# Patient Record
Sex: FEMALE | Race: BLACK OR AFRICAN AMERICAN | Employment: FULL TIME | URBAN - METROPOLITAN AREA
[De-identification: names, ages, dates, MRNs, and addresses within clinical notes are randomized per-mention and may not be internally consistent; named-entity substitution may affect disease eponyms.]

---

## 2017-01-10 ENCOUNTER — OFFICE VISIT (OUTPATIENT)
Dept: FAMILY MEDICINE CLINIC | Age: 52
End: 2017-01-10

## 2017-01-10 VITALS
OXYGEN SATURATION: 98 % | HEART RATE: 88 BPM | BODY MASS INDEX: 36.19 KG/M2 | SYSTOLIC BLOOD PRESSURE: 144 MMHG | DIASTOLIC BLOOD PRESSURE: 75 MMHG | TEMPERATURE: 97.8 F | WEIGHT: 212 LBS | HEIGHT: 64 IN | RESPIRATION RATE: 18 BRPM

## 2017-01-10 DIAGNOSIS — B37.31 VAGINAL CANDIDIASIS: Primary | ICD-10-CM

## 2017-01-10 NOTE — PROGRESS NOTES
Bhakti Win is a 46 y.o.  female and presents with recurrent vaginal white discharge with pruritus. She already has the Diflucan with refills and needed to refill. She is sure of her symptoms and knows the difference between   BV and yeast. She denies possibility of STD since she is not sexually active. Chief Complaint   Patient presents with    Vaginal Itching     Subjective: Additional Concerns: none    Patient Active Problem List    Diagnosis Date Noted    Weight gain 09/09/2016    HTN (hypertension) 06/01/2016     Current Outpatient Prescriptions   Medication Sig Dispense Refill    testosterone (ANDROGEL) 1 % (50 mg/5 gram) glpk 1 Packet by TransDERmal route daily. Max Daily Amount: 50 mg. 2% topical. Apply 0.5mg to wrist daily 30 Packet 0    atenolol (TENORMIN) 100 mg tablet Take 1 Tab by mouth daily. 90 Tab 1    ibuprofen (MOTRIN) 800 mg tablet Take 1 Tab by mouth every eight (8) hours as needed for Pain. 90 Tab 2    HYDROcodone-acetaminophen (NORCO) 5-325 mg per tablet Take 1-2 Tabs by mouth every six (6) hours as needed for Pain. Indications: PAIN 30 Tab 0    rosuvastatin (CRESTOR) 20 mg tablet Take 1 Tab by mouth nightly. Indications: HYPERLIPIDEMIA 30 Tab 2    estradiol (ESTRACE) 0.5 mg tablet Take 1 Tab by mouth daily.  90 Tab 1     No Known Allergies  Past Medical History   Diagnosis Date    Gynecological disorder      fibroids and low test.     Hypertension     Neck pain      Past Surgical History   Procedure Laterality Date    Hx gyn      Hx hysterectomy       fibroids     Hx hysterectomy       removal of ovaries     Family History   Problem Relation Age of Onset    Hypertension Mother     High Cholesterol Mother     No Known Problems Father      Social History   Substance Use Topics    Smoking status: Never Smoker    Smokeless tobacco: Never Used    Alcohol use Yes      Comment: socailly      ROS     General: negative for - chills, fatigue, fever, weight change  GI: negative for - abdominal pain, change in bowel habits, constipation, diarrhea or nausea/vomiting  : negative for - dysuria, hematuria, incontinence, pelvic pain or vulvar/vaginal symptoms except vaginal discharge white and pruritic     Objective:  Vitals:    01/10/17 1718   BP: 144/75   Pulse: 88   Resp: 18   Temp: 97.8 °F (36.6 °C)   TempSrc: Oral   SpO2: 98%   Weight: 212 lb (96.2 kg)   Height: 5' 4.02\" (1.626 m)   PainSc:   0 - No pain     PE    Alert, well appearing, and in no distress, oriented to person, place, and time and overweight  Mental status - alert, oriented to person, place, and time, normal mood, behavior, speech, dress, motor activity, and thought processes  Chest - clear to auscultation, no wheezes, rales or rhonchi, symmetric air entry  Heart - normal rate, regular rhythm, normal S1, S2, no murmurs, rubs, clicks or gallops  Abdomen - soft, nontender, nondistended, no masses or organomegaly    LABS   No visits with results within 6 Month(s) from this visit. Latest known visit with results is:    Orders Only on 06/01/2016   Component Date Value Ref Range Status    Cholesterol, total 06/25/2016 227* 100 - 199 mg/dL Final    Triglyceride 06/25/2016 115  0 - 149 mg/dL Final    HDL Cholesterol 06/25/2016 53  >39 mg/dL Final    Comment: According to ATP-III Guidelines, HDL-C >59 mg/dL is considered a  negative risk factor for CHD.       VLDL, calculated 06/25/2016 23  5 - 40 mg/dL Final    LDL, calculated 06/25/2016 151* 0 - 99 mg/dL Final    WBC 06/25/2016 4.2  3.4 - 10.8 x10E3/uL Final    RBC 06/25/2016 4.30  3.77 - 5.28 x10E6/uL Final    HGB 06/25/2016 12.3  11.1 - 15.9 g/dL Final    HCT 06/25/2016 38.3  34.0 - 46.6 % Final    MCV 06/25/2016 89  79 - 97 fL Final    MCH 06/25/2016 28.6  26.6 - 33.0 pg Final    MCHC 06/25/2016 32.1  31.5 - 35.7 g/dL Final    RDW 06/25/2016 13.4  12.3 - 15.4 % Final    PLATELET 60/18/5467 501  150 - 379 x10E3/uL Final    NEUTROPHILS 06/25/2016 49  % Final    Lymphocytes 06/25/2016 42  % Final    MONOCYTES 06/25/2016 6  % Final    EOSINOPHILS 06/25/2016 3  % Final    BASOPHILS 06/25/2016 0  % Final    ABS. NEUTROPHILS 06/25/2016 2.1  1.4 - 7.0 x10E3/uL Final    Abs Lymphocytes 06/25/2016 1.8  0.7 - 3.1 x10E3/uL Final    ABS. MONOCYTES 06/25/2016 0.3  0.1 - 0.9 x10E3/uL Final    ABS. EOSINOPHILS 06/25/2016 0.1  0.0 - 0.4 x10E3/uL Final    ABS. BASOPHILS 06/25/2016 0.0  0.0 - 0.2 x10E3/uL Final    IMMATURE GRANULOCYTES 06/25/2016 0  % Final    ABS. IMM. GRANS. 06/25/2016 0.0  0.0 - 0.1 x10E3/uL Final    Glucose 06/25/2016 109* 65 - 99 mg/dL Final    BUN 06/25/2016 10  6 - 24 mg/dL Final    Creatinine 06/25/2016 0.98  0.57 - 1.00 mg/dL Final    GFR est non-AA 06/25/2016 66  >59 mL/min/1.73 Final    GFR est AA 06/25/2016 76  >59 mL/min/1.73 Final    BUN/Creatinine ratio 06/25/2016 10  9 - 23 Final    Sodium 06/25/2016 141  134 - 144 mmol/L Final    Potassium 06/25/2016 4.1  3.5 - 5.2 mmol/L Final    Chloride 06/25/2016 100  97 - 108 mmol/L Final    CO2 06/25/2016 23  18 - 29 mmol/L Final    Calcium 06/25/2016 9.6  8.7 - 10.2 mg/dL Final    Protein, total 06/25/2016 6.9  6.0 - 8.5 g/dL Final    Albumin 06/25/2016 4.3  3.5 - 5.5 g/dL Final    GLOBULIN, TOTAL 06/25/2016 2.6  1.5 - 4.5 g/dL Final    A-G Ratio 06/25/2016 1.7  1.1 - 2.5 Final    Bilirubin, total 06/25/2016 0.4  0.0 - 1.2 mg/dL Final    Alk. phosphatase 06/25/2016 48  39 - 117 IU/L Final    AST 06/25/2016 18  0 - 40 IU/L Final    ALT 06/25/2016 16  0 - 32 IU/L Final    TSH 06/25/2016 1.040  0.450 - 4.500 uIU/mL Final    Hemoglobin A1c 06/25/2016 5.5  4.8 - 5.6 % Final    Comment:          Pre-diabetes: 5.7 - 6.4           Diabetes: >6.4           Glycemic control for adults with diabetes: <7.0      Estimated average glucose 06/25/2016 111  mg/dL Final    INTERPRETATION 06/25/2016 Note   Final    Supplement report is available.        TESTS  Results for orders placed or performed in visit on 06/01/16   LIPID PANEL   Result Value Ref Range    Cholesterol, total 227 (H) 100 - 199 mg/dL    Triglyceride 115 0 - 149 mg/dL    HDL Cholesterol 53 >39 mg/dL    VLDL, calculated 23 5 - 40 mg/dL    LDL, calculated 151 (H) 0 - 99 mg/dL   CBC WITH AUTOMATED DIFF   Result Value Ref Range    WBC 4.2 3.4 - 10.8 x10E3/uL    RBC 4.30 3.77 - 5.28 x10E6/uL    HGB 12.3 11.1 - 15.9 g/dL    HCT 38.3 34.0 - 46.6 %    MCV 89 79 - 97 fL    MCH 28.6 26.6 - 33.0 pg    MCHC 32.1 31.5 - 35.7 g/dL    RDW 13.4 12.3 - 15.4 %    PLATELET 607 854 - 012 x10E3/uL    NEUTROPHILS 49 %    Lymphocytes 42 %    MONOCYTES 6 %    EOSINOPHILS 3 %    BASOPHILS 0 %    ABS. NEUTROPHILS 2.1 1.4 - 7.0 x10E3/uL    Abs Lymphocytes 1.8 0.7 - 3.1 x10E3/uL    ABS. MONOCYTES 0.3 0.1 - 0.9 x10E3/uL    ABS. EOSINOPHILS 0.1 0.0 - 0.4 x10E3/uL    ABS. BASOPHILS 0.0 0.0 - 0.2 x10E3/uL    IMMATURE GRANULOCYTES 0 %    ABS. IMM. GRANS. 0.0 0.0 - 0.1 O56Q6/CT   METABOLIC PANEL, COMPREHENSIVE   Result Value Ref Range    Glucose 109 (H) 65 - 99 mg/dL    BUN 10 6 - 24 mg/dL    Creatinine 0.98 0.57 - 1.00 mg/dL    GFR est non-AA 66 >59 mL/min/1.73    GFR est AA 76 >59 mL/min/1.73    BUN/Creatinine ratio 10 9 - 23    Sodium 141 134 - 144 mmol/L    Potassium 4.1 3.5 - 5.2 mmol/L    Chloride 100 97 - 108 mmol/L    CO2 23 18 - 29 mmol/L    Calcium 9.6 8.7 - 10.2 mg/dL    Protein, total 6.9 6.0 - 8.5 g/dL    Albumin 4.3 3.5 - 5.5 g/dL    GLOBULIN, TOTAL 2.6 1.5 - 4.5 g/dL    A-G Ratio 1.7 1.1 - 2.5    Bilirubin, total 0.4 0.0 - 1.2 mg/dL    Alk.  phosphatase 48 39 - 117 IU/L    AST 18 0 - 40 IU/L    ALT 16 0 - 32 IU/L   TSH 3RD GENERATION   Result Value Ref Range    TSH 1.040 0.450 - 4.500 uIU/mL   HEMOGLOBIN A1C WITH EAG   Result Value Ref Range    Hemoglobin A1c 5.5 4.8 - 5.6 %    Estimated average glucose 111 mg/dL   CVD REPORT   Result Value Ref Range    INTERPRETATION Note      Assessment/Plan:     Vaginal candidiasis by symptoms - We decided for her to retry her remaining Diflucan from last treatment and see if that takes care of this problem and to do certain   Precautions in preventing vaginal yeast infections. Lab review: orders written for new lab studies as appropriate; see orders. I have discussed the diagnosis with the patient and the intended plan as seen in the above orders. The patient has received an after-visit summary and questions were answered concerning future plans. I have discussed medication side effects and warnings with the patient as well. I have reviewed the plan of care with the patient, accepted their input and they are in agreement with the treatment goals. F/U as needed.      Ariel Gotti MD
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
## 2017-01-11 PROBLEM — N89.8 VAGINAL DISCHARGE: Status: ACTIVE | Noted: 2017-01-11

## 2017-04-19 DIAGNOSIS — I10 ESSENTIAL HYPERTENSION: ICD-10-CM

## 2017-04-19 RX ORDER — ESTRADIOL 0.5 MG/1
0.5 TABLET ORAL DAILY
Qty: 90 TAB | Refills: 2 | Status: SHIPPED | OUTPATIENT
Start: 2017-04-19 | End: 2017-08-04 | Stop reason: SDUPTHER

## 2017-04-19 RX ORDER — ATENOLOL 100 MG/1
100 TABLET ORAL DAILY
Qty: 90 TAB | Refills: 2 | Status: SHIPPED | OUTPATIENT
Start: 2017-04-19 | End: 2017-08-04 | Stop reason: SDUPTHER

## 2017-04-19 NOTE — TELEPHONE ENCOUNTER
Pt calling to request medication refill of:  Requested Prescriptions     Pending Prescriptions Disp Refills    atenolol (TENORMIN) 100 mg tablet 90 Tab 2     Sig: Take 1 Tab by mouth daily.  estradiol (ESTRACE) 0.5 mg tablet 90 Tab 2     Sig: Take 1 Tab by mouth daily. be sent to [address] [place] [address] Rd  Pt has about 5-52 tabs remaining. Pts last appt was 1/10/17   Advised pt of 72 hour time frame for refill requests. Please advise.

## 2017-05-16 ENCOUNTER — HOSPITAL ENCOUNTER (OUTPATIENT)
Dept: MAMMOGRAPHY | Age: 52
Discharge: HOME | End: 2017-05-16
Attending: FAMILY MEDICINE
Payer: COMMERCIAL

## 2017-05-16 DIAGNOSIS — Z12.31 VISIT FOR SCREENING MAMMOGRAM: ICD-10-CM

## 2017-05-16 PROCEDURE — 77067 SCR MAMMO BI INCL CAD: CPT

## 2017-05-17 ENCOUNTER — TELEPHONE (OUTPATIENT)
Dept: FAMILY MEDICINE CLINIC | Age: 52
End: 2017-05-17

## 2017-08-04 ENCOUNTER — OFFICE VISIT (OUTPATIENT)
Dept: FAMILY MEDICINE CLINIC | Age: 52
End: 2017-08-04

## 2017-08-04 VITALS
HEIGHT: 64 IN | HEART RATE: 70 BPM | OXYGEN SATURATION: 98 % | TEMPERATURE: 98.5 F | BODY MASS INDEX: 35.68 KG/M2 | SYSTOLIC BLOOD PRESSURE: 135 MMHG | DIASTOLIC BLOOD PRESSURE: 70 MMHG | RESPIRATION RATE: 16 BRPM | WEIGHT: 209 LBS

## 2017-08-04 DIAGNOSIS — I10 ESSENTIAL HYPERTENSION: ICD-10-CM

## 2017-08-04 RX ORDER — ATENOLOL 100 MG/1
100 TABLET ORAL DAILY
Qty: 90 TAB | Refills: 2 | Status: SHIPPED | OUTPATIENT
Start: 2017-08-04 | End: 2017-11-08 | Stop reason: SDUPTHER

## 2017-08-04 RX ORDER — ESTRADIOL 0.5 MG/1
0.5 TABLET ORAL DAILY
Qty: 90 TAB | Refills: 2 | Status: SHIPPED | OUTPATIENT
Start: 2017-08-04 | End: 2018-07-10 | Stop reason: SDUPTHER

## 2017-08-04 NOTE — PROGRESS NOTES
Purvi Carty is a 45 y.o.  female and presents with preop for cervical fusion and decompression C6-7. Refill for HTN meds as well. Chief Complaint   Patient presents with    Pre-op Exam     Subjective: Additional Concerns: none     Patient Active Problem List    Diagnosis Date Noted    Vaginal discharge 01/11/2017    Weight gain 09/09/2016    HTN (hypertension) 06/01/2016     Current Outpatient Prescriptions   Medication Sig Dispense Refill    atenolol (TENORMIN) 100 mg tablet Take 1 Tab by mouth daily. 90 Tab 2    estradiol (ESTRACE) 0.5 mg tablet Take 1 Tab by mouth daily. 90 Tab 2    ibuprofen (MOTRIN) 800 mg tablet Take 1 Tab by mouth every eight (8) hours as needed for Pain. 90 Tab 2    testosterone (ANDROGEL) 1 % (50 mg/5 gram) glpk 1 Packet by TransDERmal route daily. Max Daily Amount: 50 mg. 2% topical. Apply 0.5mg to wrist daily 30 Packet 0    HYDROcodone-acetaminophen (NORCO) 5-325 mg per tablet Take 1-2 Tabs by mouth every six (6) hours as needed for Pain. Indications: PAIN 30 Tab 0    rosuvastatin (CRESTOR) 20 mg tablet Take 1 Tab by mouth nightly.  Indications: HYPERLIPIDEMIA 30 Tab 2     Allergies   Allergen Reactions    Crestor [Rosuvastatin] Nausea Only     Past Medical History:   Diagnosis Date    Gynecological disorder     fibroids and low test.     Hypertension     Neck pain      Past Surgical History:   Procedure Laterality Date    HX GYN      HX HYSTERECTOMY      fibroids     HX HYSTERECTOMY      removal of ovaries     Family History   Problem Relation Age of Onset    Hypertension Mother     High Cholesterol Mother     No Known Problems Father      Social History   Substance Use Topics    Smoking status: Never Smoker    Smokeless tobacco: Never Used    Alcohol use Yes      Comment: socailly      ROS     General: negative for - chills, fatigue, fever, weight change  Psych: negative for - anxiety, depression, irritability or mood swings  Resp: negative for - cough, shortness of breath or wheezing  CV: negative for - chest pain, edema or palpitations  GI: negative for - abdominal pain, change in bowel habits, constipation, diarrhea or nausea/vomiting  : negative for - dysuria, hematuria, incontinence, pelvic pain or vulvar/vaginal symptoms  MSK: positive for - neck joint pain, joint swelling or muscle pain  Neuro: negative for - confusion, headaches, seizures or weakness    Objective:  Vitals:    08/04/17 1131   BP: 135/70   Pulse: 70   Resp: 16   Temp: 98.5 °F (36.9 °C)   TempSrc: Oral   SpO2: 98%   Weight: 209 lb (94.8 kg)   Height: 5' 4\" (1.626 m)   PainSc:   0 - No pain     PE    Alert, well appearing, and in no distress, oriented to person, place, and time and overweight  Mental status - alert, oriented to person, place, and time, normal mood, behavior, speech, dress, motor activity, and thought processes  Eyes - pupils equal and reactive, extraocular eye movements intact  Ears - bilateral TM's and external ear canals normal  Mouth - mucous membranes moist, pharynx normal without lesions  Neck - supple, no significant adenopathy  Chest - clear to auscultation, no wheezes, rales or rhonchi, symmetric air entry  Heart - normal rate, regular rhythm, normal S1, S2, no murmurs, rubs, clicks or gallops  Abdomen - soft, nontender, nondistended, no masses or organomegaly  Neurological - alert, oriented, normal speech, no focal findings or movement disorder noted  Musculoskeletal - no joint tenderness, deformity or swelling  Extremities - peripheral pulses normal, no pedal edema, no clubbing or cyanosis    LABS   No visits with results within 6 Month(s) from this visit.   Latest known visit with results is:    Orders Only on 06/01/2016   Component Date Value Ref Range Status    Cholesterol, total 06/25/2016 227* 100 - 199 mg/dL Final    Triglyceride 06/25/2016 115  0 - 149 mg/dL Final    HDL Cholesterol 06/25/2016 53  >39 mg/dL Final    Comment: According to ATP-III Guidelines, HDL-C >59 mg/dL is considered a  negative risk factor for CHD.  VLDL, calculated 06/25/2016 23  5 - 40 mg/dL Final    LDL, calculated 06/25/2016 151* 0 - 99 mg/dL Final    WBC 06/25/2016 4.2  3.4 - 10.8 x10E3/uL Final    RBC 06/25/2016 4.30  3.77 - 5.28 x10E6/uL Final    HGB 06/25/2016 12.3  11.1 - 15.9 g/dL Final    HCT 06/25/2016 38.3  34.0 - 46.6 % Final    MCV 06/25/2016 89  79 - 97 fL Final    MCH 06/25/2016 28.6  26.6 - 33.0 pg Final    MCHC 06/25/2016 32.1  31.5 - 35.7 g/dL Final    RDW 06/25/2016 13.4  12.3 - 15.4 % Final    PLATELET 53/00/3908 975  150 - 379 x10E3/uL Final    NEUTROPHILS 06/25/2016 49  % Final    Lymphocytes 06/25/2016 42  % Final    MONOCYTES 06/25/2016 6  % Final    EOSINOPHILS 06/25/2016 3  % Final    BASOPHILS 06/25/2016 0  % Final    ABS. NEUTROPHILS 06/25/2016 2.1  1.4 - 7.0 x10E3/uL Final    Abs Lymphocytes 06/25/2016 1.8  0.7 - 3.1 x10E3/uL Final    ABS. MONOCYTES 06/25/2016 0.3  0.1 - 0.9 x10E3/uL Final    ABS. EOSINOPHILS 06/25/2016 0.1  0.0 - 0.4 x10E3/uL Final    ABS. BASOPHILS 06/25/2016 0.0  0.0 - 0.2 x10E3/uL Final    IMMATURE GRANULOCYTES 06/25/2016 0  % Final    ABS. IMM.  GRANS. 06/25/2016 0.0  0.0 - 0.1 x10E3/uL Final    Glucose 06/25/2016 109* 65 - 99 mg/dL Final    BUN 06/25/2016 10  6 - 24 mg/dL Final    Creatinine 06/25/2016 0.98  0.57 - 1.00 mg/dL Final    GFR est non-AA 06/25/2016 66  >59 mL/min/1.73 Final    GFR est AA 06/25/2016 76  >59 mL/min/1.73 Final    BUN/Creatinine ratio 06/25/2016 10  9 - 23 Final    Sodium 06/25/2016 141  134 - 144 mmol/L Final    Potassium 06/25/2016 4.1  3.5 - 5.2 mmol/L Final    Chloride 06/25/2016 100  97 - 108 mmol/L Final    CO2 06/25/2016 23  18 - 29 mmol/L Final    Calcium 06/25/2016 9.6  8.7 - 10.2 mg/dL Final    Protein, total 06/25/2016 6.9  6.0 - 8.5 g/dL Final    Albumin 06/25/2016 4.3  3.5 - 5.5 g/dL Final    GLOBULIN, TOTAL 06/25/2016 2.6  1.5 - 4.5 g/dL Final  A-G Ratio 06/25/2016 1.7  1.1 - 2.5 Final    Bilirubin, total 06/25/2016 0.4  0.0 - 1.2 mg/dL Final    Alk. phosphatase 06/25/2016 48  39 - 117 IU/L Final    AST (SGOT) 06/25/2016 18  0 - 40 IU/L Final    ALT (SGPT) 06/25/2016 16  0 - 32 IU/L Final    TSH 06/25/2016 1.040  0.450 - 4.500 uIU/mL Final    Hemoglobin A1c 06/25/2016 5.5  4.8 - 5.6 % Final    Comment:          Pre-diabetes: 5.7 - 6.4           Diabetes: >6.4           Glycemic control for adults with diabetes: <7.0      Estimated average glucose 06/25/2016 111  mg/dL Final    INTERPRETATION 06/25/2016 Note   Final    Supplement report is available. TESTS  Results for orders placed or performed in visit on 06/01/16   LIPID PANEL   Result Value Ref Range    Cholesterol, total 227 (H) 100 - 199 mg/dL    Triglyceride 115 0 - 149 mg/dL    HDL Cholesterol 53 >39 mg/dL    VLDL, calculated 23 5 - 40 mg/dL    LDL, calculated 151 (H) 0 - 99 mg/dL   CBC WITH AUTOMATED DIFF   Result Value Ref Range    WBC 4.2 3.4 - 10.8 x10E3/uL    RBC 4.30 3.77 - 5.28 x10E6/uL    HGB 12.3 11.1 - 15.9 g/dL    HCT 38.3 34.0 - 46.6 %    MCV 89 79 - 97 fL    MCH 28.6 26.6 - 33.0 pg    MCHC 32.1 31.5 - 35.7 g/dL    RDW 13.4 12.3 - 15.4 %    PLATELET 584 814 - 076 x10E3/uL    NEUTROPHILS 49 %    Lymphocytes 42 %    MONOCYTES 6 %    EOSINOPHILS 3 %    BASOPHILS 0 %    ABS. NEUTROPHILS 2.1 1.4 - 7.0 x10E3/uL    Abs Lymphocytes 1.8 0.7 - 3.1 x10E3/uL    ABS. MONOCYTES 0.3 0.1 - 0.9 x10E3/uL    ABS. EOSINOPHILS 0.1 0.0 - 0.4 x10E3/uL    ABS. BASOPHILS 0.0 0.0 - 0.2 x10E3/uL    IMMATURE GRANULOCYTES 0 %    ABS. IMM.  GRANS. 0.0 0.0 - 0.1 [id number]   METABOLIC PANEL, COMPREHENSIVE   Result Value Ref Range    Glucose 109 (H) 65 - 99 mg/dL    BUN 10 6 - 24 mg/dL    Creatinine 0.98 0.57 - 1.00 mg/dL    GFR est non-AA 66 >59 mL/min/1.73    GFR est AA 76 >59 mL/min/1.73    BUN/Creatinine ratio 10 9 - 23    Sodium 141 134 - 144 mmol/L    Potassium 4.1 3.5 - 5.2 mmol/L    Chloride 100 97 - 108 mmol/L    CO2 23 18 - 29 mmol/L    Calcium 9.6 8.7 - 10.2 mg/dL    Protein, total 6.9 6.0 - 8.5 g/dL    Albumin 4.3 3.5 - 5.5 g/dL    GLOBULIN, TOTAL 2.6 1.5 - 4.5 g/dL    A-G Ratio 1.7 1.1 - 2.5    Bilirubin, total 0.4 0.0 - 1.2 mg/dL    Alk. phosphatase 48 39 - 117 IU/L    AST (SGOT) 18 0 - 40 IU/L    ALT (SGPT) 16 0 - 32 IU/L   TSH 3RD GENERATION   Result Value Ref Range    TSH 1.040 0.450 - 4.500 uIU/mL   HEMOGLOBIN A1C WITH EAG   Result Value Ref Range    Hemoglobin A1c 5.5 4.8 - 5.6 %    Estimated average glucose 111 mg/dL   CVD REPORT   Result Value Ref Range    INTERPRETATION Note      Assessment/Plan:      1. Essential hypertension  - atenolol (TENORMIN) 100 mg tablet; Take 1 Tab by mouth daily. Dispense: 90 Tab; Refill: 2    2. Pre-op for cervical fusion and decompression C6-7. Patient is cleared for this procedure. 3. HRT - refilled Estrace     Lab review: orders written for new lab studies as appropriate; see orders. I have discussed the diagnosis with the patient and the intended plan as seen in the above orders. The patient has received an after-visit summary and questions were answered concerning future plans. I have discussed medication side effects and warnings with the patient as well. I have reviewed the plan of care with the patient, accepted their input and they are in agreement with the treatment goals.      F/U as needed    Deisy Degroot MD

## 2017-08-04 NOTE — PROGRESS NOTES
Дмитрий Tanner is a 45 y.o. female presents to office for pre-op exam.       1. Have you been to the ER, urgent care clinic or hospitalized since your last visit? no  2. Have you seen any other providers outside of [facility] since your last visit? yes  3. Have you had a Flu shot this year?  yes      Health Maintenance items with a due date reviewed with patient:  Health Maintenance Due   Topic Date Due    Hepatitis C Screening  1965    PAP AKA CERVICAL CYTOLOGY  05/14/1984    FOBT Q 1 YEAR AGE 50-75  05/14/2013    INFLUENZA AGE 9 TO ADULT  08/01/2017

## 2017-08-04 NOTE — PATIENT INSTRUCTIONS
DASH Diet: Care Instructions  Your Care Instructions  The DASH diet is an eating plan that can help lower your blood pressure. DASH stands for Dietary Approaches to Stop Hypertension. Hypertension is high blood pressure. The DASH diet focuses on eating foods that are high in calcium, potassium, and magnesium. These nutrients can lower blood pressure. The foods that are highest in these nutrients are fruits, vegetables, low-fat dairy products, nuts, seeds, and legumes. But taking calcium, potassium, and magnesium supplements instead of eating foods that are high in those nutrients does not have the same effect. The DASH diet also includes whole grains, fish, and poultry. The DASH diet is one of several lifestyle changes your doctor may recommend to lower your high blood pressure. Your doctor may also want you to decrease the amount of sodium in your diet. Lowering sodium while following the DASH diet can lower blood pressure even further than just the DASH diet alone. Follow-up care is a key part of your treatment and safety. Be sure to make and go to all appointments, and call your doctor if you are having problems. It's also a good idea to know your test results and keep a list of the medicines you take. How can you care for yourself at home? Following the DASH diet  · Eat 4 to 5 servings of fruit each day. A serving is 1 medium-sized piece of fruit, ½ cup chopped or canned fruit, 1/4 cup dried fruit, or 4 ounces (½ cup) of fruit juice. Choose fruit more often than fruit juice. · Eat 4 to 5 servings of vegetables each day. A serving is 1 cup of lettuce or raw leafy vegetables, ½ cup of chopped or cooked vegetables, or 4 ounces (½ cup) of vegetable juice. Choose vegetables more often than vegetable juice. · Get 2 to 3 servings of low-fat and fat-free dairy each day. A serving is 8 ounces of milk, 1 cup of yogurt, or 1 ½ ounces of cheese. · Eat 6 to 8 servings of grains each day.  A serving is 1 slice of bread, 1 ounce of dry cereal, or ½ cup of cooked rice, pasta, or cooked cereal. Try to choose whole-grain products as much as possible. · Limit lean meat, poultry, and fish to 2 servings each day. A serving is 3 ounces, about the size of a deck of cards. · Eat 4 to 5 servings of nuts, seeds, and legumes (cooked dried beans, lentils, and split peas) each week. A serving is 1/3 cup of nuts, 2 tablespoons of seeds, or ½ cup of cooked beans or peas. · Limit fats and oils to 2 to 3 servings each day. A serving is 1 teaspoon of vegetable oil or 2 tablespoons of salad dressing. · Limit sweets and added sugars to 5 servings or less a week. A serving is 1 tablespoon jelly or jam, ½ cup sorbet, or 1 cup of lemonade. · Eat less than 2,300 milligrams (mg) of sodium a day. If you limit your sodium to 1,500 mg a day, you can lower your blood pressure even more. Tips for success  · Start small. Do not try to make dramatic changes to your diet all at once. You might feel that you are missing out on your favorite foods and then be more likely to not follow the plan. Make small changes, and stick with them. Once those changes become habit, add a few more changes. · Try some of the following:  ¨ Make it a goal to eat a fruit or vegetable at every meal and at snacks. This will make it easy to get the recommended amount of fruits and vegetables each day. ¨ Try yogurt topped with fruit and nuts for a snack or healthy dessert. ¨ Add lettuce, tomato, cucumber, and onion to sandwiches. ¨ Combine a ready-made pizza crust with low-fat mozzarella cheese and lots of vegetable toppings. Try using tomatoes, squash, spinach, broccoli, carrots, cauliflower, and onions. ¨ Have a variety of cut-up vegetables with a low-fat dip as an appetizer instead of chips and dip. ¨ Sprinkle sunflower seeds or chopped almonds over salads. Or try adding chopped walnuts or almonds to cooked vegetables. ¨ Try some vegetarian meals using beans and peas. Add garbanzo or kidney beans to salads. Make burritos and tacos with mashed quintana beans or black beans. Where can you learn more? Go to http://charity-antoni.info/. Enter K072 in the search box to learn more about \"DASH Diet: Care Instructions. \"  Current as of: April 3, 2017  Content Version: 11.3  © 1050-5015 [employer]. Care instructions adapted under license by [employer] (which disclaims liability or warranty for this information). If you have questions about a medical condition or this instruction, always ask your healthcare professional. [place] [address] any warranty or liability for your use of this information. Low Sodium Diet (2,000 Milligram): Care Instructions  Your Care Instructions  Too much sodium causes your body to hold on to extra water. This can raise your blood pressure and force your heart and kidneys to work harder. In very serious cases, this could cause you to be put in the hospital. It might even be life-threatening. By limiting sodium, you will feel better and lower your risk of serious problems. The most common source of sodium is salt. People get most of the salt in their diet from canned, prepared, and packaged foods. Fast food and restaurant meals also are very high in sodium. Your doctor will probably limit your sodium to less than 2,000 milligrams (mg) a day. This limit counts all the sodium in prepared and packaged foods and any salt you add to your food. Follow-up care is a key part of your treatment and safety. Be sure to make and go to all appointments, and call your doctor if you are having problems. It's also a good idea to know your test results and keep a list of the medicines you take. How can you care for yourself at home? Read food labels  · Read labels on cans and food packages. The labels tell you how much sodium is in each serving. Make sure that you look at the serving size.  If you eat more than the serving size, you have eaten more sodium. · Food labels also tell you the Percent Daily Value for sodium. Choose products with low Percent Daily Values for sodium. · Be aware that sodium can come in forms other than salt, including monosodium glutamate (MSG), sodium citrate, and sodium bicarbonate (baking soda). MSG is often added to Asian food. When you eat out, you can sometimes ask for food without MSG or added salt. Buy low-sodium foods  · Buy foods that are labeled \"unsalted\" (no salt added), \"sodium-free\" (less than 5 mg of sodium per serving), or \"low-sodium\" (less than 140 mg of sodium per serving). Foods labeled \"reduced-sodium\" and \"light sodium\" may still have too much sodium. Be sure to read the label to see how much sodium you are getting. · Buy fresh vegetables, or frozen vegetables without added sauces. Buy low-sodium versions of canned vegetables, soups, and other canned goods. Prepare low-sodium meals  · Cut back on the amount of salt you use in cooking. This will help you adjust to the taste. Do not add salt after cooking. One teaspoon of salt has about 2,300 mg of sodium. · Take the salt shaker off the table. · Flavor your food with garlic, lemon juice, onion, vinegar, herbs, and spices. Do not use soy sauce, lite soy sauce, steak sauce, onion salt, garlic salt, celery salt, mustard, or ketchup on your food. · Use low-sodium salad dressings, sauces, and ketchup. Or make your own salad dressings and sauces without adding salt. · Use less salt (or none) when recipes call for it. You can often use half the salt a recipe calls for without losing flavor. Other foods such as rice, pasta, and grains do not need added salt. · Rinse canned vegetables, and cook them in fresh water. This removes some--but not all--of the salt. · Avoid water that is naturally high in sodium or that has been treated with water softeners, which add sodium.  Call your local water company to find out the sodium content of your water supply. If you buy bottled water, read the label and choose a sodium-free brand. Avoid high-sodium foods  · Avoid eating:  ¨ Smoked, cured, salted, and canned meat, fish, and poultry. ¨ Ham, trinh, hot dogs, and luncheon meats. ¨ Regular, hard, and processed cheese and regular peanut butter. ¨ Crackers with salted tops, and other salted snack foods such as pretzels, chips, and salted popcorn. ¨ Frozen prepared meals, unless labeled low-sodium. ¨ Canned and dried soups, broths, and bouillon, unless labeled sodium-free or low-sodium. ¨ Canned vegetables, unless labeled sodium-free or low-sodium. ¨ [place] fries, pizza, tacos, and other fast foods. ¨ Pickles, olives, ketchup, and other condiments, especially soy sauce, unless labeled sodium-free or low-sodium. Where can you learn more? Go to http://charity-antoni.info/. Enter M553 in the search box to learn more about \"Low Sodium Diet (2,000 Milligram): Care Instructions. \"  Current as of: July 26, 2016  Content Version: 11.3  © 1560-4823 [employer]. Care instructions adapted under license by [employer] (which disclaims liability or warranty for this information). If you have questions about a medical condition or this instruction, always ask your healthcare professional. [place] [address] any warranty or liability for your use of this information. High Blood Pressure: Care Instructions  Your Care Instructions  If your blood pressure is usually above 140/90, you have high blood pressure, or hypertension. That means the top number is 140 or higher or the bottom number is 90 or higher, or both. Despite what a lot of people think, high blood pressure usually doesn't cause headaches or make you feel dizzy or lightheaded. It usually has no symptoms. But it does increase your risk for heart attack, stroke, and kidney or eye damage.  The higher your blood pressure, the more your risk increases. Your doctor will give you a goal for your blood pressure. Your goal will be based on your health and your age. An example of a goal is to keep your blood pressure below 140/90. Lifestyle changes, such as eating healthy and being active, are always important to help lower blood pressure. You might also take medicine to reach your blood pressure goal.  Follow-up care is a key part of your treatment and safety. Be sure to make and go to all appointments, and call your doctor if you are having problems. It's also a good idea to know your test results and keep a list of the medicines you take. How can you care for yourself at home? Medical treatment  · If you stop taking your medicine, your blood pressure will go back up. You may take one or more types of medicine to lower your blood pressure. Be safe with medicines. Take your medicine exactly as prescribed. Call your doctor if you think you are having a problem with your medicine. · Talk to your doctor before you start taking aspirin every day. Aspirin can help certain people lower their risk of a heart attack or stroke. But taking aspirin isn't right for everyone, because it can cause serious bleeding. · See your doctor regularly. You may need to see the doctor more often at first or until your blood pressure comes down. · If you are taking blood pressure medicine, talk to your doctor before you take decongestants or anti-inflammatory medicine, such as ibuprofen. Some of these medicines can raise blood pressure. · Learn how to check your blood pressure at home. Lifestyle changes  · Stay at a healthy weight. This is especially important if you put on weight around the waist. Losing even 10 pounds can help you lower your blood pressure. · If your doctor recommends it, get more exercise. Walking is a good choice. Bit by bit, increase the amount you walk every day. Try for at least 30 minutes on most days of the week.  You also may want to swim, bike, or do other activities. · Avoid or limit alcohol. Talk to your doctor about whether you can drink any alcohol. · Try to limit how much sodium you eat to less than 2,300 milligrams (mg) a day. Your doctor may ask you to try to eat less than 1,500 mg a day. · Eat plenty of fruits (such as bananas and oranges), vegetables, legumes, whole grains, and low-fat dairy products. · Lower the amount of saturated fat in your diet. Saturated fat is found in animal products such as milk, cheese, and meat. Limiting these foods may help you lose weight and also lower your risk for heart disease. · Do not smoke. Smoking increases your risk for heart attack and stroke. If you need help quitting, talk to your doctor about stop-smoking programs and medicines. These can increase your chances of quitting for good. When should you call for help? Call 911 anytime you think you may need emergency care. This may mean having symptoms that suggest that your blood pressure is causing a serious heart or blood vessel problem. Your blood pressure may be over 180/110. For example, call 911 if:  · You have symptoms of a heart attack. These may include:  ¨ Chest pain or pressure, or a strange feeling in the chest.  ¨ Sweating. ¨ Shortness of breath. ¨ Nausea or vomiting. ¨ Pain, pressure, or a strange feeling in the back, neck, jaw, or upper belly or in one or both shoulders or arms. ¨ Lightheadedness or sudden weakness. ¨ A fast or irregular heartbeat. · You have symptoms of a stroke. These may include:  ¨ Sudden numbness, tingling, weakness, or loss of movement in your face, arm, or leg, especially on only one side of your body. ¨ Sudden vision changes. ¨ Sudden trouble speaking. ¨ Sudden confusion or trouble understanding simple statements. ¨ Sudden problems with walking or balance. ¨ A sudden, severe headache that is different from past headaches. · You have severe back or belly pain.   Do not wait until your blood pressure comes down on its own. Get help right away. Call your doctor now or seek immediate care if:  · Your blood pressure is much higher than normal (such as 180/110 or higher), but you don't have symptoms. · You think high blood pressure is causing symptoms, such as:  ¨ Severe headache. ¨ Blurry vision. Watch closely for changes in your health, and be sure to contact your doctor if:  · Your blood pressure measures 140/90 or higher at least 2 times. That means the top number is 140 or higher or the bottom number is 90 or higher, or both. · You think you may be having side effects from your blood pressure medicine. · Your blood pressure is usually normal, but it goes above normal at least 2 times. Where can you learn more? Go to http://charity-antoni.info/. Enter X183 in the search box to learn more about \"High Blood Pressure: Care Instructions. \"  Current as of: August 8, 2016  Content Version: 11.3  © 0860-5480 [employer]. Care instructions adapted under license by [employer] (which disclaims liability or warranty for this information). If you have questions about a medical condition or this instruction, always ask your healthcare professional. [place] [address] any warranty or liability for your use of this information. Hormone Therapy (HT): Care Instructions  Your Care Instructions  Hormone therapy (HT) is medicine to treat symptoms of menopause, such as hot flashes, vaginal dryness, and sleep problems. It replaces the hormones that drop at menopause. Most women get relief from these symptoms within weeks of starting HT. HT contains two female hormones, estrogen and progestin. HT may come in the form of a pill, patch, gel, spray, or vaginal ring. A vaginal cream or a vaginal ring that has a much lower dose of estrogen may be used to relieve vaginal dryness only. HT has some risks.  Most doctors recommend that women only take HT for as short a time as possible. This is to reduce the chances of heart disease, breast cancer, blood clots, and stroke that may be connected to HT. Be sure to have regular checkups with your doctor when taking HT. Talk with your doctor about whether HT is right for you. If you decide that the benefits of HT outweigh the risks, ask your doctor to prescribe the lowest effective dose for as short a time as possible. Follow-up care is a key part of your treatment and safety. Be sure to make and go to all appointments, and call your doctor if you are having problems. It's also a good idea to know your test results and keep a list of the medicines you take. Why might you take HT?  · HT reduces symptoms of menopause. These include hot flashes, mood swings, and sleep problems. · The estrogen in HT helps to prevent thinning bones. And it may lower the chance of colon cancer. · HT helps keep the lining of the vagina moist and thick. This can reduce irritation. · HT helps protect against dental problems, such as tooth loss and gum disease. What are the risks of taking HT? · Some women who take HT may have vaginal bleeding, bloating, nausea, sore breasts, mood swings, and headaches. Talk to your doctor about changing the type of HT you take or lowering the dose. This may help to end these side effects. · Taking HT may slightly increase your risk for heart disease, breast cancer, ovarian cancer, blood clots, and stroke. · You should not take HT if you:  ¨ Could be pregnant. ¨ Have a personal history of breast cancer, endometrial cancer, pulmonary embolism, deep vein thrombosis, heart attack, or stroke. ¨ Have vaginal bleeding from an unknown cause. ¨ Have active liver disease. What can you do to reduce the symptoms of menopause? · Eat healthy foods and get regular exercise. This also will help to maintain strong bones and a healthy heart. · Do not smoke.  If you smoke, you can reduce hot flashes and long-term health risks by stopping. If you need help quitting, talk to your doctor about stop-smoking programs and medicines. These can increase your chances of quitting for good. · Practice daily breathing exercises (meditation) to reduce hot flashes and mood swings. · Limit the amount of alcohol you drink. This can reduce symptoms of menopause and long-term health risks. · Keep your home and office cool. · Use a vaginal lubricant, such as Astroglide, Wet Gel Lubricant, or K-Y Jelly. · Do pelvic floor (Kegel) exercises, which tighten and strengthen pelvic muscles. To do Kegel exercises:  ¨ Squeeze the same muscles you would use to stop your urine. Your belly and thighs should not move. ¨ Hold the squeeze for 3 seconds, then relax for 3 seconds. ¨ Start with 3 seconds. Then add 1 second each week until you are able to squeeze for 10 seconds. ¨ Repeat the exercise 10 to 15 times a session. Do three or more sessions a day. Where can you learn more? Go to http://charity-antoni.info/. Enter [id number] in the search box to learn more about \"Hormone Therapy (HT): Care Instructions. \"  Current as of: October 13, 2016  Content Version: 11.3  © 2478-2779 [employer]. Care instructions adapted under license by [employer] (which disclaims liability or warranty for this information). If you have questions about a medical condition or this instruction, always ask your healthcare professional. [place] [address] any warranty or liability for your use of this information. How to Prepare for Surgery  How do you prepare for surgery? Surgery can be stressful. This information will help you understand what you can expect. And it will help you safely prepare for surgery. Follow-up care is a key part of your treatment and safety. Be sure to make and go to all appointments, and call your doctor if you are having problems.  It's also a good idea to know your test results and keep a list of the medicines you take. What happens before surgery? Preparing for surgery  · Understand exactly what surgery is planned, along with the risks, benefits, and other options. · Tell your doctors ALL the medicines, vitamins, supplements, and herbal remedies you take. Some of these can increase the risk of bleeding or interact with anesthesia. · If you take blood thinners, such as warfarin (Coumadin), clopidogrel (Plavix), or aspirin, be sure to talk to your doctor. He or she will tell you if you should stop taking these medicines before your surgery. Make sure that you understand exactly what your doctor wants you to do. · Your doctor will tell you which medicines to take or stop before your surgery. You may need to stop taking certain medicines a week or more before surgery. So talk to your doctor as soon as you can. · If you have an advance directive, let your doctor know. It may include a living will and a durable power of  for health care. Bring a copy to the hospital. If don't have one, you may want to prepare one. It lets your doctor and loved ones know your health care wishes. Doctors advise that everyone prepare these papers before any type of surgery or procedure. What happens on the day of surgery? · Follow the instructions about when to stop eating and drinking. If you don't, your surgery may be canceled. If your doctor told you to take your medicines on the day of surgery, take them with only a sip of water. · Take a bath or shower before coming in for your surgery. Do not apply lotions, perfumes, deodorants, or nail polish. · Do not shave the surgical site yourself. · Take off all jewelry and piercings. And take out contact lenses, if you wear them. At the hospital or surgery center  · Bring a picture ID. · The area for surgery is often marked to make sure there are no errors. · You will be kept comfortable and safe by your anesthesia provider. The anesthesia may make you sleep.  Or it may just numb the area being worked on. Going home  · Be sure you have someone to drive you home. Anesthesia and pain medicine make it unsafe for you to drive. · You will be given more specific instructions about recovering from your surgery. They will cover things like diet, wound care, follow-up care, driving, and getting back to your normal routine. When should you call your doctor? · You have questions or concerns. · You don't understand how to prepare for your surgery. · You become ill before the surgery (such as fever, flu, or a cold). · You need to reschedule or have changed your mind about having the surgery. Where can you learn more? Go to http://charity-antoni.info/. Enter Q270 in the search box to learn more about \"How to Prepare for Surgery. \"  Current as of: August 14, 2016  Content Version: 11.3  © 0594-4012 [employer]. Care instructions adapted under license by [employer] (which disclaims liability or warranty for this information). If you have questions about a medical condition or this instruction, always ask your healthcare professional. [address] any warranty or liability for your use of this information.

## 2017-08-04 NOTE — MR AVS SNAPSHOT
Visit Information Date & Time Provider Department Dept. Phone Encounter #  
 8/4/2017 11:30 AM June Daniel MD [facility] [phone] [id number] Upcoming Health Maintenance Date Due Hepatitis C Screening 1965 PAP AKA CERVICAL CYTOLOGY 5/14/1984 FOBT Q 1 YEAR AGE 50-75 5/14/2013 INFLUENZA AGE 9 TO ADULT 8/1/2017 BREAST CANCER SCRN MAMMOGRAM 5/16/2019 DTaP/Tdap/Td series (2 - Td) 7/10/2026 Allergies as of 8/4/2017  Review Complete On: 8/4/2017 By: Joan Kinney LPN Severity Noted Reaction Type Reactions Crestor [Rosuvastatin]  08/04/2017    Nausea Only Current Immunizations  Reviewed on 11/14/2016 Name Date Hep A Vaccine 11/9/2016, 9/11/2016 Hep B Vaccine 11/9/2016, 9/11/2016 Influenza Vaccine 9/11/2016 Pneumococcal Polysaccharide (PPSV-23) 9/11/2016 Tdap 7/10/2016 Not reviewed this visit You Were Diagnosed With   
  
 Codes Comments Essential hypertension     ICD-10-CM: I10 
ICD-9-CM: 401.9 Vitals BP Pulse Temp Resp Height(growth percentile) Weight(growth percentile) 135/70 (BP 1 Location: Right arm, BP Patient Position: Sitting) 70 98.5 °F (36.9 °C) (Oral) 16 5' 4\" (1.626 m) 209 lb (94.8 kg) SpO2 BMI OB Status Smoking Status 98% 35.87 kg/m2 Hysterectomy Never Smoker BMI and BSA Data Body Mass Index Body Surface Area  
 35.87 kg/m 2 2.07 m 2 Preferred Pharmacy Pharmacy Name Phone [facility] [address] [place] [address] [phone] Your Updated Medication List  
  
   
This list is accurate as of: 8/4/17 12:04 PM.  Always use your most recent med list.  
  
  
  
  
 atenolol 100 mg tablet Commonly known as:  TENORMIN Take 1 Tab by mouth daily. estradiol 0.5 mg tablet Commonly known as:  ESTRACE Take 1 Tab by mouth daily. HYDROcodone-acetaminophen 5-325 mg per tablet Commonly known as:  Salvador Collar Take 1-2 Tabs by mouth every six (6) hours as needed for Pain. Indications: PAIN  
  
 ibuprofen 800 mg tablet Commonly known as:  MOTRIN Take 1 Tab by mouth every eight (8) hours as needed for Pain. rosuvastatin 20 mg tablet Commonly known as:  CRESTOR Take 1 Tab by mouth nightly. Indications: HYPERLIPIDEMIA  
  
 testosterone 1 % (50 mg/5 gram) Glpk Commonly known as:  ANDROGEL  
1 Packet by TransDERmal route daily. Max Daily Amount: 50 mg. 2% topical. Apply 0.5mg to wrist daily Introducing [facility]! Merissa Herrera introduces HomeZada patient portal. Now you can access parts of your medical record, email your doctor's office, and request medication refills online. 1. In your internet browser, go to [email or web address] 2. Click on the First Time User? Click Here link in the Sign In box. You will see the New Member Sign Up page. 3. Enter your HomeZada Access Code exactly as it appears below. You will not need to use this code after youve completed the sign-up process. If you do not sign up before the expiration date, you must request a new code. · HomeZada Access Code: [email or web address] Expires: 11/2/2017 12:04 PM 
 
4. Enter the last four digits of your Social Security Number (xxxx) and Date of Birth (mm/dd/yyyy) as indicated and click Submit. You will be taken to the next sign-up page. 5. Create a Udacityt ID. This will be your HomeZada login ID and cannot be changed, so think of one that is secure and easy to remember. 6. Create a HomeZada password. You can change your password at any time. 7. Enter your Password Reset Question and Answer. This can be used at a later time if you forget your password. 8. Enter your e-mail address. You will receive e-mail notification when new information is available in [address]. 9. Click Sign Up. You can now view and download portions of your medical record. 10. Click the Download Summary menu link to download a portable copy of your medical information. If you have questions, please visit the Frequently Asked Questions section of the As Seen on TV website. Remember, As Seen on TV is NOT to be used for urgent needs. For medical emergencies, dial 911. Now available from your iPhone and Android! Please provide this summary of care documentation to your next provider. Your primary care clinician is listed as Frantz Noyola. If you have any questions after today's visit, please call [phone].

## 2017-10-27 ENCOUNTER — OFFICE VISIT (OUTPATIENT)
Dept: FAMILY MEDICINE CLINIC | Age: 52
End: 2017-10-27

## 2017-10-27 VITALS
TEMPERATURE: 97.3 F | DIASTOLIC BLOOD PRESSURE: 82 MMHG | SYSTOLIC BLOOD PRESSURE: 147 MMHG | HEIGHT: 64 IN | OXYGEN SATURATION: 97 % | HEART RATE: 72 BPM | RESPIRATION RATE: 17 BRPM | BODY MASS INDEX: 36.81 KG/M2 | WEIGHT: 215.6 LBS

## 2017-10-27 DIAGNOSIS — R63.5 WEIGHT GAIN: ICD-10-CM

## 2017-10-27 DIAGNOSIS — G62.9 NEUROPATHY: Primary | ICD-10-CM

## 2017-10-27 NOTE — PROGRESS NOTES
Rika Weller is a 45 y.o.  female and presents with weight gain and peripheral neuropathy. Chief Complaint   Patient presents with    Tingling     Subjective: Additional Concerns: none    Patient Active Problem List    Diagnosis Date Noted    Vaginal discharge 01/11/2017    Weight gain 09/09/2016    HTN (hypertension) 06/01/2016     Current Outpatient Prescriptions   Medication Sig Dispense Refill    atenolol (TENORMIN) 100 mg tablet Take 1 Tab by mouth daily. 90 Tab 2    estradiol (ESTRACE) 0.5 mg tablet Take 1 Tab by mouth daily. 90 Tab 2    testosterone (ANDROGEL) 1 % (50 mg/5 gram) glpk 1 Packet by TransDERmal route daily. Max Daily Amount: 50 mg. 2% topical. Apply 0.5mg to wrist daily 30 Packet 0    rosuvastatin (CRESTOR) 20 mg tablet Take 1 Tab by mouth nightly. Indications: HYPERLIPIDEMIA 30 Tab 2    ibuprofen (MOTRIN) 800 mg tablet Take 1 Tab by mouth every eight (8) hours as needed for Pain. 90 Tab 2    HYDROcodone-acetaminophen (NORCO) 5-325 mg per tablet Take 1-2 Tabs by mouth every six (6) hours as needed for Pain.  Indications: PAIN 30 Tab 0     Allergies   Allergen Reactions    Crestor [Rosuvastatin] Nausea Only     Past Medical History:   Diagnosis Date    Gynecological disorder     fibroids and low test.     Hypertension     Neck pain      Past Surgical History:   Procedure Laterality Date    HX GYN      HX HYSTERECTOMY      fibroids     HX HYSTERECTOMY      removal of ovaries     Family History   Problem Relation Age of Onset    Hypertension Mother     High Cholesterol Mother     No Known Problems Father      Social History   Substance Use Topics    Smoking status: Never Smoker    Smokeless tobacco: Never Used    Alcohol use Yes      Comment: socailly      ROS     General: negative for - chills, fatigue, fever, positive weight gain change  Psych: negative for - anxiety, depression, irritability or mood swings  Endo: negative for - hot flashes, polydipsia/polyuria or temperature intolerance  Resp: negative for - cough, shortness of breath or wheezing  CV: negative for - chest pain, edema or palpitations  Neuro: negative for - confusion, headaches, seizures or weakness, positive for numbness and tingling     Objective:  Vitals:    10/27/17 1324   BP: 147/82   Pulse: 72   Resp: 17   Temp: 97.3 °F (36.3 °C)   TempSrc: Oral   SpO2: 97%   Weight: 215 lb 9.6 oz (97.8 kg)   Height: 5' 4\" (1.626 m)   PainSc:   2     PE    alert, well appearing, and in no distress, oriented to person, place, and time and overweight  General appearance - alert, well appearing, and in no distress, oriented to person, place, and time and overweight  Mental status - alert, oriented to person, place, and time, normal mood, behavior, speech, dress, motor activity, and thought processes  Chest - clear to auscultation, no wheezes, rales or rhonchi, symmetric air entry  Heart - normal rate, regular rhythm, normal S1, S2, no murmurs, rubs, clicks or gallops  Extremities - peripheral pulses normal, no pedal edema, no clubbing or cyanosis    LABS   No visits with results within 6 Month(s) from this visit. Latest known visit with results is:    Orders Only on 06/01/2016   Component Date Value Ref Range Status    Cholesterol, total 06/25/2016 227* 100 - 199 mg/dL Final    Triglyceride 06/25/2016 115  0 - 149 mg/dL Final    HDL Cholesterol 06/25/2016 53  >39 mg/dL Final    Comment: According to ATP-III Guidelines, HDL-C >59 mg/dL is considered a  negative risk factor for CHD.       VLDL, calculated 06/25/2016 23  5 - 40 mg/dL Final    LDL, calculated 06/25/2016 151* 0 - 99 mg/dL Final    WBC 06/25/2016 4.2  3.4 - 10.8 x10E3/uL Final    RBC 06/25/2016 4.30  3.77 - 5.28 x10E6/uL Final    HGB 06/25/2016 12.3  11.1 - 15.9 g/dL Final    HCT 06/25/2016 38.3  34.0 - 46.6 % Final    MCV 06/25/2016 89  79 - 97 fL Final    MCH 06/25/2016 28.6  26.6 - 33.0 pg Final    MCHC 06/25/2016 32.1  31.5 - 35.7 g/dL Final    RDW 06/25/2016 13.4  12.3 - 15.4 % Final    PLATELET 97/70/3933 873  150 - 379 x10E3/uL Final    NEUTROPHILS 06/25/2016 49  % Final    Lymphocytes 06/25/2016 42  % Final    MONOCYTES 06/25/2016 6  % Final    EOSINOPHILS 06/25/2016 3  % Final    BASOPHILS 06/25/2016 0  % Final    ABS. NEUTROPHILS 06/25/2016 2.1  1.4 - 7.0 x10E3/uL Final    Abs Lymphocytes 06/25/2016 1.8  0.7 - 3.1 x10E3/uL Final    ABS. MONOCYTES 06/25/2016 0.3  0.1 - 0.9 x10E3/uL Final    ABS. EOSINOPHILS 06/25/2016 0.1  0.0 - 0.4 x10E3/uL Final    ABS. BASOPHILS 06/25/2016 0.0  0.0 - 0.2 x10E3/uL Final    IMMATURE GRANULOCYTES 06/25/2016 0  % Final    ABS. IMM. GRANS. 06/25/2016 0.0  0.0 - 0.1 x10E3/uL Final    Glucose 06/25/2016 109* 65 - 99 mg/dL Final    BUN 06/25/2016 10  6 - 24 mg/dL Final    Creatinine 06/25/2016 0.98  0.57 - 1.00 mg/dL Final    GFR est non-AA 06/25/2016 66  >59 mL/min/1.73 Final    GFR est AA 06/25/2016 76  >59 mL/min/1.73 Final    BUN/Creatinine ratio 06/25/2016 10  9 - 23 Final    Sodium 06/25/2016 141  134 - 144 mmol/L Final    Potassium 06/25/2016 4.1  3.5 - 5.2 mmol/L Final    Chloride 06/25/2016 100  97 - 108 mmol/L Final    CO2 06/25/2016 23  18 - 29 mmol/L Final    Calcium 06/25/2016 9.6  8.7 - 10.2 mg/dL Final    Protein, total 06/25/2016 6.9  6.0 - 8.5 g/dL Final    Albumin 06/25/2016 4.3  3.5 - 5.5 g/dL Final    GLOBULIN, TOTAL 06/25/2016 2.6  1.5 - 4.5 g/dL Final    A-G Ratio 06/25/2016 1.7  1.1 - 2.5 Final    Bilirubin, total 06/25/2016 0.4  0.0 - 1.2 mg/dL Final    Alk.  phosphatase 06/25/2016 48  39 - 117 IU/L Final    AST (SGOT) 06/25/2016 18  0 - 40 IU/L Final    ALT (SGPT) 06/25/2016 16  0 - 32 IU/L Final    TSH 06/25/2016 1.040  0.450 - 4.500 uIU/mL Final    Hemoglobin A1c 06/25/2016 5.5  4.8 - 5.6 % Final    Comment:          Pre-diabetes: 5.7 - 6.4           Diabetes: >6.4           Glycemic control for adults with diabetes: <7.0      Estimated average glucose 06/25/2016 111  mg/dL Final    INTERPRETATION 06/25/2016 Note   Final    Supplement report is available. TESTS  Results for orders placed or performed in visit on 06/01/16   LIPID PANEL   Result Value Ref Range    Cholesterol, total 227 (H) 100 - 199 mg/dL    Triglyceride 115 0 - 149 mg/dL    HDL Cholesterol 53 >39 mg/dL    VLDL, calculated 23 5 - 40 mg/dL    LDL, calculated 151 (H) 0 - 99 mg/dL   CBC WITH AUTOMATED DIFF   Result Value Ref Range    WBC 4.2 3.4 - 10.8 x10E3/uL    RBC 4.30 3.77 - 5.28 x10E6/uL    HGB 12.3 11.1 - 15.9 g/dL    HCT 38.3 34.0 - 46.6 %    MCV 89 79 - 97 fL    MCH 28.6 26.6 - 33.0 pg    MCHC 32.1 31.5 - 35.7 g/dL    RDW 13.4 12.3 - 15.4 %    PLATELET 946 805 - 921 x10E3/uL    NEUTROPHILS 49 %    Lymphocytes 42 %    MONOCYTES 6 %    EOSINOPHILS 3 %    BASOPHILS 0 %    ABS. NEUTROPHILS 2.1 1.4 - 7.0 x10E3/uL    Abs Lymphocytes 1.8 0.7 - 3.1 x10E3/uL    ABS. MONOCYTES 0.3 0.1 - 0.9 x10E3/uL    ABS. EOSINOPHILS 0.1 0.0 - 0.4 x10E3/uL    ABS. BASOPHILS 0.0 0.0 - 0.2 x10E3/uL    IMMATURE GRANULOCYTES 0 %    ABS. IMM. GRANS. 0.0 0.0 - 0.1 K36H1/IX   METABOLIC PANEL, COMPREHENSIVE   Result Value Ref Range    Glucose 109 (H) 65 - 99 mg/dL    BUN 10 6 - 24 mg/dL    Creatinine 0.98 0.57 - 1.00 mg/dL    GFR est non-AA 66 >59 mL/min/1.73    GFR est AA 76 >59 mL/min/1.73    BUN/Creatinine ratio 10 9 - 23    Sodium 141 134 - 144 mmol/L    Potassium 4.1 3.5 - 5.2 mmol/L    Chloride 100 97 - 108 mmol/L    CO2 23 18 - 29 mmol/L    Calcium 9.6 8.7 - 10.2 mg/dL    Protein, total 6.9 6.0 - 8.5 g/dL    Albumin 4.3 3.5 - 5.5 g/dL    GLOBULIN, TOTAL 2.6 1.5 - 4.5 g/dL    A-G Ratio 1.7 1.1 - 2.5    Bilirubin, total 0.4 0.0 - 1.2 mg/dL    Alk.  phosphatase 48 39 - 117 IU/L    AST (SGOT) 18 0 - 40 IU/L    ALT (SGPT) 16 0 - 32 IU/L   TSH 3RD GENERATION   Result Value Ref Range    TSH 1.040 0.450 - 4.500 uIU/mL   HEMOGLOBIN A1C WITH EAG   Result Value Ref Range    Hemoglobin A1c 5.5 4.8 - 5.6 % Estimated average glucose 111 mg/dL   CVD REPORT   Result Value Ref Range    INTERPRETATION Note      Assessment/Plan:      1. Neuropathy    - LIPID PANEL; Future  - METABOLIC PANEL, COMPREHENSIVE; Future  - TSH 3RD GENERATION; Future  - HEMOGLOBIN A1C WITH EAG; Future    2. Weight gain - Trial of Belviq. Patient given info about BS weight loss program x 2 and free online resource for weight loss. Lab review: orders written for new lab studies as appropriate; see orders. I have discussed the diagnosis with the patient and the intended plan as seen in the above orders. The patient has received an after-visit summary and questions were answered concerning future plans. I have discussed medication side effects and warnings with the patient as well. I have reviewed the plan of care with the patient, accepted their input and they are in agreement with the treatment goals. F/U as needed. 3 months routine.      Deronda Kehr, MD

## 2017-10-27 NOTE — PROGRESS NOTES
Axel Martines is a 45 y.o. female presents to office for tingling feelinging in foot      1.  Have you been to the ER, urgent care clinic or hospitalized since your last visit? no        Health Maintenance items with a due date reviewed with patient:  Health Maintenance Due   Topic Date Due    Hepatitis C Screening  1965    PAP AKA CERVICAL CYTOLOGY  05/14/1984    FOBT Q 1 YEAR AGE 50-75  05/14/2013    INFLUENZA AGE 9 TO ADULT  08/01/2017

## 2017-10-27 NOTE — MR AVS SNAPSHOT
Visit Information Date & Time Provider Department Dept. Phone Encounter #  
 10/27/2017  1:15 PM Taqueria Martinez MD [facility] [phone] [id number] Upcoming Health Maintenance Date Due Hepatitis C Screening 1965 PAP AKA CERVICAL CYTOLOGY 5/14/1984 FOBT Q 1 YEAR AGE 50-75 5/14/2013 INFLUENZA AGE 9 TO ADULT 8/1/2017 BREAST CANCER SCRN MAMMOGRAM 5/16/2019 DTaP/Tdap/Td series (2 - Td) 7/10/2026 Allergies as of 10/27/2017  Review Complete On: 8/4/2017 By: Amandeep Davis LPN Severity Noted Reaction Type Reactions Crestor [Rosuvastatin]  08/04/2017    Nausea Only Current Immunizations  Reviewed on 11/14/2016 Name Date Hep A Vaccine 8/21/2017, 11/9/2016, 9/11/2016 Hep B Vaccine 8/21/2017, 11/9/2016, 9/11/2016 Influenza Vaccine 9/11/2016 Pneumococcal Polysaccharide (PPSV-23) 9/11/2016 Tdap 7/10/2016 Not reviewed this visit You Were Diagnosed With   
  
 Codes Comments Neuropathy    -  Primary ICD-10-CM: G62.9 ICD-9-CM: 355.9 Weight gain     ICD-10-CM: R63.5 ICD-9-CM: 783.1 Vitals BP Pulse Temp Resp Height(growth percentile) Weight(growth percentile) 147/82 72 97.3 °F (36.3 °C) (Oral) 17 5' 4\" (1.626 m) 215 lb 9.6 oz (97.8 kg) SpO2 BMI OB Status Smoking Status 97% 37.01 kg/m2 Hysterectomy Never Smoker BMI and BSA Data Body Mass Index Body Surface Area  
 37.01 kg/m 2 2.1 m 2 Preferred Pharmacy Pharmacy Name Phone [facility] [address] [place] [address] [phone] Your Updated Medication List  
  
   
This list is accurate as of: 10/27/17  2:07 PM.  Always use your most recent med list.  
  
  
  
  
 atenolol 100 mg tablet Commonly known as:  TENORMIN Take 1 Tab by mouth daily. estradiol 0.5 mg tablet Commonly known as:  ESTRACE Take 1 Tab by mouth daily. HYDROcodone-acetaminophen 5-325 mg per tablet Commonly known as:  Nelma Brenda Take 1-2 Tabs by mouth every six (6) hours as needed for Pain. Indications: PAIN  
  
 ibuprofen 800 mg tablet Commonly known as:  MOTRIN Take 1 Tab by mouth every eight (8) hours as needed for Pain.  
  
 lorcaserin 10 mg Tab Commonly known as:  Vallerie Homes Take 10 mg by mouth two (2) times a day. Max Daily Amount: 20 mg. Indications: WEIGHT LOSS MANAGEMENT FOR OBESE PATIENT (BMI >= 30)  
  
 rosuvastatin 20 mg tablet Commonly known as:  CRESTOR Take 1 Tab by mouth nightly. Indications: HYPERLIPIDEMIA  
  
 testosterone 1 % (50 mg/5 gram) Glpk Commonly known as:  ANDROGEL  
1 Packet by TransDERmal route daily. Max Daily Amount: 50 mg. 2% topical. Apply 0.5mg to wrist daily Prescriptions Printed Refills  
 lorcaserin (BELVIQ) 10 mg tab 2 Sig: Take 10 mg by mouth two (2) times a day. Max Daily Amount: 20 mg. Indications: WEIGHT LOSS MANAGEMENT FOR OBESE PATIENT (BMI >= 30) Class: Print Route: Oral  
  
We Performed the Following HEMOGLOBIN A1C WITH EAG [24570 CPT(R)] LIPID PANEL [67397 CPT(R)] METABOLIC PANEL, COMPREHENSIVE [47868 CPT(R)] TSH 3RD GENERATION [36858 CPT(R)] To-Do List   
 10/27/2017 Lab:  HEMOGLOBIN A1C WITH EAG   
  
 10/27/2017 Lab:  LIPID PANEL   
  
 10/27/2017 Lab:  METABOLIC PANEL, COMPREHENSIVE   
  
 10/27/2017 Lab:  TSH 3RD GENERATION [facility]! Dear Rajiv Palmer: Thank you for requesting a [employer] account. Our records indicate that you already have an active [employer] account. You can access your account anytime at [email or web address] Did you know that you can access your hospital and ER discharge instructions at any time in [employer]? You can also review all of your test results from your hospital stay or ER visit. Additional Information If you have questions, please visit the Frequently Asked Questions section of the [employer] website at [email or web address] Remember, Jalbum is NOT to be used for urgent needs. For medical emergencies, dial 911. Now available from your iPhone and Android! Please provide this summary of care documentation to your next provider. Your primary care clinician is listed as Frantz Noyola. If you have any questions after today's visit, please call [phone].

## 2017-10-28 LAB
ALBUMIN SERPL-MCNC: 4.2 G/DL (ref 3.5–5.5)
ALBUMIN/GLOB SERPL: 1.6 {RATIO} (ref 1.2–2.2)
ALP SERPL-CCNC: 48 IU/L (ref 39–117)
ALT SERPL-CCNC: 20 IU/L (ref 0–32)
AST SERPL-CCNC: 19 IU/L (ref 0–40)
BILIRUB SERPL-MCNC: 0.4 MG/DL (ref 0–1.2)
BUN SERPL-MCNC: 9 MG/DL (ref 6–24)
BUN/CREAT SERPL: 12 (ref 9–23)
CALCIUM SERPL-MCNC: 9.1 MG/DL (ref 8.7–10.2)
CHLORIDE SERPL-SCNC: 102 MMOL/L (ref 96–106)
CHOLEST SERPL-MCNC: 234 MG/DL (ref 100–199)
CO2 SERPL-SCNC: 25 MMOL/L (ref 18–29)
CREAT SERPL-MCNC: 0.74 MG/DL (ref 0.57–1)
EST. AVERAGE GLUCOSE BLD GHB EST-MCNC: 128 MG/DL
GFR SERPLBLD CREATININE-BSD FMLA CKD-EPI: 106 ML/MIN/1.73
GFR SERPLBLD CREATININE-BSD FMLA CKD-EPI: 92 ML/MIN/1.73
GLOBULIN SER CALC-MCNC: 2.7 G/DL (ref 1.5–4.5)
GLUCOSE SERPL-MCNC: 95 MG/DL (ref 65–99)
HBA1C MFR BLD: 6.1 % (ref 4.8–5.6)
HDLC SERPL-MCNC: 56 MG/DL
INTERPRETATION, 910389: NORMAL
LDLC SERPL CALC-MCNC: 152 MG/DL (ref 0–99)
POTASSIUM SERPL-SCNC: 4 MMOL/L (ref 3.5–5.2)
PROT SERPL-MCNC: 6.9 G/DL (ref 6–8.5)
SODIUM SERPL-SCNC: 142 MMOL/L (ref 134–144)
TRIGL SERPL-MCNC: 130 MG/DL (ref 0–149)
TSH SERPL DL<=0.005 MIU/L-ACNC: 1.07 UIU/ML (ref 0.45–4.5)
VLDLC SERPL CALC-MCNC: 26 MG/DL (ref 5–40)

## 2017-10-28 NOTE — PATIENT INSTRUCTIONS
Neuropathic Pain: Care Instructions  Your Care Instructions    Neuropathic pain is caused by pressure on or damage to your nerves. It's often simply called nerve pain. Some people feel this type of pain all the time. For others, it comes and goes. Diabetes, shingles, or an injury can cause nerve pain. Many people say the pain feels sharp, burning, or stabbing. But some people feel it as a dull ache. In some cases, it makes your skin very sensitive. So touch, pressure, and other sensations that did not hurt before may now cause pain. It's important to know that this kind of pain is real and can affect your quality of life. It's also important to know that treatment can help. Treatment includes pain medicines, exercise, and physical therapy. Medicines can help reduce the number of pain signals that travel over the nerves. This can make the painful areas less sensitive. It can also help you sleep better and improve your mood. But medicines are only one part of successful treatment. Most people do best with more than one kind of treatment. Your doctor may recommend that you try cognitive-behavioral therapy and stress management. Or, if needed, you may decide to try to quit smoking, lower your blood pressure, or better control blood sugar. These kinds of healthy changes can also make a difference. If you feel that your treatment is not working, talk to your doctor. And be sure to tell your doctor if you think you might be depressed or anxious. These are common problems that can also be treated. Follow-up care is a key part of your treatment and safety. Be sure to make and go to all appointments, and call your doctor if you are having problems. It's also a good idea to know your test results and keep a list of the medicines you take. How can you care for yourself at home? · Be safe with medicines. Read and follow all instructions on the label.   ¨ If the doctor gave you a prescription medicine for pain, take it as prescribed. ¨ If you are not taking a prescription pain medicine, ask your doctor if you can take an over-the-counter medicine. · Save hard tasks for days when you have less pain. Follow a hard task with an easy task. And remember to take breaks. · Relax, and reduce stress. You may want to try deep breathing or meditation. These can help. · Keep moving. Gentle, daily exercise can help reduce pain. Your doctor or physical therapist can tell you what type of exercise is best for you. This may include walking, swimming, and stationary biking. It may also include stretches and range-of-motion exercises. · Try heat, cold packs, and massage. · Get enough sleep. Constant pain can make you more tired. If the pain makes it hard to sleep, talk with your doctor. · Think positively. Your thoughts can affect your pain. Do fun things to distract yourself from the pain. See a movie, read a book, listen to music, or spend time with a friend. · Keep a pain diary. Try to write down how strong your pain is and what it feels like. Also try to notice and write down how your moods, thoughts, sleep, activities, and medicine affect your pain. These notes can help you and your doctor find the best ways to treat your pain. Reducing constipation caused by pain medicine  Pain medicines often cause constipation. To reduce constipation:  · Include fruits, vegetables, beans, and whole grains in your diet each day. These foods are high in fiber. · Drink plenty of fluids, enough so that your urine is light yellow or clear like water. If you have kidney, heart, or liver disease and have to limit fluids, talk with your doctor before you increase the amount of fluids you drink. · Get some exercise every day. Build up slowly to 30 to 60 minutes a day on 5 or more days of the week. · Take a fiber supplement, such as Citrucel or Metamucil, every day if needed. Read and follow all instructions on the label.   · Schedule time each day for a bowel movement. Having a daily routine may help. Take your time and do not strain when having a bowel movement. · Ask your doctor about a laxative. The goal is to have one easy bowel movement every 1 to 2 days. Do not let constipation go untreated for more than 3 days. When should you call for help? Call your doctor now or seek immediate medical care if:  ? · You feel sad, anxious, or hopeless for more than a few days. This could mean you are depressed. Depression is common in people who have a lot of pain. But it can be treated. ? · You have trouble with bowel movements, such as:  ¨ No bowel movement in 3 days. ¨ Blood in the anal area, in your stool, or on the toilet paper. ¨ Diarrhea for more than 24 hours. ? Watch closely for changes in your health, and be sure to contact your doctor if:  ? · Your pain is getting worse. ? · You can't sleep because of pain. ? · You are very worried or anxious about your pain. ? · You have trouble taking your pain medicine. ? · You have any concerns about your pain medicine or its side effects. ? · You have vomiting or cramps for more than 2 hours. Where can you learn more? Go to http://charity-antoni.info/. Enter L242 in the search box to learn more about \"Neuropathic Pain: Care Instructions. \"  Current as of: October 14, 2016  Content Version: 11.4  © 6215-2981 [employer]. Care instructions adapted under license by [employer] (which disclaims liability or warranty for this information). If you have questions about a medical condition or this instruction, always ask your healthcare professional. [address] any warranty or liability for your use of this information. Abnormal Weight Gain: Care Instructions  Your Care Instructions    There are two types of weight gain-normal and abnormal. Normal weight gain is usually caused by eating too much or exercising too little.  It can also happen as you get older. But abnormal weight gain has other causes. It can be caused by a problem with your thyroid gland, called hypothyroidism. Or it can be caused by a problem with your adrenal glands, called Cushing's syndrome. Or your body could be holding too much fluid because of kidney, liver, or heart problems. In some cases, a medicine you take can cause you to gain weight. You can work with your doctor to find out the cause of your weight gain. You will probably need tests to do this. Follow-up care is a key part of your treatment and safety. Be sure to make and go to all appointments, and call your doctor if you are having problems. It's also a good idea to know your test results and keep a list of the medicines you take. How can you care for yourself at home? · Weigh yourself at the same time every day. It's best to do it first thing in the morning after you empty your bladder. Be sure to always wear the same amount of clothing. · Write down any changes in your weight and the possible causes. Discuss these with your doctor. · Your doctor may want you to change your diet and exercise habits. A good way to lose weight is to reduce calories and increase exercise. · Walking is an easy way to get exercise. Try to walk a little longer every day. You also may want to swim, bike, or do other activities. · Ask your doctor if you should see a dietitian. This is a person who can help you plan meals that work best for your lifestyle. · If your doctor prescribed medicines, take them exactly as prescribed. Call your doctor if you think you are having a problem with your medicine. You will get more details on the specific medicines your doctor prescribes. When should you call for help? Watch closely for changes in your health, and be sure to contact your doctor if:  ? · You do not get better as expected. ? · You continue to gain weight. Where can you learn more?   Go to http://charity-antoni.info/. Enter A175 in the search box to learn more about \"Abnormal Weight Gain: Care Instructions. \"  Current as of: October 13, 2016  Content Version: 11.4  © 4427-1353 [employer]. Care instructions adapted under license by [employer] (which disclaims liability or warranty for this information). If you have questions about a medical condition or this instruction, always ask your healthcare professional. [place] [address] any warranty or liability for your use of this information.

## 2017-10-28 NOTE — PROGRESS NOTES
Pls inform patient her A1C bad chol and HTN needed to be reduced. Weight loss will be a big   Way of taking care of this. If we check her again in 3 months with some significnat weight loss   Even over 5-10 lbs we should see an improvement with diet, exercise and low chol diet. If not we may need to start med for [facility] high chol next visit. Plan to increase BP med to BID from daily  Due to elevated BP. Pls pend new dose x 6 months. F/U in 3 months fasting for recheck labs.

## 2017-10-30 ENCOUNTER — TELEPHONE (OUTPATIENT)
Dept: FAMILY MEDICINE CLINIC | Age: 52
End: 2017-10-30

## 2017-10-30 NOTE — TELEPHONE ENCOUNTER
Patient states that she cant afford the medication you wrote her at previous visit. She would like to switch it to Phentermine.

## 2017-10-30 NOTE — TELEPHONE ENCOUNTER
Pt calling b/c the rx for lorcaserin (BELVIQ) 10 mg tab   Is not cov by pts ins & cost over $300. She would like a rx for Phentermine.  Please call pt once rx is ready for p/u

## 2017-10-31 RX ORDER — PHENTERMINE HYDROCHLORIDE 37.5 MG/1
37.5 TABLET ORAL
Qty: 30 TAB | Refills: 0 | Status: SHIPPED | OUTPATIENT
Start: 2017-12-30 | End: 2018-01-29

## 2017-10-31 RX ORDER — PHENTERMINE HYDROCHLORIDE 37.5 MG/1
37.5 TABLET ORAL
Qty: 30 TAB | Refills: 0 | Status: SHIPPED | OUTPATIENT
Start: 2017-10-31 | End: 2017-11-30

## 2017-10-31 RX ORDER — PHENTERMINE HYDROCHLORIDE 37.5 MG/1
37.5 TABLET ORAL
Qty: 30 TAB | Refills: 0 | Status: SHIPPED | OUTPATIENT
Start: 2017-11-30 | End: 2017-12-30

## 2017-11-07 ENCOUNTER — TELEPHONE (OUTPATIENT)
Dept: FAMILY MEDICINE CLINIC | Age: 52
End: 2017-11-07

## 2017-11-07 DIAGNOSIS — I10 ESSENTIAL HYPERTENSION: ICD-10-CM

## 2017-11-07 NOTE — TELEPHONE ENCOUNTER
----- Message from Carlos Galindo MD sent at 10/28/2017  6:05 PM EDT -----  Pls inform patient her A1C bad chol and HTN needed to be reduced. Weight loss will be a big   Way of taking care of this. If we check her again in 3 months with some significnat weight loss   Even over 5-10 lbs we should see an improvement with diet, exercise and low chol diet. If not we may need to start med for [facility] high chol next visit. Plan to increase BP med to BID from daily  Due to elevated BP. Pls pend new dose x 6 months. F/U in 3 months fasting for recheck labs.

## 2017-11-08 RX ORDER — ATENOLOL 100 MG/1
100 TABLET ORAL 2 TIMES DAILY
Qty: 180 TAB | Refills: 2 | Status: SHIPPED | OUTPATIENT
Start: 2017-11-08 | End: 2018-12-02 | Stop reason: SDUPTHER

## 2017-11-08 NOTE — TELEPHONE ENCOUNTER
Spoke with patient re results. Advised to eat healthy and drink plenty of hunger to relieve hunger. Advised of eating almonds and nuts which are low cholesterol and high in protein to satisfy hunger and cravings. Medication pended please sign.

## 2017-11-08 NOTE — TELEPHONE ENCOUNTER
----- Message from Ana Lozada MD sent at 10/28/2017  6:05 PM EDT -----  Pls inform patient her A1C bad chol and HTN needed to be reduced. Weight loss will be a big   Way of taking care of this. If we check her again in 3 months with some significnat weight loss   Even over 5-10 lbs we should see an improvement with diet, exercise and low chol diet. If not we may need to start med for [facility] high chol next visit. Plan to increase BP med to BID from daily  Due to elevated BP. Pls pend new dose x 6 months. F/U in 3 months fasting for recheck labs.

## 2018-02-15 ENCOUNTER — OFFICE VISIT (OUTPATIENT)
Dept: FAMILY MEDICINE CLINIC | Age: 53
End: 2018-02-15

## 2018-02-15 VITALS
HEIGHT: 64 IN | WEIGHT: 216.8 LBS | RESPIRATION RATE: 17 BRPM | BODY MASS INDEX: 37.01 KG/M2 | SYSTOLIC BLOOD PRESSURE: 156 MMHG | DIASTOLIC BLOOD PRESSURE: 86 MMHG | TEMPERATURE: 97.7 F | HEART RATE: 69 BPM | OXYGEN SATURATION: 98 %

## 2018-02-15 DIAGNOSIS — J06.9 ACUTE URI: Primary | ICD-10-CM

## 2018-02-15 RX ORDER — ALBUTEROL SULFATE 90 UG/1
1-2 AEROSOL, METERED RESPIRATORY (INHALATION)
COMMUNITY
Start: 2018-02-11 | End: 2018-12-14

## 2018-02-15 RX ORDER — PREDNISONE 20 MG/1
60 TABLET ORAL
COMMUNITY
Start: 2018-02-11 | End: 2018-02-15

## 2018-02-15 RX ORDER — HYDROCODONE POLISTIREX AND CHLORPHENIRAMINE POLISTIREX 10; 8 MG/5ML; MG/5ML
5 SUSPENSION, EXTENDED RELEASE ORAL
COMMUNITY
Start: 2018-02-11 | End: 2018-12-14

## 2018-02-15 RX ORDER — FLUTICASONE PROPIONATE AND SALMETEROL 250; 50 UG/1; UG/1
1 POWDER RESPIRATORY (INHALATION) EVERY 12 HOURS
Qty: 1 INHALER | Refills: 1 | Status: SHIPPED | OUTPATIENT
Start: 2018-02-15 | End: 2018-12-14

## 2018-02-15 NOTE — PROGRESS NOTES
Cyndy Jimenes is a 45 y.o.  female and presents with a t least a week of fatigue with fever in the beginning   With muscle aches and pain. Patient has some URI symptoms that are getting better. Chief Complaint   Patient presents with    Cough    Wheezing     Subjective: Additional Concerns: none    Patient Active Problem List    Diagnosis Date Noted    Vaginal discharge 01/11/2017    Weight gain 09/09/2016    HTN (hypertension) 06/01/2016     Current Outpatient Prescriptions   Medication Sig Dispense Refill    chlorpheniramine-HYDROcodone (TUSSIONEX) 10-8 mg/5 mL suspension 5 mL.  albuterol (PROVENTIL HFA, VENTOLIN HFA, PROAIR HFA) 90 mcg/actuation inhaler 1-2 Puffs.  fluticasone-salmeterol (ADVAIR DISKUS) 250-50 mcg/dose diskus inhaler Take 1 Puff by inhalation every twelve (12) hours. 1 Inhaler 1    atenolol (TENORMIN) 100 mg tablet Take 1 Tab by mouth two (2) times a day. 180 Tab 2    estradiol (ESTRACE) 0.5 mg tablet Take 1 Tab by mouth daily. 90 Tab 2    ibuprofen (MOTRIN) 800 mg tablet Take 1 Tab by mouth every eight (8) hours as needed for Pain. 90 Tab 2    rosuvastatin (CRESTOR) 20 mg tablet Take 1 Tab by mouth nightly. Indications: HYPERLIPIDEMIA 30 Tab 2    predniSONE (DELTASONE) 20 mg tablet 60 mg.      lorcaserin (BELVIQ) 10 mg tab Take 10 mg by mouth two (2) times a day. Max Daily Amount: 20 mg. Indications: WEIGHT LOSS MANAGEMENT FOR OBESE PATIENT (BMI >= 30) 60 Tab 2    testosterone (ANDROGEL) 1 % (50 mg/5 gram) glpk 1 Packet by TransDERmal route daily. Max Daily Amount: 50 mg. 2% topical. Apply 0.5mg to wrist daily 30 Packet 0    HYDROcodone-acetaminophen (NORCO) 5-325 mg per tablet Take 1-2 Tabs by mouth every six (6) hours as needed for Pain.  Indications: PAIN 30 Tab 0     Allergies   Allergen Reactions    Crestor [Rosuvastatin] Nausea Only     Past Medical History:   Diagnosis Date    Gynecological disorder     fibroids and low test.    [facility] Hypertension     Neck pain      Past Surgical History:   Procedure Laterality Date    HX GYN      HX HYSTERECTOMY      fibroids     HX HYSTERECTOMY      removal of ovaries     Family History   Problem Relation Age of Onset    Hypertension Mother     High Cholesterol Mother     No Known Problems Father      Social History   Substance Use Topics    Smoking status: Never Smoker    Smokeless tobacco: Never Used    Alcohol use Yes      Comment: socailly      ROS     General: negative for - chills, positive for fatigue,no fever, weight change  Psych: negative for - anxiety, depression, irritability or mood swings  Endo: negative for - hot flashes, polydipsia/polyuria or temperature intolerance  Resp: positive for - cough, shortness of breath or wheezing  CV: negative for - chest pain, edema or palpitations  MSK: positive for - joint pain, joint swelling or muscle pain  Neuro: negative for - confusion, headaches, seizures or weakness    Objective:  Vitals:    02/15/18 1425   BP: 156/86   Pulse: 69   Resp: 17   Temp: 97.7 °F (36.5 °C)   TempSrc: Oral   SpO2: 98%   Weight: 216 lb 12.8 oz (98.3 kg)   Height: 5' 4\" (1.626 m)   PainSc:   8     PE    Alert, well appearing, and in no distress, oriented to person, place, and time, normal appearing weight and overweight  General appearance - alert, well appearing, and in no distress, oriented to person, place, and time and overweight  Mental status - alert, oriented to person, place, and time, normal mood, behavior, speech, dress, motor activity, and thought processes  Chest - clear to auscultation, no wheezes, rales or rhonchi, symmetric air entry, no tachypnea, retractions or cyanosis  Heart - normal rate, regular rhythm, normal S1, S2, no murmurs, rubs, clicks or gallops  Extremities - peripheral pulses normal, no pedal edema, no clubbing or cyanosis    LABS   Office Visit on 10/27/2017   Component Date Value Ref Range Status    Cholesterol, total 10/27/2017 234* 100 - 199 mg/dL Final    Triglyceride 10/27/2017 130  0 - 149 mg/dL Final    HDL Cholesterol 10/27/2017 56  >39 mg/dL Final    VLDL, calculated 10/27/2017 26  5 - 40 mg/dL Final    LDL, calculated 10/27/2017 152* 0 - 99 mg/dL Final    Glucose 10/27/2017 95  65 - 99 mg/dL Final    BUN 10/27/2017 9  6 - 24 mg/dL Final    Creatinine 10/27/2017 0.74  0.57 - 1.00 mg/dL Final    GFR est non-AA 10/27/2017 92  >59 mL/min/1.73 Final    GFR est AA 10/27/2017 106  >59 mL/min/1.73 Final    BUN/Creatinine ratio 10/27/2017 12  9 - 23 Final    Sodium 10/27/2017 142  134 - 144 mmol/L Final    Potassium 10/27/2017 4.0  3.5 - 5.2 mmol/L Final    Chloride 10/27/2017 102  96 - 106 mmol/L Final    CO2 10/27/2017 25  18 - 29 mmol/L Final    Calcium 10/27/2017 9.1  8.7 - 10.2 mg/dL Final    Protein, total 10/27/2017 6.9  6.0 - 8.5 g/dL Final    Albumin 10/27/2017 4.2  3.5 - 5.5 g/dL Final    GLOBULIN, TOTAL 10/27/2017 2.7  1.5 - 4.5 g/dL Final    A-G Ratio 10/27/2017 1.6  1.2 - 2.2 Final    Bilirubin, total 10/27/2017 0.4  0.0 - 1.2 mg/dL Final    Alk. phosphatase 10/27/2017 48  39 - 117 IU/L Final    AST (SGOT) 10/27/2017 19  0 - 40 IU/L Final    ALT (SGPT) 10/27/2017 20  0 - 32 IU/L Final    TSH 10/27/2017 1.070  0.450 - 4.500 uIU/mL Final    Hemoglobin A1c 10/27/2017 6.1* 4.8 - 5.6 % Final    Comment:          Pre-diabetes: 5.7 - 6.4           Diabetes: >6.4           Glycemic control for adults with diabetes: <7.0      Estimated average glucose 10/27/2017 128  mg/dL Final    INTERPRETATION 10/27/2017 Note   Final    Supplemental report is available.        TESTS  Results for orders placed or performed in visit on 10/27/17   LIPID PANEL   Result Value Ref Range    Cholesterol, total 234 (H) 100 - 199 mg/dL    Triglyceride 130 0 - 149 mg/dL    HDL Cholesterol 56 >39 mg/dL    VLDL, calculated 26 5 - 40 mg/dL    LDL, calculated 152 (H) 0 - 99 mg/dL   METABOLIC PANEL, COMPREHENSIVE   Result Value Ref Range    Glucose 95 65 - 99 mg/dL    BUN 9 6 - 24 mg/dL    Creatinine 0.74 0.57 - 1.00 mg/dL    GFR est non-AA 92 >59 mL/min/1.73    GFR est  >59 mL/min/1.73    BUN/Creatinine ratio 12 9 - 23    Sodium 142 134 - 144 mmol/L    Potassium 4.0 3.5 - 5.2 mmol/L    Chloride 102 96 - 106 mmol/L    CO2 25 18 - 29 mmol/L    Calcium 9.1 8.7 - 10.2 mg/dL    Protein, total 6.9 6.0 - 8.5 g/dL    Albumin 4.2 3.5 - 5.5 g/dL    GLOBULIN, TOTAL 2.7 1.5 - 4.5 g/dL    A-G Ratio 1.6 1.2 - 2.2    Bilirubin, total 0.4 0.0 - 1.2 mg/dL    Alk. phosphatase 48 39 - 117 IU/L    AST (SGOT) 19 0 - 40 IU/L    ALT (SGPT) 20 0 - 32 IU/L   TSH 3RD GENERATION   Result Value Ref Range    TSH 1.070 0.450 - 4.500 uIU/mL   HEMOGLOBIN A1C WITH EAG   Result Value Ref Range    Hemoglobin A1c 6.1 (H) 4.8 - 5.6 %    Estimated average glucose 128 mg/dL   CVD REPORT   Result Value Ref Range    INTERPRETATION Note      Assessment/Plan:      Acute URI with wheezing and SOB  - chlorpheniramine-HYDROcodone (TUSSIONEX) 10-8 mg/5 mL suspension; 5 mL. - albuterol (PROVENTIL HFA, VENTOLIN HFA, PROAIR HFA) 90 mcg/actuation inhaler; 1-2 Puffs. - predniSONE (DELTASONE) 20 mg tablet; 60 mg.  - fluticasone-salmeterol (ADVAIR DISKUS) 250-50 mcg/dose diskus inhaler; Take 1 Puff by inhalation every twelve (12) hours. Dispense: 1 Inhaler; Refill: 1 for short term use. Lab review: orders written for new lab studies as appropriate; see orders. I have discussed the diagnosis with the patient and the intended plan as seen in the above orders. The patient has received an after-visit summary and questions were answered concerning future plans. I have discussed medication side effects and warnings with the patient as well. I have reviewed the plan of care with the patient, accepted their input and they are in agreement with the treatment goals. F/U as needed.      Bita Miller MD

## 2018-02-15 NOTE — PATIENT INSTRUCTIONS

## 2018-02-15 NOTE — PROGRESS NOTES
Sandra Robert is a 45 y.o. female presents to office for cough,     1.  Have you been to the ER, urgent care clinic or hospitalized since your last visit? no        Health Maintenance items with a due date reviewed with patient:  Health Maintenance Due   Topic Date Due    Hepatitis C Screening  1965    PAP AKA CERVICAL CYTOLOGY  05/14/1984    FOBT Q 1 YEAR AGE 50-75  05/14/2013    Influenza Age 9 to Adult  08/01/2017

## 2018-05-17 ENCOUNTER — HOSPITAL ENCOUNTER (OUTPATIENT)
Dept: MAMMOGRAPHY | Age: 53
Discharge: HOME | End: 2018-05-17
Attending: FAMILY MEDICINE
Payer: COMMERCIAL

## 2018-05-17 DIAGNOSIS — Z12.39 BREAST CANCER SCREENING: ICD-10-CM

## 2018-05-17 PROCEDURE — 77067 SCR MAMMO BI INCL CAD: CPT

## 2018-05-23 ENCOUNTER — TELEPHONE (OUTPATIENT)
Dept: FAMILY MEDICINE CLINIC | Age: 53
End: 2018-05-23

## 2018-05-23 NOTE — LETTER
5/23/2018 8:12 AM 
 
Ms. Candi Chavez [place] [address] [address] Dear Sirena Caballero I have reviewed your recent Mammogram and have found the results to be normal. 
 
 
 
 
Please call if you have any questions [phone] . Sincerely, Wayne Lanier MD

## 2018-05-23 NOTE — TELEPHONE ENCOUNTER
----- Message from Amando Edwards MD sent at 5/23/2018  5:55 AM EDT -----  Pls report normal mammo. Yearly mammo.

## 2018-07-10 ENCOUNTER — OFFICE VISIT (OUTPATIENT)
Dept: FAMILY MEDICINE CLINIC | Age: 53
End: 2018-07-10

## 2018-07-10 VITALS
BODY MASS INDEX: 38 KG/M2 | OXYGEN SATURATION: 97 % | HEIGHT: 64 IN | HEART RATE: 79 BPM | RESPIRATION RATE: 17 BRPM | SYSTOLIC BLOOD PRESSURE: 144 MMHG | TEMPERATURE: 97 F | WEIGHT: 222.6 LBS | DIASTOLIC BLOOD PRESSURE: 78 MMHG

## 2018-07-10 DIAGNOSIS — R10.9 STOMACH PAIN: Primary | ICD-10-CM

## 2018-07-10 DIAGNOSIS — R31.9 HEMATURIA, UNSPECIFIED TYPE: ICD-10-CM

## 2018-07-10 DIAGNOSIS — R10.32 INTERMITTENT LEFT LOWER QUADRANT ABDOMINAL PAIN: ICD-10-CM

## 2018-07-10 DIAGNOSIS — Z79.890 HORMONE REPLACEMENT THERAPY (HRT): ICD-10-CM

## 2018-07-10 DIAGNOSIS — K57.92 DIVERTICULITIS: ICD-10-CM

## 2018-07-10 LAB
BILIRUB UR QL STRIP: NEGATIVE
GLUCOSE UR-MCNC: NEGATIVE MG/DL
KETONES P FAST UR STRIP-MCNC: NEGATIVE MG/DL
PH UR STRIP: 5.5 [PH] (ref 4.6–8)
PROT UR QL STRIP: NEGATIVE
SP GR UR STRIP: 1.01 (ref 1–1.03)
UA UROBILINOGEN AMB POC: NORMAL (ref 0.2–1)
URINALYSIS CLARITY POC: CLEAR
URINALYSIS COLOR POC: YELLOW
URINE BLOOD POC: NORMAL
URINE LEUKOCYTES POC: NEGATIVE
URINE NITRITES POC: NEGATIVE

## 2018-07-10 RX ORDER — OXYCODONE AND ACETAMINOPHEN 5; 325 MG/1; MG/1
1 TABLET ORAL
Qty: 30 TAB | Refills: 0 | Status: SHIPPED | OUTPATIENT
Start: 2018-07-10 | End: 2018-12-14

## 2018-07-10 RX ORDER — CIPROFLOXACIN 500 MG/1
500 TABLET ORAL 2 TIMES DAILY
Qty: 20 TAB | Refills: 0 | Status: SHIPPED | OUTPATIENT
Start: 2018-07-10 | End: 2018-07-20

## 2018-07-10 RX ORDER — METRONIDAZOLE 500 MG/1
TABLET ORAL
Qty: 20 TAB | Refills: 0 | Status: SHIPPED | OUTPATIENT
Start: 2018-07-10 | End: 2018-12-14

## 2018-07-10 RX ORDER — ESTRADIOL 0.5 MG/1
0.5 TABLET ORAL DAILY
Qty: 90 TAB | Refills: 2 | Status: SHIPPED | OUTPATIENT
Start: 2018-07-10 | End: 2019-06-24 | Stop reason: SDUPTHER

## 2018-07-10 NOTE — PATIENT INSTRUCTIONS
Diverticulitis: Care Instructions  Your Care Instructions    Diverticulitis occurs when pouches form in the wall of the colon and become inflamed or infected. It can be very painful. Doctors aren't sure what causes diverticulitis. There is no proof that foods such as nuts, seeds, or berries cause it or make it worse. A low-fiber diet may cause the colon to work harder to push stool forward. Pouches may form because of this extra work. It may be hard to think about healthy eating while you're in pain. But as you recover, you might think about how you can use healthy eating for overall better health. Healthy eating may help you avoid future attacks. Follow-up care is a key part of your treatment and safety. Be sure to make and go to all appointments, and call your doctor if you are having problems. It's also a good idea to know your test results and keep a list of the medicines you take. How can you care for yourself at home? · Drink plenty of fluids, enough so that your urine is light yellow or clear like water. If you have kidney, heart, or liver disease and have to limit fluids, talk with your doctor before you increase the amount of fluids you drink. · Stick to liquids or a bland diet (plain rice, bananas, dry toast or crackers, applesauce) until you are feeling better. Then you can return to regular foods and gradually increase the amount of fiber in your diet. · Use a heating pad set on low on your belly to relieve mild cramps and pain. · Get extra rest until you are feeling better. · Be safe with medicines. Read and follow all instructions on the label. ¨ If the doctor gave you a prescription medicine for pain, take it as prescribed. ¨ If you are not taking a prescription pain medicine, ask your doctor if you can take an over-the-counter medicine. · If your doctor prescribed antibiotics, take them as directed. Do not stop taking them just because you feel better.  You need to take the full course of antibiotics. To prevent future attacks of diverticulitis  · Avoid constipation:  ¨ Include fruits, vegetables, beans, and whole grains in your diet each day. These foods are high in fiber. ¨ Drink plenty of fluids, enough so that your urine is light yellow or clear like water. If you have kidney, heart, or liver disease and have to limit fluids, talk with your doctor before you increase the amount of fluids you drink. ¨ Get some exercise every day. Build up slowly to 30 to 60 minutes a day on 5 or more days of the week. ¨ Take a fiber supplement, such as Citrucel or Metamucil, every day if needed. Read and follow all instructions on the label. ¨ Schedule time each day for a bowel movement. Having a daily routine may help. Take your time and do not strain when having a bowel movement. When should you call for help? Call your doctor now or seek immediate medical care if:    · You have a fever.     · You are vomiting.     · You have new or worse belly pain.     · You cannot pass stools or gas.    Watch closely for changes in your health, and be sure to contact your doctor if you have any problems. Where can you learn more? Go to http://charity-antoni.info/. Enter H901 in the search box to learn more about \"Diverticulitis: Care Instructions. \"  Current as of: May 12, 2017  Content Version: 11.7  © 0871-9930 [employer]. Care instructions adapted under license by [employer] (which disclaims liability or warranty for this information). If you have questions about a medical condition or this instruction, always ask your healthcare professional. [place] [address] any warranty or liability for your use of this information. Blood in the Urine: Care Instructions  Your Care Instructions    Blood in the urine, or hematuria, may make the urine look red, brown, or pink. There may be blood every time you urinate or just from time to time.  You cannot always see blood in the urine, but it will show up in a urine test.  Blood in the urine may be serious. It should always be checked by a doctor. Your doctor may recommend more tests, including an X-ray, a CT scan, or a cystoscopy (which lets a doctor look inside the urethra and bladder). Blood in the urine can be a sign of another problem. Common causes are bladder infections and kidney stones. An injury to your groin or your genital area can also cause bleeding in the urinary tract. Very hard exercise-such as running a marathon-can cause blood in the urine. Blood in the urine can also be a sign of kidney disease or cancer in the bladder or kidney. Many cases of blood in the urine are caused by a harmless condition that runs in families. This is called benign familial hematuria. It does not need any treatment. Sometimes your urine may look red or brown even though it does not contain blood. For example, not getting enough fluids (dehydration), taking certain medicines, or having a liver problem can change the color of your urine. Eating foods such as beets, rhubarb, or blackberries or foods with red food coloring can make your urine look red or pink. Follow-up care is a key part of your treatment and safety. Be sure to make and go to all appointments, and call your doctor if you are having problems. It's also a good idea to know your test results and keep a list of the medicines you take. When should you call for help? Call your doctor now or seek immediate medical care if:    · You have symptoms of a urinary infection. For example:  ¨ You have pus in your urine. ¨ You have pain in your back just below your rib cage. This is called flank pain. ¨ You have a fever, chills, or body aches. ¨ It hurts to urinate.   ¨ You have groin or belly pain.     · You have more blood in your urine.    Watch closely for changes in your health, and be sure to contact your doctor if:    · You have new urination problems.     · You do not get better as expected. Where can you learn more? Go to http://charity-antoni.info/. Enter J391 in the search box to learn more about \"Blood in the Urine: Care Instructions. \"  Current as of: May 12, 2017  Content Version: 11.7  © 0189-0316 [employer]. Care instructions adapted under license by [employer] (which disclaims liability or warranty for this information). If you have questions about a medical condition or this instruction, always ask your healthcare professional. [address] any warranty or liability for your use of this information.

## 2018-07-10 NOTE — PROGRESS NOTES
Jesus Cabello is a 52 y.o.  female and presents with a few days of lower left abd pain which is intermittent. Patient denies any dysuria but has a hx of diverticulitis in the past. Patient also for refill of HRT med. No c/o of side effects. Chief Complaint   Patient presents with    Abdominal Pain     Subjective: Additional Concerns: none     Patient Active Problem List    Diagnosis Date Noted    Vaginal discharge 01/11/2017    Weight gain 09/09/2016    HTN (hypertension) 06/01/2016     Current Outpatient Prescriptions   Medication Sig Dispense Refill    estradiol (ESTRACE) 0.5 mg tablet Take 1 Tab by mouth daily. 90 Tab 2    oxyCODONE-acetaminophen (PERCOCET) 5-325 mg per tablet Take 1 Tab by mouth every six (6) hours as needed for Pain. Max Daily Amount: 4 Tabs. Indications: Pain 30 Tab 0    metroNIDAZOLE (FLAGYL) 500 mg tablet Take one tab po BID x 10 days  Indications: Diverticulitis of Gastrointestinal Tract 20 Tab 0    ciprofloxacin HCl (CIPRO) 500 mg tablet Take 1 Tab by mouth two (2) times a day for 10 days. 20 Tab 0    atenolol (TENORMIN) 100 mg tablet Take 1 Tab by mouth two (2) times a day. 180 Tab 2    ibuprofen (MOTRIN) 800 mg tablet Take 1 Tab by mouth every eight (8) hours as needed for Pain. 90 Tab 2    chlorpheniramine-HYDROcodone (TUSSIONEX) 10-8 mg/5 mL suspension 5 mL.  albuterol (PROVENTIL HFA, VENTOLIN HFA, PROAIR HFA) 90 mcg/actuation inhaler 1-2 Puffs.  fluticasone-salmeterol (ADVAIR DISKUS) 250-50 mcg/dose diskus inhaler Take 1 Puff by inhalation every twelve (12) hours. 1 Inhaler 1    lorcaserin (BELVIQ) 10 mg tab Take 10 mg by mouth two (2) times a day. Max Daily Amount: 20 mg. Indications: WEIGHT LOSS MANAGEMENT FOR OBESE PATIENT (BMI >= 30) 60 Tab 2    testosterone (ANDROGEL) 1 % (50 mg/5 gram) glpk 1 Packet by TransDERmal route daily.  Max Daily Amount: 50 mg. 2% topical. Apply 0.5mg to wrist daily 30 Packet 0    HYDROcodone-acetaminophen (Natalia Jay) 5-325 mg per tablet Take 1-2 Tabs by mouth every six (6) hours as needed for Pain. Indications: PAIN 30 Tab 0    rosuvastatin (CRESTOR) 20 mg tablet Take 1 Tab by mouth nightly.  Indications: HYPERLIPIDEMIA 30 Tab 2     Allergies   Allergen Reactions    Crestor [Rosuvastatin] Nausea Only     Past Medical History:   Diagnosis Date    Gynecological disorder     fibroids and low test.     Hypertension     Neck pain      Past Surgical History:   Procedure Laterality Date    HX GYN      HX HYSTERECTOMY      fibroids     HX HYSTERECTOMY      removal of ovaries     Family History   Problem Relation Age of Onset    Hypertension Mother     High Cholesterol Mother     No Known Problems Father      Social History   Substance Use Topics    Smoking status: Never Smoker    Smokeless tobacco: Never Used    Alcohol use Yes      Comment: socailly      ROS     General: negative for - chills, fatigue, fever, weight change  Endo: negative for - hot flashes, polydipsia/polyuria or temperature intolerance  Resp: negative for - cough, shortness of breath or wheezing  CV: negative for - chest pain, edema or palpitations  GI: positive for - left lower flank and abdominal pain, change in bowel habits, positive constipation, no diarrhea or nausea/vomiting  : negative for - dysuria, hematuria, incontinence, pelvic pain or vulvar/vaginal symptoms      Objective:  Vitals:    07/10/18 1620   BP: 144/78   Pulse: 79   Resp: 17   Temp: 97 °F (36.1 °C)   TempSrc: Oral   SpO2: 97%   Weight: 222 lb 9.6 oz (101 kg)   Height: 5' 4\" (1.626 m)   PainSc:   6     PE    Alert, well appearing, and in no distress, oriented to person, place, and time and overweight  General appearance - alert, well appearing, and in no distress, oriented to person, place, and time and overweight  Mental status - alert, oriented to person, place, and time, normal mood, behavior, speech, dress, motor activity, and thought processes  Chest - clear to auscultation, no wheezes, rales or rhonchi, symmetric air entry  Heart - normal rate, regular rhythm, normal S1, S2, no murmurs, rubs, clicks or gallops  Abdomen - soft, tender on the LLQ and flank, nondistended, no masses or organomegaly  Musculoskeletal - no joint tenderness, deformity or swelling    LABS   Office Visit on 07/10/2018   Component Date Value Ref Range Status    Color (UA POC) 07/10/2018 Yellow   Final    Clarity (UA POC) 07/10/2018 Clear   Final    Glucose (UA POC) 07/10/2018 Negative  Negative Final    Bilirubin (UA POC) 07/10/2018 Negative  Negative Final    Ketones (UA POC) 07/10/2018 Negative  Negative Final    Specific gravity (UA POC) 07/10/2018 1.015  1.001 - 1.035 Final    Blood (UA POC) 07/10/2018 1+  Negative Final    pH (UA POC) 07/10/2018 5.5  4.6 - 8.0 Final    Protein (UA POC) 07/10/2018 Negative  Negative Final    Urobilinogen (UA POC) 07/10/2018 0.2 mg/dL  0.2 - 1 Final    Nitrites (UA POC) 07/10/2018 Negative  Negative Final    Leukocyte esterase (UA POC) 07/10/2018 Negative  Negative Final       TESTS  Results for orders placed or performed in visit on 07/10/18   AMB POC URINALYSIS DIP STICK AUTO W/O MICRO   Result Value Ref Range    Color (UA POC) Yellow     Clarity (UA POC) Clear     Glucose (UA POC) Negative Negative    Bilirubin (UA POC) Negative Negative    Ketones (UA POC) Negative Negative    Specific gravity (UA POC) 1.015 1.001 - 1.035    Blood (UA POC) 1+ Negative    pH (UA POC) 5.5 4.6 - 8.0    Protein (UA POC) Negative Negative    Urobilinogen (UA POC) 0.2 mg/dL 0.2 - 1    Nitrites (UA POC) Negative Negative    Leukocyte esterase (UA POC) Negative Negative     Assessment/Plan:      1. Stomach pain suspicious for clinical diverticulitis  - AMB POC URINALYSIS DIP STICK AUTO W/O MICRO - 1+ hematuria   - oxyCODONE-acetaminophen (PERCOCET) 5-325 mg per tablet; Take 1 Tab by mouth every six (6) hours as needed for Pain. Max Daily Amount: 4 Tabs.  Indications: Pain  Dispense: 30 Tab; Refill: 0  - metroNIDAZOLE (FLAGYL) 500 mg tablet; Take one tab po BID x 10 days  Indications: Diverticulitis of Gastrointestinal Tract  Dispense: 20 Tab; Refill: 0  - ciprofloxacin HCl (CIPRO) 500 mg tablet; Take 1 Tab by mouth two (2) times a day for 10 days. Dispense: 20 Tab; Refill: 0    2. Hematuria, unspecified type - rule out renal calculi/colic. - XR ABD (KUB); Future    3. Intermittent left lower quadrant abdominal pain  - XR ABD (KUB); Future    4. Diverticulitis - Retrial of   - metroNIDAZOLE (FLAGYL) 500 mg tablet; Take one tab po BID x 10 days  Indications: Diverticulitis of Gastrointestinal Tract  Dispense: 20 Tab; Refill: 0  - ciprofloxacin HCl (CIPRO) 500 mg tablet; Take 1 Tab by mouth two (2) times a day for 10 days. Dispense: 20 Tab; Refill: 0    5. Hormone replacement therapy (HRT)- estradiol (ESTRACE) 0.5 mg tablet; Take 1 Tab by mouth daily. Dispense: 90 Tab; Refill: 2    Lab review: no lab studies available for review at time of visit. I have discussed the diagnosis with the patient and the intended plan as seen in the above orders. The patient has received an after-visit summary and questions were answered concerning future plans. I have discussed medication side effects and warnings with the patient as well. I have reviewed the plan of care with the patient, accepted their input and they are in agreement with the treatment goals.      F/U in 10 days    Naren Zamudio MD

## 2018-07-10 NOTE — PROGRESS NOTES
Tay Gottlieb is a 52 y.o. female presents to office for abdominal pain for 3 days    Medication list has been reviewed with Tay Gottlieb and updated as of today's date     Health Maintenance items with a due date reviewed with patient:  Health Maintenance Due   Topic Date Due    Hepatitis C Screening  1965    PAP AKA CERVICAL CYTOLOGY  05/14/1984

## 2018-07-12 ENCOUNTER — TELEPHONE (OUTPATIENT)
Dept: FAMILY MEDICINE CLINIC | Age: 53
End: 2018-07-12

## 2018-07-12 NOTE — TELEPHONE ENCOUNTER
----- Message from Braydon Garcia MD sent at 7/12/2018  9:08 AM EDT -----  Pls inform patient no renal stones and showed mild constipation like she said. Planb remains the same empitic treatment with abx for possible diverticulitis. F/U in 10 days only if not better.

## 2018-07-12 NOTE — TELEPHONE ENCOUNTER
Patient has been called with no answer. Unable to leave message. Will try patient again at a later time.

## 2018-07-23 NOTE — TELEPHONE ENCOUNTER
Spoke to Samanta Cr and stated that the issue has been resolved and the patient was able to  Rx. Closing encounter.

## 2018-12-14 ENCOUNTER — OFFICE VISIT (OUTPATIENT)
Dept: FAMILY MEDICINE CLINIC | Age: 53
End: 2018-12-14

## 2018-12-14 VITALS
HEART RATE: 87 BPM | SYSTOLIC BLOOD PRESSURE: 147 MMHG | DIASTOLIC BLOOD PRESSURE: 75 MMHG | TEMPERATURE: 98.1 F | BODY MASS INDEX: 38.24 KG/M2 | WEIGHT: 224 LBS | RESPIRATION RATE: 17 BRPM | OXYGEN SATURATION: 99 % | HEIGHT: 64 IN

## 2018-12-14 DIAGNOSIS — I10 ESSENTIAL HYPERTENSION: ICD-10-CM

## 2018-12-14 DIAGNOSIS — I10 ESSENTIAL HYPERTENSION: Primary | ICD-10-CM

## 2018-12-14 DIAGNOSIS — R73.03 PREDIABETES: ICD-10-CM

## 2018-12-14 DIAGNOSIS — E66.01 SEVERE OBESITY (HCC): ICD-10-CM

## 2018-12-14 DIAGNOSIS — E78.2 MIXED HYPERLIPIDEMIA: ICD-10-CM

## 2018-12-14 RX ORDER — ATENOLOL 100 MG/1
100 TABLET ORAL DAILY
Qty: 180 TAB | Refills: 1 | Status: CANCELLED | OUTPATIENT
Start: 2018-12-14

## 2018-12-14 RX ORDER — LOSARTAN POTASSIUM 50 MG/1
50 TABLET ORAL DAILY
Qty: 30 TAB | Refills: 2 | Status: SHIPPED | OUTPATIENT
Start: 2018-12-14 | End: 2019-03-17 | Stop reason: SDUPTHER

## 2018-12-14 NOTE — PROGRESS NOTES
[address] [place] [address]     Chief Complaint   Patient presents with    Hypertension     Vitals:    12/14/18 1618   BP: 147/75   Pulse: 87   Resp: 17   Temp: 98.1 °F (36.7 °C)   TempSrc: Oral   SpO2: 99%   Weight: 224 lb (101.6 kg)   Height: 5' 4\" (1.626 m)   PainSc:   0 - No pain         HPI: Follow-up hypertension to get refill on her medication she has been on atenolol 100 mg daily for years, patient has prediabetes and hyperlipidemia. She is obese with BMI of 38 and she had weight gain in the last few months about 10 pounds. 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 Past Medical History:   Diagnosis Date    Gynecological disorder     fibroids and low test.     Hypertension     Neck pain      Past Surgical History:   Procedure Laterality Date    HX GYN      HX HYSTERECTOMY      fibroids     HX HYSTERECTOMY      removal of ovaries     Social History     Tobacco Use    Smoking status: Never Smoker    Smokeless tobacco: Never Used   Substance Use Topics    Alcohol use: Yes     Comment: socailly        Family History   Problem Relation Age of Onset    Hypertension Mother     High Cholesterol Mother     No Known Problems Father        Review of Systems   Constitutional: Negative for chills, fever, malaise/fatigue and weight loss. HENT: Negative for congestion, ear discharge, ear pain, hearing loss, nosebleeds, sinus pain and sore throat. Eyes: Negative for blurred vision, double vision and discharge. Respiratory: Negative for cough, hemoptysis, sputum production, shortness of breath and wheezing. Cardiovascular: Negative for chest pain, palpitations, claudication and leg swelling. Gastrointestinal: Negative for abdominal pain, constipation, diarrhea, nausea and vomiting. Genitourinary: Negative for dysuria, frequency and urgency. Musculoskeletal: Negative for back pain, joint pain, myalgias and neck pain.    Skin: Negative for itching and rash. Neurological: Positive for sensory change. Negative for dizziness, tingling, speech change, focal weakness, weakness and headaches. Psychiatric/Behavioral: Negative for depression, hallucinations, substance abuse and suicidal ideas. The patient is not nervous/anxious. Physical Exam   Constitutional: She is oriented to person, place, and time. She appears well-developed and well-nourished. No distress. HENT:   Head: Normocephalic and atraumatic. Mouth/Throat: Oropharynx is clear and moist. No oropharyngeal exudate. Eyes: Conjunctivae are normal. Pupils are equal, round, and reactive to light. Right eye exhibits no discharge. Left eye exhibits no discharge. No scleral icterus. Neck: Normal range of motion. No thyromegaly present. Cardiovascular: Normal rate, regular rhythm and normal heart sounds. No murmur heard. Pulmonary/Chest: Effort normal and breath sounds normal. No respiratory distress. She has no wheezes. She has no rales. She exhibits no tenderness. Abdominal: Soft. She exhibits no distension. There is no tenderness. There is no rebound. Musculoskeletal: Normal range of motion. She exhibits no edema, tenderness or deformity. Lymphadenopathy:     She has no cervical adenopathy. Neurological: She is alert and oriented to person, place, and time. No cranial nerve deficit. Coordination normal.   Skin: Skin is warm and dry. No rash noted. She is not diaphoretic. No erythema. No pallor. Psychiatric: She has a normal mood and affect. Her behavior is normal. Judgment and thought content normal.   Nursing note and vitals reviewed. Assessment and plan     Plan of care has been discussed with the patient, he agrees to the plan and verbalized understanding. All his questions were answered  More than 50% of the time spent in this visit was counseling the patient about  illness and treatment options         1.  Essential hypertension  Atenolol and start patient on losartan 50 mg daily and follow-up in 3 weeks for blood pressure check  - METABOLIC PANEL, COMPREHENSIVE; Future  - losartan (COZAAR) 50 mg tablet; Take 1 Tab by mouth daily. Dispense: 30 Tab; Refill: 2    2. Mixed hyperlipidemia  Is noted and a statin will repeat lipid profile and get a risk assessment    - LIPID PANEL; Future  - METABOLIC PANEL, COMPREHENSIVE; Future    3. Prediabetes    - HEMOGLOBIN A1C W/O EAG; Future    4. Severe obesity ([address]  Medication was increased activity and improving diet has been discussed with patient at length she is aware what she need to do but due to her busy lifestyle that she can apply labs on medication with diet nor with exercise. But she is planning to work on her diet and organize meals and physical activity. Current Outpatient Medications   Medication Sig Dispense Refill    losartan (COZAAR) 50 mg tablet Take 1 Tab by mouth daily. 30 Tab 2    atenolol (TENORMIN) 100 mg tablet TAKE ONE TABLET BY MOUTH TWICE DAILY 60 Tab 0    estradiol (ESTRACE) 0.5 mg tablet Take 1 Tab by mouth daily. 90 Tab 2    ibuprofen (MOTRIN) 800 mg tablet Take 1 Tab by mouth every eight (8) hours as needed for Pain.  90 Tab 2       Patient Active Problem List    Diagnosis Date Noted    Severe obesity ([facility] [address] 12/14/2018    Mixed hyperlipidemia 12/14/2018    HTN (hypertension) 06/01/2016     Results for orders placed or performed in visit on 07/10/18   AMB POC URINALYSIS DIP STICK AUTO W/O MICRO   Result Value Ref Range    Color (UA POC) Yellow     Clarity (UA POC) Clear     Glucose (UA POC) Negative Negative    Bilirubin (UA POC) Negative Negative    Ketones (UA POC) Negative Negative    Specific gravity (UA POC) 1.015 1.001 - 1.035    Blood (UA POC) 1+ Negative    pH (UA POC) 5.5 4.6 - 8.0    Protein (UA POC) Negative Negative    Urobilinogen (UA POC) 0.2 mg/dL 0.2 - 1    Nitrites (UA POC) Negative Negative    Leukocyte esterase (UA POC) Negative Negative     No visits with results within 3 Month(s) from this visit. Latest known visit with results is:   Office Visit on 07/10/2018   Component Date Value Ref Range Status    Color (UA POC) 07/10/2018 Yellow   Final    Clarity (UA POC) 07/10/2018 Clear   Final    Glucose (UA POC) 07/10/2018 Negative  Negative Final    Bilirubin (UA POC) 07/10/2018 Negative  Negative Final    Ketones (UA POC) 07/10/2018 Negative  Negative Final    Specific gravity (UA POC) 07/10/2018 1.015  1.001 - 1.035 Final    Blood (UA POC) 07/10/2018 1+  Negative Final    pH (UA POC) 07/10/2018 5.5  4.6 - 8.0 Final    Protein (UA POC) 07/10/2018 Negative  Negative Final    Urobilinogen (UA POC) 07/10/2018 0.2 mg/dL  0.2 - 1 Final    Nitrites (UA POC) 07/10/2018 Negative  Negative Final    Leukocyte esterase (UA POC) 07/10/2018 Negative  Negative Final          Follow-up Disposition:  Return in about 3 weeks (around 1/4/2019).

## 2018-12-14 NOTE — PROGRESS NOTES
Leslie Alvarado is a 52 y.o. female presents to office for htn    Medication list has been reviewed with Leslie Alvarado and updated as of today's date     Health Maintenance items with a due date reviewed with patient:  Health Maintenance Due   Topic Date Due    Hepatitis C Screening  1965    PAP AKA CERVICAL CYTOLOGY  05/14/1984    Shingrix Vaccine Age 50> (1 of 2) 05/14/2013

## 2018-12-17 ENCOUNTER — HOSPITAL ENCOUNTER (OUTPATIENT)
Dept: LAB | Age: 53
Discharge: HOME | End: 2018-12-17

## 2018-12-17 PROCEDURE — 99001 SPECIMEN HANDLING PT-LAB: CPT

## 2018-12-18 LAB
ALBUMIN SERPL-MCNC: 4.4 G/DL (ref 3.5–5.5)
ALBUMIN/GLOB SERPL: 1.7 {RATIO} (ref 1.2–2.2)
ALP SERPL-CCNC: 58 IU/L (ref 39–117)
ALT SERPL-CCNC: 19 IU/L (ref 0–32)
AST SERPL-CCNC: 22 IU/L (ref 0–40)
BILIRUB SERPL-MCNC: 0.3 MG/DL (ref 0–1.2)
BUN SERPL-MCNC: 9 MG/DL (ref 6–24)
BUN/CREAT SERPL: 11 (ref 9–23)
CALCIUM SERPL-MCNC: 9 MG/DL (ref 8.7–10.2)
CHLORIDE SERPL-SCNC: 105 MMOL/L (ref 96–106)
CHOLEST SERPL-MCNC: 176 MG/DL (ref 100–199)
CO2 SERPL-SCNC: 23 MMOL/L (ref 20–29)
CREAT SERPL-MCNC: 0.83 MG/DL (ref 0.57–1)
GLOBULIN SER CALC-MCNC: 2.6 G/DL (ref 1.5–4.5)
GLUCOSE SERPL-MCNC: 116 MG/DL (ref 65–99)
HBA1C MFR BLD: 6.7 % (ref 4.8–5.6)
HDLC SERPL-MCNC: 52 MG/DL
INTERPRETATION, 910389: NORMAL
LDLC SERPL CALC-MCNC: 107 MG/DL (ref 0–99)
Lab: NORMAL
POTASSIUM SERPL-SCNC: 4 MMOL/L (ref 3.5–5.2)
PROT SERPL-MCNC: 7 G/DL (ref 6–8.5)
SODIUM SERPL-SCNC: 144 MMOL/L (ref 134–144)
TRIGL SERPL-MCNC: 83 MG/DL (ref 0–149)
VLDLC SERPL CALC-MCNC: 17 MG/DL (ref 5–40)

## 2018-12-18 NOTE — PROGRESS NOTES
Blood sugar is elevated and hemoglobin A1c increased from 6.1 to 6.7 which is now in the range of diabetes that would be discussed and the patient upcoming follow-up and treatment option will be discussed as well.

## 2018-12-26 ENCOUNTER — TELEPHONE (OUTPATIENT)
Dept: FAMILY MEDICINE CLINIC | Age: 53
End: 2018-12-26

## 2018-12-26 NOTE — TELEPHONE ENCOUNTER
Son Villeda MD  [facility]             Blood sugar is elevated and hemoglobin A1c increased from 6.1 to 6.7 which is now in the range of diabetes that would be discussed and the patient upcoming follow-up and treatment option will be discussed as well.

## 2019-01-04 ENCOUNTER — OFFICE VISIT (OUTPATIENT)
Dept: FAMILY MEDICINE CLINIC | Age: 54
End: 2019-01-04

## 2019-01-04 VITALS
HEART RATE: 90 BPM | WEIGHT: 221 LBS | DIASTOLIC BLOOD PRESSURE: 82 MMHG | OXYGEN SATURATION: 99 % | RESPIRATION RATE: 17 BRPM | SYSTOLIC BLOOD PRESSURE: 140 MMHG | HEIGHT: 64 IN | BODY MASS INDEX: 37.73 KG/M2 | TEMPERATURE: 98.7 F

## 2019-01-04 DIAGNOSIS — E78.2 MIXED HYPERLIPIDEMIA: ICD-10-CM

## 2019-01-04 DIAGNOSIS — E66.01 SEVERE OBESITY (HCC): Primary | ICD-10-CM

## 2019-01-04 DIAGNOSIS — E11.8 CONTROLLED TYPE 2 DIABETES MELLITUS WITH COMPLICATION, WITHOUT LONG-TERM CURRENT USE OF INSULIN (HCC): ICD-10-CM

## 2019-01-04 DIAGNOSIS — I10 ESSENTIAL HYPERTENSION: ICD-10-CM

## 2019-01-04 RX ORDER — METFORMIN HYDROCHLORIDE 500 MG/1
500 TABLET ORAL 2 TIMES DAILY WITH MEALS
Qty: 60 TAB | Refills: 2 | Status: SHIPPED | OUTPATIENT
Start: 2019-01-04 | End: 2019-05-08 | Stop reason: SDUPTHER

## 2019-01-04 NOTE — PATIENT INSTRUCTIONS
Learning About Diabetes Food Guidelines  Your Care Instructions    Meal planning is important to manage diabetes. It helps keep your blood sugar at a target level (which you set with your doctor). You don't have to eat special foods. You can eat what your family eats, including sweets once in a while. But you do have to pay attention to how often you eat and how much you eat of certain foods. You may want to work with a dietitian or a certified diabetes educator (CDE) to help you plan meals and snacks. A dietitian or CDE can also help you lose weight if that is one of your goals. What should you know about eating carbs? Managing the amount of carbohydrate (carbs) you eat is an important part of healthy meals when you have diabetes. Carbohydrate is found in many foods. · Learn which foods have carbs. And learn the amounts of carbs in different foods. ? Bread, cereal, pasta, and rice have about 15 grams of carbs in a serving. A serving is 1 slice of bread (1 ounce), ½ cup of cooked cereal, or 1/3 cup of cooked pasta or rice. ? Fruits have 15 grams of carbs in a serving. A serving is 1 small fresh fruit, such as an apple or orange; ½ of a banana; ½ cup of cooked or canned fruit; ½ cup of fruit juice; 1 cup of melon or raspberries; or 2 tablespoons of dried fruit. ? Milk and no-sugar-added yogurt have 15 grams of carbs in a serving. A serving is 1 cup of milk or 2/3 cup of no-sugar-added yogurt. ? Starchy vegetables have 15 grams of carbs in a serving. A serving is ½ cup of mashed potatoes or sweet potato; 1 cup winter squash; ½ of a small baked potato; ½ cup of cooked beans; or ½ cup cooked corn or green peas. · Learn how much carbs to eat each day and at each meal. A dietitian or CDE can teach you how to keep track of the amount of carbs you eat. This is called carbohydrate counting. · If you are not sure how to count carbohydrate grams, use the Plate Method to plan meals.  It is a good, quick way to make sure that you have a balanced meal. It also helps you spread carbs throughout the day. ? Divide your plate by types of foods. Put non-starchy vegetables on half the plate, meat or other protein food on one-quarter of the plate, and a grain or starchy vegetable in the final quarter of the plate. To this you can add a small piece of fruit and 1 cup of milk or yogurt, depending on how many carbs you are supposed to eat at a meal.  · Try to eat about the same amount of carbs at each meal. Do not \"save up\" your daily allowance of carbs to eat at one meal.  · Proteins have very little or no carbs per serving. Examples of proteins are beef, chicken, turkey, fish, eggs, tofu, cheese, cottage cheese, and peanut butter. A serving size of meat is 3 ounces, which is about the size of a deck of cards. Examples of meat substitute serving sizes (equal to 1 ounce of meat) are 1/4 cup of cottage cheese, 1 egg, 1 tablespoon of peanut butter, and ½ cup of tofu. How can you eat out and still eat healthy? · Learn to estimate the serving sizes of foods that have carbohydrate. If you measure food at home, it will be easier to estimate the amount in a serving of restaurant food. · If the meal you order has too much carbohydrate (such as potatoes, corn, or baked beans), ask to have a low-carbohydrate food instead. Ask for a salad or green vegetables. · If you use insulin, check your blood sugar before and after eating out to help you plan how much to eat in the future. · If you eat more carbohydrate at a meal than you had planned, take a walk or do other exercise. This will help lower your blood sugar. What else should you know? · Limit saturated fat, such as the fat from meat and dairy products. This is a healthy choice because people who have diabetes are at higher risk of heart disease. So choose lean cuts of meat and nonfat or low-fat dairy products.  Use olive or canola oil instead of butter or shortening when cooking. · Don't skip meals. Your blood sugar may drop too low if you skip meals and take insulin or certain medicines for diabetes. · Check with your doctor before you drink alcohol. Alcohol can cause your blood sugar to drop too low. Alcohol can also cause a bad reaction if you take certain diabetes medicines. Follow-up care is a key part of your treatment and safety. Be sure to make and go to all appointments, and call your doctor if you are having problems. It's also a good idea to know your test results and keep a list of the medicines you take. Where can you learn more? Go to http://charity-antoni.info/. Enter F459 in the search box to learn more about \"Learning About Diabetes Food Guidelines. \"  Current as of: December 7, 2017  Content Version: 11.8  © 9814-6892 [employer]. Care instructions adapted under license by [employer] (which disclaims liability or warranty for this information). If you have questions about a medical condition or this instruction, always ask your healthcare professional. [place] [address] any warranty or liability for your use of this information. Type 2 Diabetes: Care Instructions  Your Care Instructions    Type 2 diabetes is a disease that develops when the body's tissues cannot use insulin properly. Over time, the pancreas cannot make enough insulin. Insulin is a hormone that helps the body's cells use sugar (glucose) for energy. It also helps the body store extra sugar in muscle, fat, and liver cells. Without insulin, the sugar cannot get into the cells to do its work. It stays in the blood instead. This can cause high blood sugar levels. A person has diabetes when the blood sugar stays too high too much of the time. Over time, diabetes can lead to diseases of the heart, blood vessels, nerves, kidneys, and eyes.   You may be able to control your blood sugar by losing weight, eating a healthy diet, and getting daily exercise. You may also have to take insulin or other diabetes medicine. Follow-up care is a key part of your treatment and safety. Be sure to make and go to all appointments. Call your doctor if you are having problems. It's also a good idea to know your test results and keep a list of the medicines you take. How can you care for yourself at home? · Keep your blood sugar at a target level (which you set with your doctor). ? Eat a good diet that spreads carbohydrate throughout the day. Carbohydrate--the body's main source of fuel--affects blood sugar more than any other nutrient. Carbohydrate is in fruits, vegetables, milk, and yogurt. It also is in breads, cereals, vegetables such as potatoes and corn, and sugary foods such as candy and cakes. ? Aim for 30 minutes of exercise on most, preferably all, days of the week. Walking is a good choice. You also may want to do other activities, such as running, swimming, cycling, or playing tennis or team sports. If your doctor says it's okay, do muscle-strengthening exercises at least 2 times a week. ? Take your medicines exactly as prescribed. Call your doctor if you think you are having a problem with your medicine. You will get more details on the specific medicines your doctor prescribes. · Check your blood sugar as often as your doctor recommends. It is important to keep track of any symptoms you have, such as low blood sugar. Also tell your doctor if you have any changes in your activities, diet, or insulin use. · Talk to your doctor before you start taking aspirin every day. Aspirin can help certain people lower their risk of a heart attack or stroke. But taking aspirin isn't right for everyone, because it can cause serious bleeding. · Do not smoke. If you need help quitting, talk to your doctor about stop-smoking programs and medicines. These can increase your chances of quitting for good. · Keep your cholesterol and blood pressure at normal levels. You may need to take one or more medicines to reach your goals. Take them exactly as directed. Do not stop or change a medicine without talking to your doctor first.  When should you call for help? Call 911 anytime you think you may need emergency care. For example, call if:    · You passed out (lost consciousness), or you suddenly become very sleepy or confused. (You may have very low blood sugar.)    Call your doctor now or seek immediate medical care if:    · Your blood sugar is 300 mg/dL or is higher than the level your doctor has set for you.     · You have symptoms of low blood sugar, such as:  ? Sweating. ? Feeling nervous, shaky, and weak. ? Extreme hunger and slight nausea. ? Dizziness and headache.  ? Blurred vision. ? Confusion.    Watch closely for changes in your health, and be sure to contact your doctor if:    · You often have problems controlling your blood sugar.     · You have symptoms of long-term diabetes problems, such as:  ? New vision changes. ? New pain, numbness, or tingling in your hands or feet. ? Skin problems. Where can you learn more? Go to http://charity-antoni.info/. Enter C553 in the search box to learn more about \"Type 2 Diabetes: Care Instructions. \"  Current as of: December 7, 2017  Content Version: 11.8  © 6125-1244 [employer]. Care instructions adapted under license by [employer] (which disclaims liability or warranty for this information). If you have questions about a medical condition or this instruction, always ask your healthcare professional. [place] [address] any warranty or liability for your use of this information. Learning About Metformin for Type 2 Diabetes  Introduction    Metformin (such as Glucophage) is a medicine used to treat type 2 diabetes. It helps keep blood sugar levels on target.   You may have tried to eat a healthy diet, lose weight, and get more exercise to keep your blood sugar in your target range. If those things do not help, you may take a medicine called metformin. It helps your body use insulin. This can help you control your blood sugar. You might take it on its own or with other medicines. When taken on its own, metformin should not cause low blood sugar or weight gain. Example  · Metformin (Glucophage)  Possible side effects  Common side effects include:  · Short-term nausea. · Not feeling hungry. · Diarrhea. · Increased gas in your belly. · A metallic taste. You may have side effects or reactions not listed here. Check the information that comes with your medicine. What to know about taking this medicine  · Metformin does not usually cause low blood sugar. But you may get a low blood sugar when you take metformin and you exercise hard, drink alcohol, or you do not eat enough food. · Sometimes metformin is combined with other diabetes medicine. Some of these can cause low blood sugar. · If you need a test that uses a dye or you need to have surgery, be sure to tell all of your doctors that you take metformin. You may have to stop taking it before and after the test or surgery. · Over time, blood levels of vitamin B12 can decrease in some people who take metformin. Your body needs this B vitamin to make blood cells. It also keeps your nervous system healthy. If you have been taking metformin for more than a few years, ask your doctor if you need a B12 blood test to measure the amount of vitamin B12 in your blood. · Be safe with medicines. Take your medicines exactly as prescribed. Call your doctor if you think you are having a problem with your medicine. · Check with your doctor or pharmacist before you use any other medicines. This includes over-the-counter medicines. Make sure your doctor knows all of the medicines, vitamins, herbal products, and supplements you take. Taking some medicines together can cause problems. Where can you learn more?   Go to http://charity-antoni.info/. Enter Kwasi Argue in the search box to learn more about \"Learning About Metformin for Type 2 Diabetes. \"  Current as of: December 7, 2017  Content Version: 11.8  © 2006-2018 [employer]. Care instructions adapted under license by [employer] (which disclaims liability or warranty for this information). If you have questions about a medical condition or this instruction, always ask your healthcare professional. [address] any warranty or liability for your use of this information. Noninsulin Medicines for Type 2 Diabetes: Care Instructions  Your Care Instructions    There are different types of noninsulin medicines for diabetes. Each works in a different way. But they all help you control your blood sugar. Some types help your body make insulin to lower your blood sugar. Others lower how much insulin your body needs. Some can slow how fast your body digests sugars. And some can remove extra glucose through your urine. · Alpha-glucosidase inhibitors. These keep starches from breaking down. This means that they lower the amount of glucose absorbed when you eat. They don't help your body make more insulin. So they will not cause low blood sugar unless you use them with other medicines for diabetes. They include acarbose and miglitol. · DPP-4 inhibitors. These help your body raise the level of insulin after you eat. They also help your body make less of a hormone that raises blood sugar. They include linagliptin, saxagliptin, and sitagliptin. · Incretin hormones (GLP-1 receptor agonists). Your body makes a protein that can raise your insulin level. It also can lower your blood sugar and make you less hungry. You can get shots of hormones that work the same way. They include exenatide and liraglutide. · Meglitinides. These help your body release insulin. They also help slow how your body digests sugars.  So they can keep your blood sugar from rising too fast after you eat. They include nateglinide and repaglinide. · Metformin. This lowers how much glucose your liver makes. And it helps you respond better to insulin. It also lowers the amount of stored sugar that your liver releases when you are not eating. · SGLT2 inhibitors. These help to remove extra glucose through your urine. They may also help some people lose weight. They include canagliflozin, dapagliflozin, and empagliflozin. · Sulfonylureas. These help your body release more insulin. Some work for many hours. They can cause low blood sugar if you don't eat as you planned. They include glipizide and glyburide. · Thiazolidinediones. These reduce the amount of blood glucose. They also help you respond better to insulin. They include pioglitazone and rosiglitazone. You may need to take more than one medicine for diabetes. Two or more medicines may work better to lower your blood sugar level than just one does. Follow-up care is a key part of your treatment and safety. Be sure to make and go to all appointments, and call your doctor if you are having problems. It's also a good idea to know your test results and keep a list of the medicines you take. How can you care for yourself at home? · Eat a healthy diet. Get some exercise each day. This may help you to reduce how much medicine you need. · Do not take other prescription or over-the-counter medicines, vitamins, herbal products, or supplements without talking to your doctor first. Some medicines for type 2 diabetes can cause problems with other medicines or supplements. · Tell your doctor if you plan to get pregnant. Some of these drugs are not safe for pregnant women. · Be safe with medicines. Take your medicines exactly as prescribed. Meglitinides and sulfonylureas can cause your blood sugar to drop very low. Call your doctor if you think you are having a problem with your medicine. · Check your blood sugar often.  You can use a glucose monitor. Keeping track can help you know how certain foods, activities, and medicines affect your blood sugar. And it can help you keep your blood sugar from getting so low that it's not safe. When should you call for help? Call 911 anytime you think you may need emergency care. For example, call if:    · You passed out (lost consciousness).     · You are confused or cannot think clearly.     · Your blood sugar is very high or very low.    Watch closely for changes in your health, and be sure to contact your doctor if:    · Your blood sugar stays outside the level your doctor set for you.     · You have any problems. Where can you learn more? Go to http://charityNavigating Cancerantoni.info/. Enter H153 in the search box to learn more about \"Noninsulin Medicines for Type 2 Diabetes: Care Instructions. \"  Current as of: December 7, 2017  Content Version: 11.8  © 5245-6463 [employer]. Care instructions adapted under license by [employer] (which disclaims liability or warranty for this information). If you have questions about a medical condition or this instruction, always ask your healthcare professional. [place] [address] any warranty or liability for your use of this information. Learning About Low-Carbohydrate Diets for Weight Loss  What is a low-carbohydrate diet? Low-carb diets avoid foods that are high in carbohydrate. These high-carb foods include pasta, bread, rice, cereal, fruits, and starchy vegetables. Instead, these diets usually have you eat foods that are high in fat and protein. Many people lose weight quickly on a low-carb diet. But the early weight loss is water. People on this diet often gain the weight back after they start eating carbs again. Not all diet plans are safe or work well. A lot of the evidence shows that low-carb diets aren't healthy.  That's because these diets often don't include healthy foods like fruits and vegetables. Losing weight safely means balancing protein, fat, and carbs with every meal and snack. And low-carb diets don't always provide the vitamins, minerals, and fiber you need. If you have a serious medical condition, talk to your doctor before you try any diet. These conditions include kidney disease, heart disease, type 2 diabetes, high cholesterol, and high blood pressure. If you are pregnant, it may not be safe for your baby if you are on a low-carb diet. How can you lose weight safely? You might have heard that a diet plan helped another person lose weight. But that doesn't mean that it will work for you. It is very hard to stay on a diet that includes lots of big changes in your eating habits. If you want to get to a healthy weight and stay there, making healthy lifestyle changes will often work better than dieting. These steps can help. · Make a plan for change. Work with your doctor to create a plan that is right for you. · See a dietitian. He or she can show you how to make healthy changes in your eating habits. · Manage stress. If you have a lot of stress in your life, it can be hard to focus on making healthy changes to your daily habits. · Track your food and activity. You are likely to do better at losing weight if you keep track of what you eat and what you do. Follow-up care is a key part of your treatment and safety. Be sure to make and go to all appointments, and call your doctor if you are having problems. It's also a good idea to know your test results and keep a list of the medicines you take. Where can you learn more? Go to http://charity-antoni.info/. Enter A121 in the search box to learn more about \"Learning About Low-Carbohydrate Diets for Weight Loss. \"  Current as of: March 29, 2018  Content Version: 11.8  © 5054-7940 [employer].  Care instructions adapted under license by [email or web address] (which disclaims liability or warranty for this information). If you have questions about a medical condition or this instruction, always ask your healthcare professional. [address] any warranty or liability for your use of this information. Learning About Meal Planning for Diabetes  Why plan your meals? Meal planning can be a key part of managing diabetes. Planning meals and snacks with the right balance of carbohydrate, protein, and fat can help you keep your blood sugar at the target level you set with your doctor. You don't have to eat special foods. You can eat what your family eats, including sweets once in a while. But you do have to pay attention to how often you eat and how much you eat of certain foods. You may want to work with a dietitian or a certified diabetes educator. He or she can give you tips and meal ideas and can answer your questions about meal planning. This health professional can also help you reach a healthy weight if that is one of your goals. What plan is right for you? Your dietitian or diabetes educator may suggest that you start with the plate format or carbohydrate counting. The plate format  The plate format is a simple way to help you manage how you eat. You plan meals by learning how much space each food should take on a plate. Using the plate format helps you spread carbohydrate throughout the day. It can make it easier to keep your blood sugar level within your target range. It also helps you see if you're eating healthy portion sizes. To use the plate format, you put non-starchy vegetables on half your plate. Add meat or meat substitutes on one-quarter of the plate. Put a grain or starchy vegetable (such as brown rice or a potato) on the final quarter of the plate.  You can add a small piece of fruit and some low-fat or fat-free milk or yogurt, depending on your carbohydrate goal for each meal.  Here are some tips for using the plate format:  · Make sure that you are not using an oversized plate. A 9-inch plate is best. Many restaurants use larger plates. · Get used to using the plate format at home. Then you can use it when you eat out. · Write down your questions about using the plate format. Talk to your doctor, a dietitian, or a diabetes educator about your concerns. Carbohydrate counting  With carbohydrate counting, you plan meals based on the amount of carbohydrate in each food. Carbohydrate raises blood sugar higher and more quickly than any other nutrient. It is found in desserts, breads and cereals, and fruit. It's also found in starchy vegetables such as potatoes and corn, grains such as rice and pasta, and milk and yogurt. Spreading carbohydrate throughout the day helps keep your blood sugar levels within your target range. Your daily amount depends on several things, including your weight, how active you are, which diabetes medicines you take, and what your goals are for your blood sugar levels. A registered dietitian or diabetes educator can help you plan how much carbohydrate to include in each meal and snack. A guideline for your daily amount of carbohydrate is:  · 45 to 60 grams at each meal. That's about the same as 3 to 4 carbohydrate servings. · 15 to 20 grams at each snack. That's about the same as 1 carbohydrate serving. The Nutrition Facts label on packaged foods tells you how much carbohydrate is in a serving of the food. First, look at the serving size on the food label. Is that the amount you eat in a serving? All of the nutrition information on a food label is based on that serving size. So if you eat more or less than that, you'll need to adjust the other numbers. Total carbohydrate is the next thing you need to look for on the label. If you count carbohydrate servings, one serving of carbohydrate is 15 grams. For foods that don't come with labels, such as fresh fruits and vegetables, you'll need a guide that lists carbohydrate in these foods.  Ask your doctor, dietitian, or diabetes educator about books or other nutrition guides you can use. If you take insulin, you need to know how many grams of carbohydrate are in a meal. This lets you know how much rapid-acting insulin to take before you eat. If you use an insulin pump, you get a constant rate of insulin during the day. So the pump must be programmed at meals to give you extra insulin to cover the rise in blood sugar after meals. When you know how much carbohydrate you will eat, you can take the right amount of insulin. Or, if you always use the same amount of insulin, you need to make sure that you eat the same amount of carbohydrate at meals. If you need more help to understand carbohydrate counting and food labels, ask your doctor, dietitian, or diabetes educator. How do you get started with meal planning? Here are some tips to get started:  · Plan your meals a week at a time. Don't forget to include snacks too. · Use cookbooks or online recipes to plan several main meals. Plan some quick meals for busy nights. You also can double some recipes that freeze well. Then you can save half for other busy nights when you don't have time to cook. · Make sure you have the ingredients you need for your recipes. If you're running low on basic items, put these items on your shopping list too. · List foods that you use to make breakfasts, lunches, and snacks. List plenty of fruits and vegetables. · Post this list on the refrigerator. Add to it as you think of more things you need. · Take the list to the store to do your weekly shopping. Follow-up care is a key part of your treatment and safety. Be sure to make and go to all appointments, and call your doctor if you are having problems. It's also a good idea to know your test results and keep a list of the medicines you take. Where can you learn more? Go to http://charity-antoni.info/.   Inez Madrigal in the search box to learn more about \"Learning About Meal Planning for Diabetes. \"  Current as of: December 7, 2017  Content Version: 11.8  © 8212-2848 [employer]. Care instructions adapted under license by [employer] (which disclaims liability or warranty for this information). If you have questions about a medical condition or this instruction, always ask your healthcare professional. [place] [address] any warranty or liability for your use of this information.

## 2019-01-04 NOTE — PROGRESS NOTES
[address] [place] [address]     Chief Complaint   Patient presents with    Hypertension     Vitals:    01/04/19 1618   BP: 140/82   Pulse: 90   Resp: 17   Temp: 98.7 °F (37.1 °C)   TempSrc: Oral   SpO2: 99%   Weight: 221 lb (100.2 kg)   Height: 5' 4\" (1.626 m)   PainSc:   0 - No pain         HPI: She is here to follow-up on lab result, she has no acute complaints today, she has been watching her diet and increasing her activity and she has lost a few pounds since last visit. Also following up on hypertension after stopping atenolol and starting losartan, patient has tolerated losartan well blood pressure within normal limits today. Lab result discussed with patient in length her hemoglobin A1c has increased from 6.1 in 2017 it was 6.7 now, which are in the range of diabetes diagnosed. Patient lipid profile has much improved and now is within normal limit,  Past Medical History:   Diagnosis Date    Gynecological disorder     fibroids and low test.     Hypertension     Neck pain      Past Surgical History:   Procedure Laterality Date    HX GYN      HX HYSTERECTOMY      fibroids     HX HYSTERECTOMY      removal of ovaries     Social History     Tobacco Use    Smoking status: Never Smoker    Smokeless tobacco: Never Used   Substance Use Topics    Alcohol use: Yes     Comment: socailly        Family History   Problem Relation Age of Onset    Hypertension Mother     High Cholesterol Mother     No Known Problems Father        Review of Systems   Constitutional: Negative for chills, fever, malaise/fatigue and weight loss. HENT: Negative for congestion, ear discharge, ear pain, hearing loss and nosebleeds. Eyes: Negative for blurred vision, double vision and discharge. Respiratory: Negative for cough. Cardiovascular: Negative for chest pain, palpitations, claudication and leg swelling. Gastrointestinal: Negative for abdominal pain, constipation, diarrhea, nausea and vomiting.    Genitourinary: Negative for dysuria, frequency and urgency. Musculoskeletal: Negative for myalgias. Skin: Negative for itching and rash. Neurological: Negative for dizziness, tingling, sensory change, speech change, focal weakness, weakness and headaches. Psychiatric/Behavioral: Negative for depression and suicidal ideas. Physical Exam   Constitutional: She is oriented to person, place, and time. She appears well-developed and well-nourished. No distress. HENT:   Head: Normocephalic and atraumatic. Mouth/Throat: Oropharynx is clear and moist. No oropharyngeal exudate. Eyes: Conjunctivae are normal. Pupils are equal, round, and reactive to light. Right eye exhibits no discharge. Left eye exhibits no discharge. No scleral icterus. Neck: Normal range of motion. Neck supple. No thyromegaly present. Cardiovascular: Normal rate, regular rhythm and normal heart sounds. No murmur heard. Pulmonary/Chest: Effort normal and breath sounds normal. No respiratory distress. She has no wheezes. She has no rales. She exhibits no tenderness. Abdominal: Soft. She exhibits no distension. There is no tenderness. There is no rebound. Musculoskeletal: Normal range of motion. She exhibits edema. She exhibits no tenderness or deformity. Lymphadenopathy:     She has no cervical adenopathy. Neurological: She is alert and oriented to person, place, and time. No cranial nerve deficit. Coordination normal.   Skin: Skin is warm and dry. No rash noted. She is not diaphoretic. No erythema. No pallor. Psychiatric: She has a normal mood and affect. Her behavior is normal. Judgment and thought content normal.   Nursing note and vitals reviewed. Assessment and plan     Plan of care has been discussed with the patient, he agrees to the plan and verbalized understanding. All his questions were answered  More than 50% of the time spent in this visit was counseling the patient about  illness and treatment options         1. Severe obesity ([facility] [address]  She has lost a few pounds and she is already on lifestyle modification increase activity    2. Essential hypertension  Controlled continue losartan 50 mg daily    3. Mixed hyperlipidemia  Continue lifestyle modification  4. Controlled type 2 diabetes mellitus with complication, without long-term current use of insulin (HCC)  Options of treatment has been discussed with patient and she agrees to start metformin, 500 mg once a day in the first week if well tolerated to increase it to twice daily and follow-up in 3 months. Side effects of medication has been discussed with patient in length  - metFORMIN (GLUCOPHAGE) 500 mg tablet; Take 1 Tab by mouth two (2) times daily (with meals). Dispense: 60 Tab; Refill: 2    Current Outpatient Medications   Medication Sig Dispense Refill    metFORMIN (GLUCOPHAGE) 500 mg tablet Take 1 Tab by mouth two (2) times daily (with meals). 60 Tab 2    losartan (COZAAR) 50 mg tablet Take 1 Tab by mouth daily. 30 Tab 2    estradiol (ESTRACE) 0.5 mg tablet Take 1 Tab by mouth daily. 90 Tab 2    atenolol (TENORMIN) 100 mg tablet TAKE ONE TABLET BY MOUTH TWICE DAILY 60 Tab 0    ibuprofen (MOTRIN) 800 mg tablet Take 1 Tab by mouth every eight (8) hours as needed for Pain.  90 Tab 2       Patient Active Problem List    Diagnosis Date Noted    [facility] ([facility] 12/14/2018    Mixed hyperlipidemia 12/14/2018    HTN (hypertension) 06/01/2016     Results for orders placed or performed in visit on 12/14/18   LIPID PANEL   Result Value Ref Range    Cholesterol, total 176 100 - 199 mg/dL    Triglyceride 83 0 - 149 mg/dL    HDL Cholesterol 52 >39 mg/dL    VLDL, calculated 17 5 - 40 mg/dL    LDL, calculated 107 (H) 0 - 99 mg/dL   METABOLIC PANEL, COMPREHENSIVE   Result Value Ref Range    Glucose 116 (H) 65 - 99 mg/dL    BUN 9 6 - 24 mg/dL    Creatinine 0.83 0.57 - 1.00 mg/dL    GFR est non-AA 80 >59 mL/min/1.73    GFR est AA 92 >59 mL/min/1.73    BUN/Creatinine ratio 11 9 - 23    Sodium 144 134 - 144 mmol/L    Potassium 4.0 3.5 - 5.2 mmol/L    Chloride 105 96 - 106 mmol/L    CO2 23 20 - 29 mmol/L    Calcium 9.0 8.7 - 10.2 mg/dL    Protein, total 7.0 6.0 - 8.5 g/dL    Albumin 4.4 3.5 - 5.5 g/dL    GLOBULIN, TOTAL 2.6 1.5 - 4.5 g/dL    A-G Ratio 1.7 1.2 - 2.2    Bilirubin, total 0.3 0.0 - 1.2 mg/dL    Alk. phosphatase 58 39 - 117 IU/L    AST (SGOT) 22 0 - 40 IU/L    ALT (SGPT) 19 0 - 32 IU/L   HEMOGLOBIN A1C W/O EAG   Result Value Ref Range    Hemoglobin A1c 6.7 (H) 4.8 - 5.6 %     Orders Only on 12/14/2018   Component Date Value Ref Range Status    Cholesterol, total 12/17/2018 176  100 - 199 mg/dL Final    Triglyceride 12/17/2018 83  0 - 149 mg/dL Final    HDL Cholesterol 12/17/2018 52  >39 mg/dL Final    VLDL, calculated 12/17/2018 17  5 - 40 mg/dL Final    LDL, calculated 12/17/2018 107* 0 - 99 mg/dL Final    Glucose 12/17/2018 116* 65 - 99 mg/dL Final    BUN 12/17/2018 9  6 - 24 mg/dL Final    Creatinine 12/17/2018 0.83  0.57 - 1.00 mg/dL Final    GFR est non-AA 12/17/2018 80  >59 mL/min/1.73 Final    GFR est AA 12/17/2018 92  >59 mL/min/1.73 Final    BUN/Creatinine ratio 12/17/2018 11  9 - 23 Final    Sodium 12/17/2018 144  134 - 144 mmol/L Final    Potassium 12/17/2018 4.0  3.5 - 5.2 mmol/L Final    Chloride 12/17/2018 105  96 - 106 mmol/L Final    CO2 12/17/2018 23  20 - 29 mmol/L Final    Calcium 12/17/2018 9.0  8.7 - 10.2 mg/dL Final    Protein, total 12/17/2018 7.0  6.0 - 8.5 g/dL Final    Albumin 12/17/2018 4.4  3.5 - 5.5 g/dL Final    GLOBULIN, TOTAL 12/17/2018 2.6  1.5 - 4.5 g/dL Final    A-G Ratio 12/17/2018 1.7  1.2 - 2.2 Final    Bilirubin, total 12/17/2018 0.3  0.0 - 1.2 mg/dL Final    Alk.  phosphatase 12/17/2018 58  39 - 117 IU/L Final    AST (SGOT) 12/17/2018 22  0 - 40 IU/L Final    ALT (SGPT) 12/17/2018 19  0 - 32 IU/L Final    Hemoglobin A1c 12/17/2018 6.7* 4.8 - 5.6 % Final    Comment:          Prediabetes: 5.7 - 6.4 Diabetes: >6.4           Glycemic control for adults with diabetes: <7.0     Office Visit on 12/14/2018   Component Date Value Ref Range Status    INTERPRETATION 12/17/2018 Note   Final    Supplemental report is available.  PDF Image [id number] Not applicable   Final          Follow-up Disposition:  Return in about 3 months (around 4/4/2019).

## 2019-01-04 NOTE — PROGRESS NOTES
Дмитрий Tanner is a 52 y.o. female presents to office for htn    Medication list has been reviewed with Дмитрий Tanner and updated as of today's date     Health Maintenance items with a due date reviewed with patient:  Health Maintenance Due   Topic Date Due    Hepatitis C Screening  1965    Shingrix Vaccine Age 50> (1 of 2) 05/14/2013

## 2019-03-17 DIAGNOSIS — I10 ESSENTIAL HYPERTENSION: ICD-10-CM

## 2019-03-20 RX ORDER — LOSARTAN POTASSIUM 50 MG/1
TABLET ORAL
Qty: 30 TAB | Refills: 2 | Status: SHIPPED | OUTPATIENT
Start: 2019-03-20 | End: 2019-04-05

## 2019-04-05 ENCOUNTER — OFFICE VISIT (OUTPATIENT)
Dept: FAMILY MEDICINE CLINIC | Age: 54
End: 2019-04-05

## 2019-04-05 ENCOUNTER — HOSPITAL ENCOUNTER (OUTPATIENT)
Dept: LAB | Age: 54
Discharge: HOME | End: 2019-04-05
Payer: COMMERCIAL

## 2019-04-05 VITALS
TEMPERATURE: 97.7 F | HEART RATE: 84 BPM | HEIGHT: 64 IN | OXYGEN SATURATION: 98 % | SYSTOLIC BLOOD PRESSURE: 151 MMHG | WEIGHT: 219 LBS | BODY MASS INDEX: 37.39 KG/M2 | RESPIRATION RATE: 17 BRPM | DIASTOLIC BLOOD PRESSURE: 89 MMHG

## 2019-04-05 DIAGNOSIS — Z00.00 ANNUAL PHYSICAL EXAM: ICD-10-CM

## 2019-04-05 DIAGNOSIS — F41.1 GAD (GENERALIZED ANXIETY DISORDER): ICD-10-CM

## 2019-04-05 DIAGNOSIS — Z00.00 ANNUAL PHYSICAL EXAM: Primary | ICD-10-CM

## 2019-04-05 DIAGNOSIS — Z11.59 NEED FOR HEPATITIS C SCREENING TEST: ICD-10-CM

## 2019-04-05 DIAGNOSIS — E78.2 MIXED HYPERLIPIDEMIA: ICD-10-CM

## 2019-04-05 DIAGNOSIS — I10 ESSENTIAL HYPERTENSION: ICD-10-CM

## 2019-04-05 DIAGNOSIS — E11.8 TYPE 2 DIABETES MELLITUS WITH COMPLICATION, WITHOUT LONG-TERM CURRENT USE OF INSULIN (HCC): ICD-10-CM

## 2019-04-05 DIAGNOSIS — E66.01 SEVERE OBESITY (HCC): ICD-10-CM

## 2019-04-05 LAB
ALBUMIN SERPL-MCNC: 3.9 G/DL (ref 3.4–5)
ALBUMIN/GLOB SERPL: 1.1 {RATIO} (ref 0.8–1.7)
ALP SERPL-CCNC: 67 U/L (ref 45–117)
ALT SERPL-CCNC: 25 U/L (ref 13–56)
AMORPH CRY URNS QL MICRO: ABNORMAL
ANION GAP SERPL CALC-SCNC: 8 MMOL/L (ref 3–18)
APPEARANCE UR: CLEAR
AST SERPL-CCNC: 14 U/L (ref 15–37)
BACTERIA URNS QL MICRO: NEGATIVE /HPF
BASOPHILS # BLD: 0 K/UL (ref 0–0.1)
BASOPHILS NFR BLD: 1 % (ref 0–2)
BILIRUB SERPL-MCNC: 0.3 MG/DL (ref 0.2–1)
BILIRUB UR QL: NEGATIVE
BUN SERPL-MCNC: 15 MG/DL (ref 7–18)
BUN/CREAT SERPL: 17 (ref 12–20)
CALCIUM SERPL-MCNC: 8.8 MG/DL (ref 8.5–10.1)
CHLORIDE SERPL-SCNC: 105 MMOL/L (ref 100–108)
CHOLEST SERPL-MCNC: 193 MG/DL
CO2 SERPL-SCNC: 28 MMOL/L (ref 21–32)
COLOR UR: YELLOW
CREAT SERPL-MCNC: 0.87 MG/DL (ref 0.6–1.3)
DIFFERENTIAL METHOD BLD: ABNORMAL
EOSINOPHIL # BLD: 0.1 K/UL (ref 0–0.4)
EOSINOPHIL NFR BLD: 2 % (ref 0–5)
EPITH CASTS URNS QL MICRO: ABNORMAL /LPF (ref 0–5)
ERYTHROCYTE [DISTWIDTH] IN BLOOD BY AUTOMATED COUNT: 13.4 % (ref 11.6–14.5)
GLOBULIN SER CALC-MCNC: 3.6 G/DL (ref 2–4)
GLUCOSE SERPL-MCNC: 103 MG/DL (ref 74–99)
GLUCOSE UR STRIP.AUTO-MCNC: NEGATIVE MG/DL
HBA1C MFR BLD: 6.6 % (ref 4.2–5.6)
HCT VFR BLD AUTO: 38 % (ref 35–45)
HDLC SERPL-MCNC: 58 MG/DL (ref 40–60)
HDLC SERPL: 3.3 {RATIO} (ref 0–5)
HGB BLD-MCNC: 11.6 G/DL (ref 12–16)
HGB UR QL STRIP: NEGATIVE
KETONES UR QL STRIP.AUTO: NEGATIVE MG/DL
LDLC SERPL CALC-MCNC: 113.6 MG/DL (ref 0–100)
LEUKOCYTE ESTERASE UR QL STRIP.AUTO: NEGATIVE
LIPID PROFILE,FLP: ABNORMAL
LYMPHOCYTES # BLD: 2 K/UL (ref 0.9–3.6)
LYMPHOCYTES NFR BLD: 30 % (ref 21–52)
MCH RBC QN AUTO: 27.5 PG (ref 24–34)
MCHC RBC AUTO-ENTMCNC: 30.5 G/DL (ref 31–37)
MCV RBC AUTO: 90 FL (ref 74–97)
MONOCYTES # BLD: 0.4 K/UL (ref 0.05–1.2)
MONOCYTES NFR BLD: 6 % (ref 3–10)
NEUTS SEG # BLD: 4 K/UL (ref 1.8–8)
NEUTS SEG NFR BLD: 61 % (ref 40–73)
NITRITE UR QL STRIP.AUTO: NEGATIVE
PH UR STRIP: 6 [PH] (ref 5–8)
PLATELET # BLD AUTO: 399 K/UL (ref 135–420)
PMV BLD AUTO: 9.8 FL (ref 9.2–11.8)
POTASSIUM SERPL-SCNC: 4.2 MMOL/L (ref 3.5–5.5)
PROT SERPL-MCNC: 7.5 G/DL (ref 6.4–8.2)
PROT UR STRIP-MCNC: NEGATIVE MG/DL
RBC # BLD AUTO: 4.22 M/UL (ref 4.2–5.3)
RBC #/AREA URNS HPF: ABNORMAL /HPF (ref 0–5)
SODIUM SERPL-SCNC: 141 MMOL/L (ref 136–145)
SP GR UR REFRACTOMETRY: 1.02 (ref 1–1.03)
TRIGL SERPL-MCNC: 107 MG/DL (ref ?–150)
UROBILINOGEN UR QL STRIP.AUTO: 0.2 EU/DL (ref 0.2–1)
VLDLC SERPL CALC-MCNC: 21.4 MG/DL
WBC # BLD AUTO: 6.6 K/UL (ref 4.6–13.2)
WBC URNS QL MICRO: NEGATIVE /HPF (ref 0–4)

## 2019-04-05 PROCEDURE — 36415 COLL VENOUS BLD VENIPUNCTURE: CPT

## 2019-04-05 PROCEDURE — 81001 URINALYSIS AUTO W/SCOPE: CPT

## 2019-04-05 PROCEDURE — 83036 HEMOGLOBIN GLYCOSYLATED A1C: CPT

## 2019-04-05 PROCEDURE — 86803 HEPATITIS C AB TEST: CPT

## 2019-04-05 PROCEDURE — 80061 LIPID PANEL: CPT

## 2019-04-05 PROCEDURE — 80053 COMPREHEN METABOLIC PANEL: CPT

## 2019-04-05 PROCEDURE — 85025 COMPLETE CBC W/AUTO DIFF WBC: CPT

## 2019-04-05 RX ORDER — AMLODIPINE AND BENAZEPRIL HYDROCHLORIDE 5; 20 MG/1; MG/1
1 CAPSULE ORAL DAILY
Qty: 30 CAP | Refills: 2 | Status: SHIPPED | OUTPATIENT
Start: 2019-04-05 | End: 2019-07-02

## 2019-04-05 RX ORDER — CITALOPRAM 10 MG/1
10 TABLET ORAL DAILY
Qty: 30 TAB | Refills: 2 | Status: SHIPPED | OUTPATIENT
Start: 2019-04-05 | End: 2019-12-16

## 2019-04-05 NOTE — PROGRESS NOTES
[address] [place] [address]     Chief Complaint   Patient presents with    Physical     Vitals:    04/05/19 0810   BP: 151/89   Pulse: 84   Resp: 17   Temp: 97.7 °F (36.5 °C)   TempSrc: Oral   SpO2: 98%   Weight: 219 lb (99.3 kg)   Height: 5' 4\" (1.626 m)   PainSc:   0 - No pain         HPI: Patient is here for her annual physical examination. She has been on metformin 500 mg twice daily last hemoglobin A1c was 6.7 and hemoglobin C will be checked today. Patient also has been having general anxiety symptoms, and she also has been trying to get off hormone replacement, I recommended for her to start on SSRIs to help with her anxiety and also that will be helpful for heart had flashes symptoms. Past Medical History:   Diagnosis Date    Gynecological disorder     fibroids and low test.     Hypertension     Neck pain      Past Surgical History:   Procedure Laterality Date    HX GYN      HX HYSTERECTOMY      fibroids     HX HYSTERECTOMY      removal of ovaries     Social History     Tobacco Use    Smoking status: Never Smoker    Smokeless tobacco: Never Used   Substance Use Topics    Alcohol use: Yes     Comment: socailly        Family History   Problem Relation Age of Onset    Hypertension Mother     High Cholesterol Mother     No Known Problems Father        Review of Systems   Constitutional: Negative for chills, fever, malaise/fatigue and weight loss. HENT: Negative for congestion, ear discharge, ear pain, hearing loss, nosebleeds, sinus pain and sore throat. Eyes: Negative for blurred vision, double vision and discharge. Respiratory: Negative for cough and hemoptysis. Cardiovascular: Negative for chest pain, palpitations, claudication and leg swelling. Gastrointestinal: Negative for abdominal pain, constipation, diarrhea, nausea and vomiting. Genitourinary: Negative for dysuria, frequency and urgency. Musculoskeletal: Negative for myalgias. Skin: Negative for itching and rash. Neurological: Negative for dizziness, tingling, sensory change, speech change, focal weakness, weakness and headaches. Psychiatric/Behavioral: Negative for depression, substance abuse and suicidal ideas. The patient is nervous/anxious. The patient does not have insomnia. Physical Exam   Constitutional: She is oriented to person, place, and time. She appears well-developed and well-nourished. No distress. HENT:   Head: Normocephalic and atraumatic. Mouth/Throat: Oropharynx is clear and moist. No oropharyngeal exudate. Eyes: Pupils are equal, round, and reactive to light. Conjunctivae are normal. Right eye exhibits no discharge. Left eye exhibits no discharge. No scleral icterus. Neck: Normal range of motion. Neck supple. No thyromegaly present. Cardiovascular: Normal rate, regular rhythm and normal heart sounds. No murmur heard. Pulmonary/Chest: Effort normal and breath sounds normal. No respiratory distress. She has no wheezes. She has no rales. She exhibits no tenderness. Abdominal: Soft. She exhibits no distension. There is no tenderness. There is no rebound. Musculoskeletal: Normal range of motion. She exhibits no edema, tenderness or deformity. Lymphadenopathy:     She has no cervical adenopathy. Neurological: She is alert and oriented to person, place, and time. No cranial nerve deficit. Coordination normal.   Skin: Skin is warm and dry. No rash noted. She is not diaphoretic. No erythema. No pallor. Psychiatric: She has a normal mood and affect. Her behavior is normal. Judgment and thought content normal.   Nursing note and vitals reviewed. Assessment and plan     Plan of care has been discussed with the patient, he agrees to the plan and verbalized understanding. All his questions were answered  More than 50% of the time spent in this visit was counseling the patient about  illness and treatment options         1.  Annual physical exam    - CBC WITH AUTOMATED DIFF; Future  - METABOLIC PANEL, COMPREHENSIVE; Future  - LIPID PANEL; Future  - URINALYSIS W/MICROSCOPIC; Future    2. Essential hypertension  Well-controlled on current medications  - amLODIPine-benazepril (LOTREL) 5-20 mg per capsule; Take 1 Cap by mouth daily. Dispense: 30 Cap; Refill: 2  - REFERRAL TO DIETITIAN    3. Severe obesity ([address]  Not lost much weight should be referred for dietitian   - REFERRAL TO DIETITIAN    4. Mixed hyperlipidemia  She is not on any cholesterol medication lipid profile will be evaluated today  5. Need for hepatitis C screening test    - HEPATITIS C AB; Future    6. Type 2 diabetes mellitus with complication, without long-term current use of insulin (HCC)    - HEMOGLOBIN A1C W/O EAG; Future  - REFERRAL TO DIETITIAN    7. DAVID (generalized anxiety disorder)    Patient was advised to continue medication for 4-6 weeks and then start gradually cutting back on her hormone replacement    - citalopram (CELEXA) 10 mg tablet; Take 1 Tab by mouth daily. Dispense: 30 Tab; Refill: 2    Current Outpatient Medications   Medication Sig Dispense Refill    amLODIPine-benazepril (LOTREL) 5-20 mg per capsule Take 1 Cap by mouth daily. 30 Cap 2    citalopram (CELEXA) 10 mg tablet Take 1 Tab by mouth daily. 30 Tab 2    metFORMIN (GLUCOPHAGE) 500 mg tablet Take 1 Tab by mouth two (2) times daily (with meals). 60 Tab 2    estradiol (ESTRACE) 0.5 mg tablet Take 1 Tab by mouth daily.  90 Tab 2       Patient Active Problem List    Diagnosis Date Noted    Severe obesity ([facility] [address] 12/14/2018    Mixed hyperlipidemia 12/14/2018    HTN (hypertension) 06/01/2016     Results for orders placed or performed in visit on 12/14/18   LIPID PANEL   Result Value Ref Range    Cholesterol, total 176 100 - 199 mg/dL    Triglyceride 83 0 - 149 mg/dL    HDL Cholesterol 52 >39 mg/dL    VLDL, calculated 17 5 - 40 mg/dL    LDL, calculated 107 (H) 0 - 99 mg/dL   METABOLIC PANEL, COMPREHENSIVE   Result Value Ref Range    Glucose 116 (H) 65 - 99 mg/dL    BUN 9 6 - 24 mg/dL    Creatinine 0.83 0.57 - 1.00 mg/dL    GFR est non-AA 80 >59 mL/min/1.73    GFR est AA 92 >59 mL/min/1.73    BUN/Creatinine ratio 11 9 - 23    Sodium 144 134 - 144 mmol/L    Potassium 4.0 3.5 - 5.2 mmol/L    Chloride 105 96 - 106 mmol/L    CO2 23 20 - 29 mmol/L    Calcium 9.0 8.7 - 10.2 mg/dL    Protein, total 7.0 6.0 - 8.5 g/dL    Albumin 4.4 3.5 - 5.5 g/dL    GLOBULIN, TOTAL 2.6 1.5 - 4.5 g/dL    A-G Ratio 1.7 1.2 - 2.2    Bilirubin, total 0.3 0.0 - 1.2 mg/dL    Alk. phosphatase 58 39 - 117 IU/L    AST (SGOT) 22 0 - 40 IU/L    ALT (SGPT) 19 0 - 32 IU/L   HEMOGLOBIN A1C W/O EAG   Result Value Ref Range    Hemoglobin A1c 6.7 (H) 4.8 - 5.6 %     No visits with results within 3 Month(s) from this visit.    Latest known visit with results is:   Orders Only on 12/14/2018   Component Date Value Ref Range Status    Cholesterol, total 12/17/2018 176  100 - 199 mg/dL Final    Triglyceride 12/17/2018 83  0 - 149 mg/dL Final    HDL Cholesterol 12/17/2018 52  >39 mg/dL Final    VLDL, calculated 12/17/2018 17  5 - 40 mg/dL Final    LDL, calculated 12/17/2018 107* 0 - 99 mg/dL Final    Glucose 12/17/2018 116* 65 - 99 mg/dL Final    BUN 12/17/2018 9  6 - 24 mg/dL Final    Creatinine 12/17/2018 0.83  0.57 - 1.00 mg/dL Final    GFR est non-AA 12/17/2018 80  >59 mL/min/1.73 Final    GFR est AA 12/17/2018 92  >59 mL/min/1.73 Final    BUN/Creatinine ratio 12/17/2018 11  9 - 23 Final    Sodium 12/17/2018 144  134 - 144 mmol/L Final    Potassium 12/17/2018 4.0  3.5 - 5.2 mmol/L Final    Chloride 12/17/2018 105  96 - 106 mmol/L Final    CO2 12/17/2018 23  20 - 29 mmol/L Final    Calcium 12/17/2018 9.0  8.7 - 10.2 mg/dL Final    Protein, total 12/17/2018 7.0  6.0 - 8.5 g/dL Final    Albumin 12/17/2018 4.4  3.5 - 5.5 g/dL Final    GLOBULIN, TOTAL 12/17/2018 2.6  1.5 - 4.5 g/dL Final    A-G Ratio 12/17/2018 1.7  1.2 - 2.2 Final    Bilirubin, total 12/17/2018 0.3  0.0 - 1.2 mg/dL Final    Alk. phosphatase 12/17/2018 58  39 - 117 IU/L Final    AST (SGOT) 12/17/2018 22  0 - 40 IU/L Final    ALT (SGPT) 12/17/2018 19  0 - 32 IU/L Final    Hemoglobin A1c 12/17/2018 6.7* 4.8 - 5.6 % Final    Comment:          Prediabetes: 5.7 - 6.4           Diabetes: >6.4           Glycemic control for adults with diabetes: <7.0            Follow-up and Dispositions    · Return in about 2 months (around 6/5/2019) for  as per result.

## 2019-04-05 NOTE — PATIENT INSTRUCTIONS
Counting Carbohydrates: Care Instructions  Your Care Instructions    You don't have to eat special foods when you have diabetes. You just have to be careful to eat healthy foods. Carbohydrates (carbs) raise blood sugar higher and quicker than any other nutrient. Carbs are found in desserts, breads and cereals, and fruit. They're also in starchy vegetables. These include potatoes, corn, and grains such as rice and pasta. Carbs are also in milk and yogurt. The more carbs you eat at one time, the higher your blood sugar will rise. Spreading carbs all through the day helps keep your blood sugar levels within your target range. Counting carbs is one of the best ways to keep your blood sugar under control. If you use insulin, counting carbs helps you match the right amount of insulin to the number of grams of carbs in a meal. Then you can change your diet and insulin dose as needed. Testing your blood sugar several times a day can help you learn how carbs affect your blood sugar. A registered dietitian or certified diabetes educator can help you plan meals and snacks. Follow-up care is a key part of your treatment and safety. Be sure to make and go to all appointments, and call your doctor if you are having problems. It's also a good idea to know your test results and keep a list of the medicines you take. How can you care for yourself at home? Know your daily amount of carbohydrates  Your daily amount depends on several things, such as your weight, how active you are, which diabetes medicines you take, and what your goals are for your blood sugar levels. A registered dietitian or certified diabetes educator can help you plan how many carbs to include in each meal and snack. For most adults, a guideline for the daily amount of carbs is:  · 45 to 60 grams at each meal. That's about the same as 3 to 4 carbohydrate servings. · 15 to 20 grams at each snack. That's about the same as 1 carbohydrate serving.   Count carbs  Counting carbs lets you know how much rapid-acting insulin to take before you eat. If you use an insulin pump, you get a constant rate of insulin during the day. So the pump must be programmed at meals. This gives you extra insulin to cover the rise in blood sugar after meals. If you take insulin:  · Learn your own insulin-to-carb ratio. You and your diabetes health professional will figure out the ratio. You can do this by testing your blood sugar after meals. For example, you may need a certain amount of insulin for every 15 grams of carbs. · Add up the carb grams in a meal. Then you can figure out how many units of insulin to take based on your insulin-to-carb ratio. · Exercise lowers blood sugar. You can use less insulin than you would if you were not doing exercise. Keep in mind that timing matters. If you exercise within 1 hour after a meal, your body may need less insulin for that meal than it would if you exercised 3 hours after the meal. Test your blood sugar to find out how exercise affects your need for insulin. If you do or don't take insulin:  · Look at labels on packaged foods. This can tell you how many carbs are in a serving. You can also use guides from the American Diabetes Association. · Be aware of portions, or serving sizes. If a package has two servings and you eat the whole package, you need to double the number of grams of carbohydrate listed for one serving. · Protein, fat, and fiber do not raise blood sugar as much as carbs do. If you eat a lot of these nutrients in a meal, your blood sugar will rise more slowly than it would otherwise. Eat from all food groups  · Eat at least three meals a day. · Plan meals to include food from all the food groups. The food groups include grains, fruits, dairy, proteins, and vegetables. · Talk to your dietitian or diabetes educator about ways to add limited amounts of sweets into your meal plan. · If you drink alcohol, talk to your doctor. It may not be recommended when you are taking certain diabetes medicines. Where can you learn more? Go to http://charity-antoni.info/. Enter W682 in the search box to learn more about \"Counting Carbohydrates: Care Instructions. \"  Current as of: July 25, 2018  Content Version: 11.9  © 7088-6608 [employer]. Care instructions adapted under license by [employer] (which disclaims liability or warranty for this information). If you have questions about a medical condition or this instruction, always ask your healthcare professional. [address] any warranty or liability for your use of this information. Learning About Meal Planning for Diabetes  Why plan your meals? Meal planning can be a key part of managing diabetes. Planning meals and snacks with the right balance of carbohydrate, protein, and fat can help you keep your blood sugar at the target level you set with your doctor. You don't have to eat special foods. You can eat what your family eats, including sweets once in a while. But you do have to pay attention to how often you eat and how much you eat of certain foods. You may want to work with a dietitian or a certified diabetes educator. He or she can give you tips and meal ideas and can answer your questions about meal planning. This health professional can also help you reach a healthy weight if that is one of your goals. What plan is right for you? Your dietitian or diabetes educator may suggest that you start with the plate format or carbohydrate counting. The plate format  The plate format is a simple way to help you manage how you eat. You plan meals by learning how much space each food should take on a plate. Using the plate format helps you spread carbohydrate throughout the day. It can make it easier to keep your blood sugar level within your target range. It also helps you see if you're eating healthy portion sizes.   To use the plate format, you put non-starchy vegetables on half your plate. Add meat or meat substitutes on one-quarter of the plate. Put a grain or starchy vegetable (such as brown rice or a potato) on the final quarter of the plate. You can add a small piece of fruit and some low-fat or fat-free milk or yogurt, depending on your carbohydrate goal for each meal.  Here are some tips for using the plate format:  · Make sure that you are not using an oversized plate. A 9-inch plate is best. Many restaurants use larger plates. · Get used to using the plate format at home. Then you can use it when you eat out. · Write down your questions about using the plate format. Talk to your doctor, a dietitian, or a diabetes educator about your concerns. Carbohydrate counting  With carbohydrate counting, you plan meals based on the amount of carbohydrate in each food. Carbohydrate raises blood sugar higher and more quickly than any other nutrient. It is found in desserts, breads and cereals, and fruit. It's also found in starchy vegetables such as potatoes and corn, grains such as rice and pasta, and milk and yogurt. Spreading carbohydrate throughout the day helps keep your blood sugar levels within your target range. Your daily amount depends on several things, including your weight, how active you are, which diabetes medicines you take, and what your goals are for your blood sugar levels. A registered dietitian or diabetes educator can help you plan how much carbohydrate to include in each meal and snack. A guideline for your daily amount of carbohydrate is:  · 45 to 60 grams at each meal. That's about the same as 3 to 4 carbohydrate servings. · 15 to 20 grams at each snack. That's about the same as 1 carbohydrate serving. The Nutrition Facts label on packaged foods tells you how much carbohydrate is in a serving of the food. First, look at the serving size on the food label. Is that the amount you eat in a serving?  All of the nutrition information on a food label is based on that serving size. So if you eat more or less than that, you'll need to adjust the other numbers. Total carbohydrate is the next thing you need to look for on the label. If you count carbohydrate servings, one serving of carbohydrate is 15 grams. For foods that don't come with labels, such as fresh fruits and vegetables, you'll need a guide that lists carbohydrate in these foods. Ask your doctor, dietitian, or diabetes educator about books or other nutrition guides you can use. If you take insulin, you need to know how many grams of carbohydrate are in a meal. This lets you know how much rapid-acting insulin to take before you eat. If you use an insulin pump, you get a constant rate of insulin during the day. So the pump must be programmed at meals to give you extra insulin to cover the rise in blood sugar after meals. When you know how much carbohydrate you will eat, you can take the right amount of insulin. Or, if you always use the same amount of insulin, you need to make sure that you eat the same amount of carbohydrate at meals. If you need more help to understand carbohydrate counting and food labels, ask your doctor, dietitian, or diabetes educator. How do you get started with meal planning? Here are some tips to get started:  · Plan your meals a week at a time. Don't forget to include snacks too. · Use cookbooks or online recipes to plan several main meals. Plan some quick meals for busy nights. You also can double some recipes that freeze well. Then you can save half for other busy nights when you don't have time to cook. · Make sure you have the ingredients you need for your recipes. If you're running low on basic items, put these items on your shopping list too. · List foods that you use to make breakfasts, lunches, and snacks. List plenty of fruits and vegetables. · Post this list on the refrigerator.  Add to it as you think of more things you need. · Take the list to the store to do your weekly shopping. Follow-up care is a key part of your treatment and safety. Be sure to make and go to all appointments, and call your doctor if you are having problems. It's also a good idea to know your test results and keep a list of the medicines you take. Where can you learn more? Go to http://charity-antoni.info/. Jeremy Gee in the search box to learn more about \"Learning About Meal Planning for Diabetes. \"  Current as of: July 25, 2018  Content Version: 11.9  © 0010-7300 [employer]. Care instructions adapted under license by [employer] (which disclaims liability or warranty for this information). If you have questions about a medical condition or this instruction, always ask your healthcare professional. [address] any warranty or liability for your use of this information. Learning About Diabetes Food Guidelines  Your Care Instructions    Meal planning is important to manage diabetes. It helps keep your blood sugar at a target level (which you set with your doctor). You don't have to eat special foods. You can eat what your family eats, including sweets once in a while. But you do have to pay attention to how often you eat and how much you eat of certain foods. You may want to work with a dietitian or a certified diabetes educator (CDE) to help you plan meals and snacks. A dietitian or CDE can also help you lose weight if that is one of your goals. What should you know about eating carbs? Managing the amount of carbohydrate (carbs) you eat is an important part of healthy meals when you have diabetes. Carbohydrate is found in many foods. · Learn which foods have carbs. And learn the amounts of carbs in different foods. ? Bread, cereal, pasta, and rice have about 15 grams of carbs in a serving.  A serving is 1 slice of bread (1 ounce), ½ cup of cooked cereal, or 1/3 cup of cooked pasta or rice. ? Fruits have 15 grams of carbs in a serving. A serving is 1 small fresh fruit, such as an apple or orange; ½ of a banana; ½ cup of cooked or canned fruit; ½ cup of fruit juice; 1 cup of melon or raspberries; or 2 tablespoons of dried fruit. ? Milk and no-sugar-added yogurt have 15 grams of carbs in a serving. A serving is 1 cup of milk or 2/3 cup of no-sugar-added yogurt. ? Starchy vegetables have 15 grams of carbs in a serving. A serving is ½ cup of mashed potatoes or sweet potato; 1 cup winter squash; ½ of a small baked potato; ½ cup of cooked beans; or ½ cup cooked corn or green peas. · Learn how much carbs to eat each day and at each meal. A dietitian or CDE can teach you how to keep track of the amount of carbs you eat. This is called carbohydrate counting. · If you are not sure how to count carbohydrate grams, use the Plate Method to plan meals. It is a good, quick way to make sure that you have a balanced meal. It also helps you spread carbs throughout the day. ? Divide your plate by types of foods. Put non-starchy vegetables on half the plate, meat or other protein food on one-quarter of the plate, and a grain or starchy vegetable in the final quarter of the plate. To this you can add a small piece of fruit and 1 cup of milk or yogurt, depending on how many carbs you are supposed to eat at a meal.  · Try to eat about the same amount of carbs at each meal. Do not \"save up\" your daily allowance of carbs to eat at one meal.  · Proteins have very little or no carbs per serving. Examples of proteins are beef, chicken, turkey, fish, eggs, tofu, cheese, cottage cheese, and peanut butter. A serving size of meat is 3 ounces, which is about the size of a deck of cards. Examples of meat substitute serving sizes (equal to 1 ounce of meat) are 1/4 cup of cottage cheese, 1 egg, 1 tablespoon of peanut butter, and ½ cup of tofu. How can you eat out and still eat healthy?   · Learn to estimate the serving sizes of foods that have carbohydrate. If you measure food at home, it will be easier to estimate the amount in a serving of restaurant food. · If the meal you order has too much carbohydrate (such as potatoes, corn, or baked beans), ask to have a low-carbohydrate food instead. Ask for a salad or green vegetables. · If you use insulin, check your blood sugar before and after eating out to help you plan how much to eat in the future. · If you eat more carbohydrate at a meal than you had planned, take a walk or do other exercise. This will help lower your blood sugar. What else should you know? · Limit saturated fat, such as the fat from meat and dairy products. This is a healthy choice because people who have diabetes are at higher risk of heart disease. So choose lean cuts of meat and nonfat or low-fat dairy products. Use olive or canola oil instead of butter or shortening when cooking. · Don't skip meals. Your blood sugar may drop too low if you skip meals and take insulin or certain medicines for diabetes. · Check with your doctor before you drink alcohol. Alcohol can cause your blood sugar to drop too low. Alcohol can also cause a bad reaction if you take certain diabetes medicines. Follow-up care is a key part of your treatment and safety. Be sure to make and go to all appointments, and call your doctor if you are having problems. It's also a good idea to know your test results and keep a list of the medicines you take. Where can you learn more? Go to http://charity-antoni.info/. Enter P932 in the search box to learn more about \"Learning About Diabetes Food Guidelines. \"  Current as of: July 25, 2018  Content Version: 11.9  © 7680-6243 [employer]. Care instructions adapted under license by [employer] (which disclaims liability or warranty for this information).  If you have questions about a medical condition or this instruction, always ask your healthcare professional. [address] any warranty or liability for your use of this information.

## 2019-04-05 NOTE — PROGRESS NOTES
Jesus Cabello is a 52 y.o. female presents to office for physical    Medication list has been reviewed with Jesus Cabello and updated as of today's date     Health Maintenance items with a due date reviewed with patient:  Health Maintenance Due   Topic Date Due    Hepatitis C Screening  1965    FOOT EXAM Q1  05/14/1973    MICROALBUMIN Q1  05/14/1973    EYE EXAM RETINAL OR DILATED  05/14/1973    Shingrix Vaccine Age 50> (1 of 2) 05/14/2013

## 2019-04-08 LAB
HCV AB SER IA-ACNC: 0.14 INDEX
HCV AB SERPL QL IA: NEGATIVE
HCV COMMENT,HCGAC: NORMAL

## 2019-04-12 NOTE — PROGRESS NOTES
All results are within normal limits hemoglobin A1c is slightly better at 6.6 now kidney function liver function are all normal patient has a slightly elevated bad cholesterol LDL that can be discussed in her next visit, possible starting of statin

## 2019-04-26 ENCOUNTER — TELEPHONE (OUTPATIENT)
Dept: FAMILY MEDICINE CLINIC | Age: 54
End: 2019-04-26

## 2019-05-06 ENCOUNTER — HOSPITAL ENCOUNTER (OUTPATIENT)
Dept: NUTRITION | Age: 54
Discharge: HOME | End: 2019-05-06
Payer: COMMERCIAL

## 2019-05-06 PROCEDURE — 97802 MEDICAL NUTRITION INDIV IN: CPT

## 2019-05-06 NOTE — PROGRESS NOTES
[address] [place] [address] 51, 45 [facility], [address] [place] [address] Phone: [phone]  Fax: [phone] Nutrition Assessment  Medical Nutrition Therapy Outpatient Initial Evaluation Patient Name: Miles Desir : 1965 Treatment Diagnosis: T2DM, obesity Referral Source: Gordon Hayes MD [facility]): 2019 Gender: female Age: 52 y.o. Ht: 64 in Wt:  218 lb BMI: 37.5 BMR Male  [facility] Female 0877 Anthropometrics Assessment: obesity Past Medical History includes: T2DM, HTN, high cholesterol Pertinent Medications:  
metformin Biochemical Data:  
Lab Results Component Value Date/Time Hemoglobin A1c 6.6 (H) 2019 08:56 AM  
 
Lab Results Component Value Date/Time Cholesterol, total 193 2019 08:56 AM  
 HDL Cholesterol 58 2019 08:56 AM  
 LDL, calculated 113.6 (H) 2019 08:56 AM  
 VLDL, calculated 21.4 2019 08:56 AM  
 Triglyceride 107 2019 08:56 AM  
 CHOL/HDL Ratio 3.3 2019 08:56 AM  
 
Lab Results Component Value Date/Time ALT (SGPT) 25 2019 08:56 AM  
 AST (SGOT) 14 (L) 2019 08:56 AM  
 Alk. phosphatase 67 2019 08:56 AM  
 Bilirubin, total 0.3 2019 08:56 AM  
 
Lab Results Component Value Date/Time Creatinine 0.87 2019 08:56 AM  
 
Lab Results Component Value Date/Time BUN 15 2019 08:56 AM  
 
No results found for: MCACR, MCA1, MCA2, MCA3, MCAU, MCAU2, MCALPOCT Subjective/Assessment: 
 Patient seen today in nutrition for initial assessment for recent diagnosis of diabetes. She reports over the last year gaining 12-15 pounds due to stress from being busy with work and finishing up school. Her blood sugar has continued to elevate over the year. She is not checking her blood sugars yet. She is interested in getting a glucometer. Reports her goal weight initially is <200 and then <170. Nutrition Diagnosis: Food and nutrition related knowledge deficit related to carbohydrate and meal planning for DM type 2 as evidenced by no previous education provided on DM diet. Current Eating Patterns: Diet recall: 
Breakfast: tropical smoothies, 1 egg, oatmeal, apple. W/e: trinh and muffin Snack: egg, granola bar, protein bars, carrots, celery Lunch: fast food (salad with chicken or sandwich Dinner: fast food, large portions because she is very hungry Snack: popcorn Fluids: water, coffee, occasionally tea or juice. No soda or alcohol. Exercise: none currently due to spinal surgery in 2017 and limited time with work and school. Allergy: honey and apples Supplements: MVI and probiotics daily. Estimate Needs Calories: 1500  Protein: 80 Carbs: 45-90 Kcal/day  g/day  g/day Education provided: Intervention:  Education, Comprehensive - reviewed prescription below. Nutrition education provided on medical nutrition therapy for type 2 Diabetes Mellitus. Educated patient on pathophysiology of Type II diabetes and the rational for dietary modifications to include more fiber, protein and modified carbohydrate consumption. Provided education on food label reading, food groups (carbohydrates, proteins and non-starchy vegetables), and meal planning for diabetes control. Individual carbohydrate goals were assessed and provided to the patient. We discussed the importance of having a protein source with each meal and snack to help with blood sugar control and hunger. Meal and snack times were discussed. Encouraged patient to eat within 2 hours of waking and not go longer than 4-5 hours between meals. Reviewed sample meal plan, tips for eating out and grocery shopping. The patient was provided with my contact info. Patient asked questions and indicated understanding. Handouts Provided: [x]  Carbohydrates [x]  Protein 
[x]  Fiber 
[x]  Serving Sizes [x]  Meal Ideas [x]  Non-starchy Vegetables []  Diabetes []  Cholesterol 
[]  Sodium [x]  Food labels [x]  Snacks 
[]  Others:  
Information Reviewed with: Patient Readiness to Change Stage: []  Pre-contemplative    [x]  Contemplative 
[]  Preparation               []  Action                  []  Maintenance Potential Barriers to Learning: []  Decline in memory    []  Language barrier   []  Other: 
[]  Emotional                  []  Limited mobility 
[]  Lack of motivation     [] Vision, hearing or cognitive impairment Expected Compliance: Good Nutritional Goal - To promote lifestyle changes to result in:   
[x]  Weight loss [x]  Improved diabetic control 
[]  Decreased cholesterol levels 
[]  Decreased blood pressure 
[]  Weight maintenance []  Preventing any interactions associated with food allergies []  Adequate weight gain toward goal weight 
[]  Other:  
  
 
Nutrition Prescription/ 
Patient Goals: ? Limit intake of carbohydrates to no more than. o 45-90 grams of carbohydrate per day. o 15-30 grams of carbohydrate per meal. 
o  0-15 grams of carbohydrate per snack. ? Have 3 meals per day and 1-2 snacks (as needed). ? Aim for at least 3 ounces (deck of cards) of a protein choice at every meal and 1-2 ounces at snack times. ? Choose one or more vegetables to include with lunch, dinner and snacks (as needed). ? Choose grains with >3 grams of Dietary Fiber per serving. ? Have at least 64 ounces of fluids per day. Choose sugar-free beverages only. Milk is ok to have but you will need to include it in your carb allotment for the day. ? Keep condiments/dressing/sauces at 2 grams of net carb or    
      less. ? Get a glucometer to check blood sugar. Dietitian Signature: Aidan Lobato MS, RD Date: 5/6/2019 Follow-up: June 18th at 3:30 at [facility] Time: 4:01 PM

## 2019-05-08 DIAGNOSIS — E11.8 CONTROLLED TYPE 2 DIABETES MELLITUS WITH COMPLICATION, WITHOUT LONG-TERM CURRENT USE OF INSULIN (HCC): ICD-10-CM

## 2019-05-08 NOTE — TELEPHONE ENCOUNTER
Pt calling to request medication   be sent to [facility] [address], [address] [place] [address] Avenue. Pt has 0 tabs remaining. Pts last appt was 4/5/19, next appt is not sched. Advised pt of 72 hour time frame for refill requests.       Requested Prescriptions     Pending Prescriptions Disp Refills    metFORMIN (GLUCOPHAGE) 500 mg tablet [Pharmacy Med Name: METFORMIN 500MG TAB] 60 Tab 2     Sig: TAKE 1 TABLET BY MOUTH TWICE DAILY WITH MEALS

## 2019-05-09 RX ORDER — METFORMIN HYDROCHLORIDE 500 MG/1
TABLET ORAL
Qty: 60 TAB | Refills: 2 | Status: SHIPPED | OUTPATIENT
Start: 2019-05-09 | End: 2019-07-26 | Stop reason: SDUPTHER

## 2019-05-24 ENCOUNTER — HOSPITAL ENCOUNTER (OUTPATIENT)
Dept: MAMMOGRAPHY | Age: 54
Discharge: HOME | End: 2019-05-24
Attending: LEGAL MEDICINE
Payer: COMMERCIAL

## 2019-05-24 DIAGNOSIS — Z12.31 VISIT FOR SCREENING MAMMOGRAM: ICD-10-CM

## 2019-05-24 PROCEDURE — 77063 BREAST TOMOSYNTHESIS BI: CPT

## 2019-06-24 DIAGNOSIS — R63.5 WEIGHT GAIN: ICD-10-CM

## 2019-06-24 DIAGNOSIS — Z79.890 HORMONE REPLACEMENT THERAPY (HRT): ICD-10-CM

## 2019-06-24 RX ORDER — ESTRADIOL 0.5 MG/1
0.5 TABLET ORAL DAILY
Qty: 90 TAB | Refills: 2 | Status: SHIPPED | OUTPATIENT
Start: 2019-06-24 | End: 2020-08-09

## 2019-06-24 RX ORDER — LOSARTAN POTASSIUM 25 MG/1
TABLET ORAL
Qty: 60 TAB | Refills: 2 | OUTPATIENT
Start: 2019-06-24

## 2019-06-25 ENCOUNTER — HOSPITAL ENCOUNTER (OUTPATIENT)
Dept: NUTRITION | Age: 54
Discharge: HOME | End: 2019-06-25
Payer: COMMERCIAL

## 2019-06-25 PROCEDURE — 97803 MED NUTRITION INDIV SUBSEQ: CPT

## 2019-06-25 NOTE — PROGRESS NOTES
NUTRITION  FOLLOW-UP TREATMENT NOTE  Patient Name: Christopher oRbb         Date: 2019  : 1965    YES/NO Patient  Verified  Diagnosis: T2DM, obesity   In time:   12:00             Out time:   12:30   Total Treatment Time (min):   30 minutes     SUBJECTIVE/ASSESSMENT    Changes in medication or medical history? Any new allergies, surgeries or procedures? YES/NO    If yes, update Summary List   Patient seen today in nutrition for follow up diabetes and weight management. Patient reports making changes to her diet the last month. She is trying to be consistent with her diet changes. She does report that she had a lot of family functions this past month and trying to stay on track at those functions was hard. She reports eating smaller portions of the things she wanted that had more carbs. Most days she is staying between 60-90 grams of carbs total per day. Diet recall:  Breakfast: slimfast keto shake or eggs, trinh/sausage, cheese on a small jm bread  Snack: glucerna bar (<10g carbs)  Lunch: atkins frozen meal <15 g carb  Snack:  glucerna bar (<10g carbs)  Dinner: lulú greens, 1/2 c corn and baked chicken  Snack: fruit    Reports occasionally checking her blood sugar at night and it is between 130-140; 2 hours after dinner. Fluids: water and vitamin water zero    Activity: she is trying to walk er dog a little longer at night and walk up the stairs at work at least 1 time per day. Current Wt: 208# Previous Wt: 218# Wt Change: -10#     Achievement of Goals: · Limit intake of carbohydrates to no more than. Met- continue  ? 45-90 grams of carbohydrate per day. ? 15-30 grams of carbohydrate per meal.  ? 0-15 grams of carbohydrate per snack.     · Have 3 meals per day and 1-2 snacks (as needed). Met- continue     · Aim for at least 3 ounces (deck of cards) of a protein choice at every meal and 1-2 ounces at snack times.  Improved - continue     · Choose one or more vegetables to include with lunch, dinner and snacks (as needed). Improved - continue     · Have at least 64 ounces of fluids per day. Choose sugar-free beverages only. Met- continue     · Get a glucometer to check blood sugar. Improved - continue     Patient Education:  []  Review current plan with patient   []  Other:    Handouts/  Information Provided: []  Carbohydrates  []  Protein  []  Fiber  []  Serving Sizes  []  Fluids  []  Snacks []  Diabetes  []  Cholesterol  []  Sodium  []  SBGM  []  Non starchy vegetables  []  Others:      New Patient Goals: · When her weight loss starts to plateau decrease her total carbs per day to consistently 60 g or less per day.   · Check blood sugar daily either fasting or 2 hours after a meal.      PLAN    [x]  Continue on current plan []  Follow-up PRN   []  Discharge due to :    [x]  Next appt: August 6th at 12:00 at [address]: Daniel Viramontes MS, RD    Date: 6/25/2019 Time: 12:40 PM

## 2019-06-27 NOTE — TELEPHONE ENCOUNTER
Per pt's request, Please resend rx for estradiol. Pharmacy stated did not rcv. Also, pt is requesting to stay on losartan. Requested Prescriptions     Pending Prescriptions Disp Refills    estradiol (ESTRACE) 0.5 mg tablet 90 Tab 2     Sig: Take 1 Tab by mouth daily.  lorcaserin (BELVIQ) 10 mg tab 60 Tab 2     Sig: Take 10 mg by mouth two (2) times a day. Max Daily Amount: 20 mg.  Indications: Weight Loss Management for an Obese Person     Refused Prescriptions Disp Refills    losartan (COZAAR) 25 mg tablet [Pharmacy Med Name: LOSARTAN 25MG   TAB] 60 Tab 2     Sig: TAKE 2 TABLETS BY MOUTH ONCE DAILY     Refused By: Rosa Guerrero     Reason for Refusal: Medication never prescribed for the patient

## 2019-06-29 RX ORDER — ESTRADIOL 0.5 MG/1
0.5 TABLET ORAL DAILY
Qty: 90 TAB | Refills: 2 | OUTPATIENT
Start: 2019-06-29

## 2019-06-29 NOTE — TELEPHONE ENCOUNTER
Patient for office visit for all these medication refills need to discuss it and clarify  it with the patient

## 2019-07-01 DIAGNOSIS — I10 ESSENTIAL HYPERTENSION: Primary | ICD-10-CM

## 2019-07-01 RX ORDER — LOSARTAN POTASSIUM 25 MG/1
25 TABLET ORAL DAILY
Qty: 90 TAB | Refills: 0 | Status: CANCELLED | OUTPATIENT
Start: 2019-07-01 | End: 2019-09-29

## 2019-07-02 RX ORDER — LOSARTAN POTASSIUM 100 MG/1
100 TABLET ORAL DAILY
Qty: 90 TAB | Refills: 0 | Status: SHIPPED | OUTPATIENT
Start: 2019-07-02 | End: 2019-11-13 | Stop reason: SDUPTHER

## 2019-07-26 ENCOUNTER — HOSPITAL ENCOUNTER (OUTPATIENT)
Dept: LAB | Age: 54
Discharge: HOME | End: 2019-07-26
Payer: COMMERCIAL

## 2019-07-26 ENCOUNTER — OFFICE VISIT (OUTPATIENT)
Dept: FAMILY MEDICINE CLINIC | Age: 54
End: 2019-07-26

## 2019-07-26 VITALS
TEMPERATURE: 98.2 F | OXYGEN SATURATION: 99 % | DIASTOLIC BLOOD PRESSURE: 81 MMHG | WEIGHT: 208 LBS | HEIGHT: 64 IN | BODY MASS INDEX: 35.51 KG/M2 | SYSTOLIC BLOOD PRESSURE: 139 MMHG | RESPIRATION RATE: 17 BRPM | HEART RATE: 81 BPM

## 2019-07-26 DIAGNOSIS — E11.8 CONTROLLED TYPE 2 DIABETES MELLITUS WITH COMPLICATION, WITHOUT LONG-TERM CURRENT USE OF INSULIN (HCC): ICD-10-CM

## 2019-07-26 DIAGNOSIS — E11.8 CONTROLLED TYPE 2 DIABETES MELLITUS WITH COMPLICATION, WITHOUT LONG-TERM CURRENT USE OF INSULIN (HCC): Primary | ICD-10-CM

## 2019-07-26 DIAGNOSIS — E66.01 SEVERE OBESITY (HCC): ICD-10-CM

## 2019-07-26 DIAGNOSIS — E78.2 MIXED HYPERLIPIDEMIA: ICD-10-CM

## 2019-07-26 DIAGNOSIS — I10 ESSENTIAL HYPERTENSION: ICD-10-CM

## 2019-07-26 LAB — HBA1C MFR BLD HPLC: 6 %

## 2019-07-26 PROCEDURE — 82043 UR ALBUMIN QUANTITATIVE: CPT

## 2019-07-26 RX ORDER — METFORMIN HYDROCHLORIDE 500 MG/1
500 TABLET ORAL 2 TIMES DAILY WITH MEALS
Qty: 180 TAB | Refills: 2 | Status: SHIPPED | OUTPATIENT
Start: 2019-07-26 | End: 2020-09-20 | Stop reason: SDUPTHER

## 2019-07-26 NOTE — PROGRESS NOTES
Gerre Hamman is a 62 y.o. female presents to office for dm    Medication list has been reviewed with Gerre Hamman and updated as of today's date     Health Maintenance items with a due date reviewed with patient:  Health Maintenance Due   Topic Date Due    FOOT EXAM Q1  05/14/1973    MICROALBUMIN Q1  05/14/1973    EYE EXAM RETINAL OR DILATED  05/14/1973

## 2019-07-26 NOTE — PROGRESS NOTES
[address] [place] [address]     Chief Complaint   Patient presents with    Diabetes     Vitals:    07/26/19 0823   BP: 139/81   Pulse: 81   Resp: 17   Temp: 98.2 °F (36.8 °C)   TempSrc: Oral   SpO2: 99%   Weight: 208 lb (94.3 kg)   Height: 5' 4\" (1.626 m)   PainSc:   0 - No pain         HPI: Patient is here for follow-up on diabetes and hypertension she is very adherent to her medication, she has seen dietitian and following lifestyle modification and lost 10 pounds since last visit. She had a schedule her ophthalmology appointment for next week    She will be referred to podiatrist today      Hemoglobin A1c will be checked today      Blood work will be ordered so the patient can get it 1 week before next visit. Past Medical History:   Diagnosis Date    Gynecological disorder     fibroids and low test.     Hypertension     Neck pain      Past Surgical History:   Procedure Laterality Date    HX GYN      HX HYSTERECTOMY      fibroids     HX HYSTERECTOMY      removal of ovaries     Social History     Tobacco Use    Smoking status: Never Smoker    Smokeless tobacco: Never Used   Substance Use Topics    Alcohol use: Yes     Comment: socailly        Family History   Problem Relation Age of Onset    Hypertension Mother     High Cholesterol Mother     No Known Problems Father        Review of Systems   Constitutional: Negative for chills, fever, malaise/fatigue and weight loss. HENT: Negative for congestion, ear discharge, ear pain, hearing loss and nosebleeds. Eyes: Negative for blurred vision, double vision and discharge. Respiratory: Negative for cough. Cardiovascular: Negative for chest pain, palpitations, claudication and leg swelling. Gastrointestinal: Negative for abdominal pain, constipation, diarrhea, nausea and vomiting. Genitourinary: Negative for dysuria, frequency and urgency. Musculoskeletal: Negative for myalgias. Skin: Negative for itching and rash.    Neurological: Negative for dizziness, tingling, sensory change, speech change, focal weakness, weakness and headaches. Psychiatric/Behavioral: Negative for depression and suicidal ideas. Physical Exam   Constitutional: She is oriented to person, place, and time. She appears well-developed and well-nourished. No distress. HENT:   Head: Normocephalic and atraumatic. Mouth/Throat: Oropharynx is clear and moist. No oropharyngeal exudate. Eyes: Pupils are equal, round, and reactive to light. Conjunctivae are normal. Right eye exhibits no discharge. Left eye exhibits no discharge. No scleral icterus. Neck: Normal range of motion. Neck supple. No thyromegaly present. Cardiovascular: Normal rate, regular rhythm and normal heart sounds. No murmur heard. Pulmonary/Chest: Effort normal and breath sounds normal. No respiratory distress. She has no wheezes. She has no rales. She exhibits no tenderness. Abdominal: Soft. She exhibits no distension. There is no tenderness. There is no rebound. Musculoskeletal: Normal range of motion. She exhibits no edema, tenderness or deformity. Lymphadenopathy:     She has no cervical adenopathy. Neurological: She is alert and oriented to person, place, and time. No cranial nerve deficit. Coordination normal.   Skin: Skin is warm and dry. No rash noted. She is not diaphoretic. No erythema. No pallor. Psychiatric: She has a normal mood and affect. Her behavior is normal. Judgment and thought content normal.   Nursing note and vitals reviewed. Assessment and plan     Plan of care has been discussed with the patient, he agrees to the plan and verbalized understanding. All his questions were answered  More than 50% of the time spent in this visit was counseling the patient about  illness and treatment options         1. Controlled type 2 diabetes mellitus with complication, without long-term current use of insulin (HCC)    - metFORMIN (GLUCOPHAGE) 500 mg tablet;  Take 1 Tab by mouth two (2) times daily (with meals). Dispense: 180 Tab; Refill: 2  - REFERRAL TO PODIATRY  - MICROALBUMIN, UR, RAND W/ MICROALB/CREAT RATIO; Future    2. Mixed hyperlipidemia  Last LDL was 113, patient is not on any statin advised to take omega-3 supplements and fish oil and if she reluctant to start statin at this point she can use red rice yeast recheck lipid profile in 3 month     3. Essential hypertension  Well-controlled continue losartan 100 mg daily    4. Severe obesity ([address]  Patient is doing well with lifestyle modification and she lost 10 pounds    Current Outpatient Medications   Medication Sig Dispense Refill    metFORMIN (GLUCOPHAGE) 500 mg tablet Take 1 Tab by mouth two (2) times daily (with meals). 180 Tab 2    losartan (COZAAR) 100 mg tablet Take 1 Tab by mouth daily. (Patient taking differently: Take 100 mg by mouth daily. Indications: take 0.5mg daily) 90 Tab 0    estradiol (ESTRACE) 0.5 mg tablet Take 1 Tab by mouth daily. 90 Tab 2    citalopram (CELEXA) 10 mg tablet Take 1 Tab by mouth daily. 30 Tab 2       Patient Active Problem List    Diagnosis Date Noted    Controlled type 2 diabetes mellitus with complication, without long-term current use of insulin ([facility] 07/26/2019    Severe obesity [facility] 12/14/2018    Mixed hyperlipidemia 12/14/2018    HTN (hypertension) 06/01/2016     Results for orders placed or performed during the hospital encounter of 04/05/19   CBC WITH AUTOMATED DIFF   Result Value Ref Range    WBC 6.6 4.6 - 13.2 K/uL    RBC 4.22 4.20 - 5.30 M/uL    HGB 11.6 (L) 12.0 - 16.0 g/dL    HCT 38.0 35.0 - 45.0 %    MCV 90.0 74.0 - 97.0 FL    MCH 27.5 24.0 - 34.0 PG    MCHC 30.5 (L) 31.0 - 37.0 g/dL    RDW 13.4 11.6 - 14.5 %    PLATELET 149 573 - 936 K/uL    MPV 9.8 9.2 - 11.8 FL    NEUTROPHILS 61 40 - 73 %    LYMPHOCYTES 30 21 - 52 %    MONOCYTES 6 3 - 10 %    EOSINOPHILS 2 0 - 5 %    BASOPHILS 1 0 - 2 %    ABS. NEUTROPHILS 4.0 1.8 - 8.0 K/UL    ABS.  LYMPHOCYTES 2.0 0.9 - 3.6 K/UL ABS. MONOCYTES 0.4 0.05 - 1.2 K/UL    ABS. EOSINOPHILS 0.1 0.0 - 0.4 K/UL    ABS. BASOPHILS 0.0 0.0 - 0.1 K/UL    DF AUTOMATED     METABOLIC PANEL, COMPREHENSIVE   Result Value Ref Range    Sodium 141 136 - 145 mmol/L    Potassium 4.2 3.5 - 5.5 mmol/L    Chloride 105 100 - 108 mmol/L    CO2 28 21 - 32 mmol/L    Anion gap 8 3.0 - 18 mmol/L    Glucose 103 (H) 74 - 99 mg/dL    BUN 15 7.0 - 18 MG/DL    Creatinine 0.87 0.6 - 1.3 MG/DL    BUN/Creatinine ratio 17 12 - 20      GFR est AA >60 >60 ml/min/1.73m2    GFR est non-AA >60 >60 ml/min/1.73m2    Calcium 8.8 8.5 - 10.1 MG/DL    Bilirubin, total 0.3 0.2 - 1.0 MG/DL    ALT (SGPT) 25 13 - 56 U/L    AST (SGOT) 14 (L) 15 - 37 U/L    Alk. phosphatase 67 45 - 117 U/L    Protein, total 7.5 6.4 - 8.2 g/dL    Albumin 3.9 3.4 - 5.0 g/dL    Globulin 3.6 2.0 - 4.0 g/dL    A-G Ratio 1.1 0.8 - 1.7     LIPID PANEL   Result Value Ref Range    LIPID PROFILE          Cholesterol, total 193 <200 MG/DL    Triglyceride 107 <150 MG/DL    HDL Cholesterol 58 40 - 60 MG/DL    LDL, calculated 113.6 (H) 0 - 100 MG/DL    VLDL, calculated 21.4 MG/DL    CHOL/HDL Ratio 3.3 0 - 5.0     URINALYSIS W/MICROSCOPIC   Result Value Ref Range    Color YELLOW      Appearance CLEAR      Specific gravity 1.018 1.005 - 1.030      pH (UA) 6.0 5.0 - 8.0      Protein NEGATIVE  NEG mg/dL    Glucose NEGATIVE  NEG mg/dL    Ketone NEGATIVE  NEG mg/dL    Bilirubin NEGATIVE  NEG      Blood NEGATIVE  NEG      Urobilinogen 0.2 0.2 - 1.0 EU/dL    Nitrites NEGATIVE  NEG      Leukocyte Esterase NEGATIVE  NEG      WBC NEGATIVE  0 - 4 /hpf    RBC 0 to 3 0 - 5 /hpf    Epithelial cells FEW 0 - 5 /lpf    Bacteria NEGATIVE  NEG /hpf    Amorphous Crystals FEW (A) NEG     HEMOGLOBIN A1C W/O EAG   Result Value Ref Range    Hemoglobin A1c 6.6 (H) 4.2 - 5.6 %   HEPATITIS C AB   Result Value Ref Range    Hepatitis C virus Ab 0.14 <0.80 Index    Hep C  virus Ab Interp.  NEGATIVE  NEG      Hep C  virus Ab comment           No visits with results within 3 Month(s) from this visit. Latest known visit with results is:   Hospital Outpatient Visit on 04/05/2019   Component Date Value Ref Range Status    WBC 04/05/2019 6.6  4.6 - 13.2 K/uL Final    RBC 04/05/2019 4.22  4.20 - 5.30 M/uL Final    HGB 04/05/2019 11.6* 12.0 - 16.0 g/dL Final    HCT 04/05/2019 38.0  35.0 - 45.0 % Final    MCV 04/05/2019 90.0  74.0 - 97.0 FL Final    MCH 04/05/2019 27.5  24.0 - 34.0 PG Final    MCHC 04/05/2019 30.5* 31.0 - 37.0 g/dL Final    RDW 04/05/2019 13.4  11.6 - 14.5 % Final    PLATELET 01/13/4103 021  135 - 420 K/uL Final    MPV 04/05/2019 9.8  9.2 - 11.8 FL Final    NEUTROPHILS 04/05/2019 61  40 - 73 % Final    LYMPHOCYTES 04/05/2019 30  21 - 52 % Final    MONOCYTES 04/05/2019 6  3 - 10 % Final    EOSINOPHILS 04/05/2019 2  0 - 5 % Final    BASOPHILS 04/05/2019 1  0 - 2 % Final    ABS. NEUTROPHILS 04/05/2019 4.0  1.8 - 8.0 K/UL Final    ABS. LYMPHOCYTES 04/05/2019 2.0  0.9 - 3.6 K/UL Final    ABS. MONOCYTES 04/05/2019 0.4  0.05 - 1.2 K/UL Final    ABS. EOSINOPHILS 04/05/2019 0.1  0.0 - 0.4 K/UL Final    ABS.  BASOPHILS 04/05/2019 0.0  0.0 - 0.1 K/UL Final    DF 04/05/2019 AUTOMATED    Final    Sodium 04/05/2019 141  136 - 145 mmol/L Final    Potassium 04/05/2019 4.2  3.5 - 5.5 mmol/L Final    Chloride 04/05/2019 105  100 - 108 mmol/L Final    CO2 04/05/2019 28  21 - 32 mmol/L Final    Anion gap 04/05/2019 8  3.0 - 18 mmol/L Final    Glucose 04/05/2019 103* 74 - 99 mg/dL Final    BUN 04/05/2019 15  7.0 - 18 MG/DL Final    Creatinine 04/05/2019 0.87  0.6 - 1.3 MG/DL Final    BUN/Creatinine ratio 04/05/2019 17  12 - 20   Final    GFR est AA 04/05/2019 >60  >60 ml/min/1.73m2 Final    GFR est non-AA 04/05/2019 >60  >60 ml/min/1.73m2 Final    Comment: (NOTE)  Estimated GFR is calculated using the Modification of Diet in Renal   Disease (MDRD) Study equation, reported for both  Americans   (GFRAA) and non- Americans (GFRNA), and normalized to 1.73m2   body surface area. The physician must decide which value applies to   the patient. The MDRD study equation should only be used in   individuals age 25 or older. It has not been validated for the   following: pregnant women, patients with serious comorbid conditions,   or on certain medications, or persons with extremes of body size,   muscle mass, or nutritional status.  Calcium 04/05/2019 8.8  8.5 - 10.1 MG/DL Final    Bilirubin, total 04/05/2019 0.3  0.2 - 1.0 MG/DL Final    ALT (SGPT) 04/05/2019 25  13 - 56 U/L Final    AST (SGOT) 04/05/2019 14* 15 - 37 U/L Final    Alk. phosphatase 04/05/2019 67  45 - 117 U/L Final    Protein, total 04/05/2019 7.5  6.4 - 8.2 g/dL Final    Albumin 04/05/2019 3.9  3.4 - 5.0 g/dL Final    Globulin 04/05/2019 3.6  2.0 - 4.0 g/dL Final    A-G Ratio 04/05/2019 1.1  0.8 - 1.7   Final    LIPID PROFILE 04/05/2019        Final    Cholesterol, total 04/05/2019 193  <200 MG/DL Final    Triglyceride 04/05/2019 107  <150 MG/DL Final    Comment: The drugs N-acetylcysteine (NAC) and  Metamiszole have been found to cause falsely  low results in this chemical assay. Please  be sure to submit blood samples obtained  BEFORE administration of either of these  drugs to assure correct results.       HDL Cholesterol 04/05/2019 58  40 - 60 MG/DL Final    LDL, calculated 04/05/2019 113.6* 0 - 100 MG/DL Final    VLDL, calculated 04/05/2019 21.4  MG/DL Final    CHOL/HDL Ratio 04/05/2019 3.3  0 - 5.0   Final    Color 04/05/2019 YELLOW    Final    Appearance 04/05/2019 CLEAR    Final    Specific gravity 04/05/2019 1.018  1.005 - 1.030   Final    pH (UA) 04/05/2019 6.0  5.0 - 8.0   Final    Protein 04/05/2019 NEGATIVE   NEG mg/dL Final    Glucose 04/05/2019 NEGATIVE   NEG mg/dL Final    Ketone 04/05/2019 NEGATIVE   NEG mg/dL Final    Bilirubin 04/05/2019 NEGATIVE   NEG   Final    Blood 04/05/2019 NEGATIVE   NEG   Final    Urobilinogen 04/05/2019 0.2  0.2 - 1.0 EU/dL Final    Nitrites 04/05/2019 NEGATIVE   NEG   Final    Leukocyte Esterase 04/05/2019 NEGATIVE   NEG   Final    WBC 04/05/2019 NEGATIVE   0 - 4 /hpf Final    RBC 04/05/2019 0 to 3  0 - 5 /hpf Final    Epithelial cells 04/05/2019 FEW  0 - 5 /lpf Final    Bacteria 04/05/2019 NEGATIVE   NEG /hpf Final    Amorphous Crystals 04/05/2019 FEW* NEG   Final    Hemoglobin A1c 04/05/2019 6.6* 4.2 - 5.6 % Final    Comment: (NOTE)  HbA1C Interpretive Ranges  <5.7              Normal  5.7 - 6.4         Consider Prediabetes  >6.5              Consider Diabetes      Hepatitis C virus Ab 04/05/2019 0.14  <0.80 Index Final    Hep C  virus Ab Interp. 04/05/2019 NEGATIVE   NEG   Final    Hep C  virus Ab comment 04/05/2019        Final    Comment: Index <0.80. ......................... [employer]ster Negative  Index > or = to 0.80 and <1.00. .... [employer]ster Silver Forester Equivocal  Index >1.00. ......................... [employer]ster Positive          For Equivocal or Positive results, confirmation with Hepatitis C RNA by PCR or bDNA is suggested.

## 2019-07-27 LAB
CREAT UR-MCNC: 293 MG/DL (ref 30–125)
MICROALBUMIN UR-MCNC: 1.86 MG/DL (ref 0–3)
MICROALBUMIN/CREAT UR-RTO: 6 MG/G (ref 0–30)

## 2019-10-28 ENCOUNTER — OFFICE VISIT (OUTPATIENT)
Dept: FAMILY MEDICINE CLINIC | Age: 54
End: 2019-10-28

## 2019-10-28 ENCOUNTER — HOSPITAL ENCOUNTER (OUTPATIENT)
Dept: LAB | Age: 54
Discharge: HOME | End: 2019-10-28
Payer: COMMERCIAL

## 2019-10-28 VITALS
HEIGHT: 64 IN | TEMPERATURE: 98.9 F | OXYGEN SATURATION: 99 % | BODY MASS INDEX: 35.78 KG/M2 | WEIGHT: 209.6 LBS | SYSTOLIC BLOOD PRESSURE: 128 MMHG | RESPIRATION RATE: 18 BRPM | DIASTOLIC BLOOD PRESSURE: 83 MMHG | HEART RATE: 84 BPM

## 2019-10-28 DIAGNOSIS — E66.01 SEVERE OBESITY (HCC): Primary | ICD-10-CM

## 2019-10-28 DIAGNOSIS — E78.2 MIXED HYPERLIPIDEMIA: ICD-10-CM

## 2019-10-28 DIAGNOSIS — I10 ESSENTIAL HYPERTENSION: ICD-10-CM

## 2019-10-28 DIAGNOSIS — M47.22 CERVICAL RADICULOPATHY DUE TO DEGENERATIVE JOINT DISEASE OF SPINE: ICD-10-CM

## 2019-10-28 DIAGNOSIS — E11.8 CONTROLLED TYPE 2 DIABETES MELLITUS WITH COMPLICATION, WITHOUT LONG-TERM CURRENT USE OF INSULIN (HCC): ICD-10-CM

## 2019-10-28 PROBLEM — M54.12 CERVICAL RADICULOPATHY: Status: ACTIVE | Noted: 2017-09-06

## 2019-10-28 LAB
ALBUMIN SERPL-MCNC: 4.1 G/DL (ref 3.4–5)
ALBUMIN/GLOB SERPL: 1.2 {RATIO} (ref 0.8–1.7)
ALP SERPL-CCNC: 59 U/L (ref 45–117)
ALT SERPL-CCNC: 23 U/L (ref 13–56)
ANION GAP SERPL CALC-SCNC: 7 MMOL/L (ref 3–18)
AST SERPL-CCNC: 13 U/L (ref 10–38)
BASOPHILS # BLD: 0 K/UL (ref 0–0.1)
BASOPHILS NFR BLD: 1 % (ref 0–2)
BILIRUB SERPL-MCNC: 0.3 MG/DL (ref 0.2–1)
BUN SERPL-MCNC: 12 MG/DL (ref 7–18)
BUN/CREAT SERPL: 13 (ref 12–20)
CALCIUM SERPL-MCNC: 9 MG/DL (ref 8.5–10.1)
CHLORIDE SERPL-SCNC: 106 MMOL/L (ref 100–111)
CO2 SERPL-SCNC: 29 MMOL/L (ref 21–32)
CREAT SERPL-MCNC: 0.96 MG/DL (ref 0.6–1.3)
DIFFERENTIAL METHOD BLD: ABNORMAL
EOSINOPHIL # BLD: 0.1 K/UL (ref 0–0.4)
EOSINOPHIL NFR BLD: 2 % (ref 0–5)
ERYTHROCYTE [DISTWIDTH] IN BLOOD BY AUTOMATED COUNT: 14.2 % (ref 11.6–14.5)
GLOBULIN SER CALC-MCNC: 3.3 G/DL (ref 2–4)
GLUCOSE SERPL-MCNC: 111 MG/DL (ref 74–99)
HBA1C MFR BLD: 6.2 % (ref 4.2–5.6)
HCT VFR BLD AUTO: 38.8 % (ref 35–45)
HGB BLD-MCNC: 11.6 G/DL (ref 12–16)
LYMPHOCYTES # BLD: 2.1 K/UL (ref 0.9–3.6)
LYMPHOCYTES NFR BLD: 34 % (ref 21–52)
MCH RBC QN AUTO: 27.8 PG (ref 24–34)
MCHC RBC AUTO-ENTMCNC: 29.9 G/DL (ref 31–37)
MCV RBC AUTO: 92.8 FL (ref 74–97)
MONOCYTES # BLD: 0.4 K/UL (ref 0.05–1.2)
MONOCYTES NFR BLD: 6 % (ref 3–10)
NEUTS SEG # BLD: 3.6 K/UL (ref 1.8–8)
NEUTS SEG NFR BLD: 57 % (ref 40–73)
PLATELET # BLD AUTO: 361 K/UL (ref 135–420)
PMV BLD AUTO: 9.2 FL (ref 9.2–11.8)
POTASSIUM SERPL-SCNC: 4.2 MMOL/L (ref 3.5–5.5)
PROT SERPL-MCNC: 7.4 G/DL (ref 6.4–8.2)
RBC # BLD AUTO: 4.18 M/UL (ref 4.2–5.3)
SODIUM SERPL-SCNC: 142 MMOL/L (ref 136–145)
WBC # BLD AUTO: 6.3 K/UL (ref 4.6–13.2)

## 2019-10-28 PROCEDURE — 83036 HEMOGLOBIN GLYCOSYLATED A1C: CPT

## 2019-10-28 PROCEDURE — 36415 COLL VENOUS BLD VENIPUNCTURE: CPT

## 2019-10-28 PROCEDURE — 85025 COMPLETE CBC W/AUTO DIFF WBC: CPT

## 2019-10-28 PROCEDURE — 80053 COMPREHEN METABOLIC PANEL: CPT

## 2019-10-28 PROCEDURE — 80061 LIPID PANEL: CPT

## 2019-10-28 RX ORDER — TERBINAFINE HYDROCHLORIDE 250 MG/1
TABLET ORAL
Refills: 0 | COMMUNITY
Start: 2019-10-10 | End: 2019-12-16

## 2019-10-28 RX ORDER — IBUPROFEN 800 MG/1
TABLET ORAL
Refills: 2 | COMMUNITY
Start: 2019-10-11 | End: 2019-12-16

## 2019-10-28 NOTE — PROGRESS NOTES
[address] [place] [address]     Chief Complaint   Patient presents with    Hypertension     f/u    Diabetes     f/u    Cholesterol Problem     f/u     Vitals:    10/28/19 0733   BP: 128/83   Pulse: 84   Resp: 18   Temp: 98.9 °F (37.2 °C)   TempSrc: Oral   SpO2: 99%   Weight: 209 lb 9.6 oz (95.1 kg)   Height: 5' 4\" (1.626 m)   PainSc:   0 - No pain         HPI patient is here for follow up for 3 month has been stable with no acute complains     She has been doing well, but she has been very stressed out regarding she has been doing this work for 20 years, but lately the weight load has increased. Patient is not depressed and anxious but she feels the stress is building on    I have advised her about lifestyle modification increase physical activity and exercise might decrease her stress level is low she can do something at home yoga or Pilates. Has been no changes in place her medication from last visit    No emergency room visits    She will do blood work today as ordered last visit        Past Medical History:   Diagnosis Date    Gynecological disorder     fibroids and low test.     Hypertension     Neck pain      Past Surgical History:   Procedure Laterality Date    HX GYN      HX HYSTERECTOMY      fibroids     HX HYSTERECTOMY      removal of ovaries     Social History     Tobacco Use    Smoking status: Never Smoker    Smokeless tobacco: Never Used   Substance Use Topics    Alcohol use: Yes     Comment: socailly        Family History   Problem Relation Age of Onset    Hypertension Mother     High Cholesterol Mother     No Known Problems Father        Review of Systems   Constitutional: Negative for chills, fever, malaise/fatigue and weight loss. HENT: Negative for congestion, ear discharge, ear pain, hearing loss, nosebleeds, sinus pain and sore throat. Eyes: Negative for blurred vision, double vision and discharge.    Respiratory: Negative for cough, hemoptysis, sputum production, shortness of breath and wheezing. Cardiovascular: Negative for chest pain, palpitations, claudication and leg swelling. Gastrointestinal: Negative for abdominal pain, constipation, diarrhea, nausea and vomiting. Genitourinary: Negative for dysuria, frequency and urgency. Musculoskeletal: Negative for myalgias and neck pain. Skin: Negative for itching and rash. Neurological: Negative for dizziness, tingling, sensory change, speech change, focal weakness, weakness and headaches. Psychiatric/Behavioral: Negative for depression, hallucinations, substance abuse and suicidal ideas. The patient is not nervous/anxious and does not have insomnia. Patient is stressed out due to over work        Physical Exam   Constitutional: She is oriented to person, place, and time. She appears well-developed and well-nourished. No distress. HENT:   Head: Normocephalic and atraumatic. Mouth/Throat: Oropharynx is clear and moist. No oropharyngeal exudate. Eyes: Pupils are equal, round, and reactive to light. Conjunctivae are normal. Right eye exhibits no discharge. Left eye exhibits no discharge. No scleral icterus. Neck: Normal range of motion. Neck supple. No thyromegaly present. Cardiovascular: Normal rate, regular rhythm and normal heart sounds. No murmur heard. Pulmonary/Chest: Effort normal and breath sounds normal. No respiratory distress. She has no wheezes. She has no rales. She exhibits no tenderness. Abdominal: Soft. She exhibits no distension. There is no tenderness. There is no rebound. Musculoskeletal: Normal range of motion. She exhibits no edema, tenderness or deformity. Lymphadenopathy:     She has no cervical adenopathy. Neurological: She is alert and oriented to person, place, and time. No cranial nerve deficit. Coordination normal.   Skin: Skin is warm and dry. No rash noted. She is not diaphoretic. No erythema. No pallor. Psychiatric: She has a normal mood and affect.  Her behavior is normal. Judgment and thought content normal.   Nursing note and vitals reviewed. Assessment and plan     Plan of care has been discussed with the patient, he agrees to the plan and verbalized understanding. All his questions were answered  More than 50% of the time spent in this visit was counseling the patient about  illness and treatment options         1. Severe obesity ([address]  Lifestyle modification has been reemphasized    2. Mixed hyperlipidemia  She is not on a statin currently    3. Essential hypertension  Blood pressures well controlled continue same medication    4. Controlled type 2 diabetes mellitus with complication, without long-term current use of insulin (HCC)  Continue metformin and recheck hemoglobin A1c today    5. Cervical radiculopathy due to degenerative joint disease of spine  Stable with no pain    Current Outpatient Medications   Medication Sig Dispense Refill    ibuprofen (MOTRIN) 800 mg tablet TAKE 1 TABLET BY MOUTH THREE TIMES DAILY AS NEEDED FOR PAIN  2    terbinafine HCl (LAMISIL) 250 mg tablet TAKE 1 TABLET BY MOUTH ONCE DAILY  0    metFORMIN (GLUCOPHAGE) 500 mg tablet Take 1 Tab by mouth two (2) times daily (with meals). 180 Tab 2    losartan (COZAAR) 100 mg tablet Take 1 Tab by mouth daily. 90 Tab 0    estradiol (ESTRACE) 0.5 mg tablet Take 1 Tab by mouth daily. 90 Tab 2    citalopram (CELEXA) 10 mg tablet Take 1 Tab by mouth daily.  30 Tab 2       Patient Active Problem List    Diagnosis Date Noted    Controlled type 2 diabetes mellitus with complication, without long-term current use of insulin ([facility] 07/26/2019    Severe obesity ([facility] 12/14/2018    Mixed hyperlipidemia 12/14/2018    Cervical radiculopathy due to degenerative joint disease of spine 09/06/2017    Cervical radiculopathy 09/06/2017    HTN (hypertension) 06/01/2016     Results for orders placed or performed during the hospital encounter of 07/26/19   MICROALBUMIN, UR, RAND W/ MICROALB/CREAT RATIO Result Value Ref Range    Microalbumin,urine random 1.86 0 - 3.0 MG/DL    Creatinine, urine 293.00 (H) 30 - 125 mg/dL    Microalbumin/Creat ratio (mg/g creat) 6 0 - 30 mg/g     No visits with results within 3 Month(s) from this visit. Latest known visit with results is:   Hospital Outpatient Visit on 07/26/2019   Component Date Value Ref Range Status    Microalbumin,urine random 07/26/2019 1.86  0 - 3.0 MG/DL Final    Creatinine, urine 07/26/2019 293.00* 30 - 125 mg/dL Final    Microalbumin/Creat ratio (mg/g  07/26/2019 6  0 - 30 mg/g Final          Follow-up and Dispositions    · Return in about 4 months (around 2/28/2020).

## 2019-10-28 NOTE — PROGRESS NOTES
Ana María Guzman is a 62 y.o. female presents in office for diabetes, hypertension, and cholesterol. Health Maintenance Due   Topic Date Due    FOOT EXAM Q1  05/14/1973    Shingrix Vaccine Age 50> (2 of 2) 07/28/2019       1. Have you been to the ER, urgent care clinic since your last visit? Hospitalized since your last visit? No    2. Have you seen or consulted any other health care providers outside of the [address] [place] [address] since your last visit? Include any pap smears or colon screening. Yes, podiatry--Dr. Selena Ha    Patient had foot exam 3 weeks ago with Dr. [facility].

## 2019-10-29 LAB
CHOLEST SERPL-MCNC: 229 MG/DL
HDLC SERPL-MCNC: 58 MG/DL (ref 40–60)
HDLC SERPL: 3.9 {RATIO} (ref 0–5)
LDLC SERPL CALC-MCNC: 146.6 MG/DL (ref 0–100)
LIPID PROFILE,FLP: ABNORMAL
TRIGL SERPL-MCNC: 122 MG/DL (ref ?–150)
VLDLC SERPL CALC-MCNC: 24.4 MG/DL

## 2019-10-30 ENCOUNTER — TELEPHONE (OUTPATIENT)
Dept: FAMILY MEDICINE CLINIC | Age: 54
End: 2019-10-30

## 2019-10-30 NOTE — TELEPHONE ENCOUNTER
Spoke to patient about results and verified using 2 patient identifiers. Patient acknowledge results given under advisement of PCP. Patient repeats any additional instruction. Patient has no other concerns at this time. There is improvement in hemoglobin A1c  Now it is 6.2 compared to last time it was 6.6     Kidney function is normal     Hemoglobin level has been stable     Cholesterol is slightly increased LDL is 146   And a month ago it was only 107!!      Because of the patient's blood sugar, she should consider starting statin, other option would be strict lifestyle modification and weight loss and recheck in 6 months, if LDL is still elevated patient will be started statins

## 2019-10-30 NOTE — PROGRESS NOTES
There is improvement in hemoglobin A1c  Now it is 6.2 compared to last time it was 6.6    Kidney function is normal    Hemoglobin level has been stable    Cholesterol is slightly increased LDL is 146   And a month ago it was only 107!!     Because of the patient's blood sugar, she should consider starting statin, other option would be strict lifestyle modification and weight loss and recheck in 6 months, if LDL is still elevated patient will be started statins

## 2019-10-30 NOTE — TELEPHONE ENCOUNTER
----- Message from Trisha Genao MD sent at 10/30/2019  2:18 PM EDT -----  There is improvement in hemoglobin A1c  Now it is 6.2 compared to last time it was 6.6    Kidney function is normal    Hemoglobin level has been stable    Cholesterol is slightly increased LDL is 146   And a month ago it was only 107!!     Because of the patient's blood sugar, she should consider starting statin, other option would be strict lifestyle modification and weight loss and recheck in 6 months, if LDL is still elevated patient will be started statins

## 2019-11-13 DIAGNOSIS — I10 ESSENTIAL HYPERTENSION: ICD-10-CM

## 2019-11-13 RX ORDER — LOSARTAN POTASSIUM 100 MG/1
TABLET ORAL
Qty: 90 TAB | Refills: 0 | Status: SHIPPED | OUTPATIENT
Start: 2019-11-13 | End: 2020-02-21

## 2019-12-16 ENCOUNTER — OFFICE VISIT (OUTPATIENT)
Dept: FAMILY MEDICINE CLINIC | Age: 54
End: 2019-12-16

## 2019-12-16 VITALS
HEART RATE: 101 BPM | DIASTOLIC BLOOD PRESSURE: 80 MMHG | OXYGEN SATURATION: 100 % | SYSTOLIC BLOOD PRESSURE: 157 MMHG | RESPIRATION RATE: 18 BRPM | TEMPERATURE: 98.6 F | BODY MASS INDEX: 35.96 KG/M2 | HEIGHT: 64 IN | WEIGHT: 210.6 LBS

## 2019-12-16 DIAGNOSIS — F41.1 GAD (GENERALIZED ANXIETY DISORDER): ICD-10-CM

## 2019-12-16 DIAGNOSIS — I10 ESSENTIAL HYPERTENSION: ICD-10-CM

## 2019-12-16 DIAGNOSIS — E11.8 CONTROLLED TYPE 2 DIABETES MELLITUS WITH COMPLICATION, WITHOUT LONG-TERM CURRENT USE OF INSULIN (HCC): Primary | ICD-10-CM

## 2019-12-16 DIAGNOSIS — E66.01 SEVERE OBESITY (HCC): ICD-10-CM

## 2019-12-16 DIAGNOSIS — E78.2 MIXED HYPERLIPIDEMIA: ICD-10-CM

## 2019-12-16 DIAGNOSIS — L73.1 INGROWN HAIR: ICD-10-CM

## 2019-12-16 RX ORDER — SERTRALINE HYDROCHLORIDE 25 MG/1
25 TABLET, FILM COATED ORAL DAILY
Qty: 30 TAB | Refills: 0 | Status: SHIPPED | OUTPATIENT
Start: 2019-12-16 | End: 2020-03-09

## 2019-12-16 NOTE — PROGRESS NOTES
[address] [place] [address]     Chief Complaint   Patient presents with   [facility] Vaginal Itching     x 24 hours     Vitals:    12/16/19 1029   BP: 157/80   Pulse: (!) 101   Resp: 18   Temp: 98.6 °F (37 °C)   TempSrc: Oral   SpO2: 100%   Weight: 210 lb 9.6 oz (95.5 kg)   Height: 5' 4\" (1.626 m)   PainSc:   0 - No pain         HPI:Candi is here for vaginal lesion in the labia about 2 days go ,itcy not painful , no discharge , not  asexually  active for 1 year , patient thinks it happens after wearing a pantyhose that has opening, it was new and then irrigated irrigated her. Patient has a lot of stress from her job and she feels very overwhelmed, and at this time she is not ready to change jobs, she is going to school and she is also not able to cope well with the school    I have advised the patient previously to seek psychology and counseling sessions but she did not, she was taking Celexa 10 mg daily but she had stopped it because of side effects it made her feel not herself. Past Medical History:   Diagnosis Date    Gynecological disorder     fibroids and low test.     Hypertension     Neck pain      Past Surgical History:   Procedure Laterality Date    HX GYN      HX HYSTERECTOMY      fibroids     HX HYSTERECTOMY      removal of ovaries     Social History     Tobacco Use    Smoking status: Never Smoker    Smokeless tobacco: Never Used   Substance Use Topics    Alcohol use: Yes     Comment: socailly        Family History   Problem Relation Age of Onset    Hypertension Mother     High Cholesterol Mother     No Known Problems Father        Review of Systems   Constitutional: Negative for chills, fever, malaise/fatigue and weight loss. HENT: Negative for congestion, ear discharge, ear pain, hearing loss and nosebleeds. Eyes: Negative for blurred vision, double vision and discharge. Respiratory: Negative for cough. Cardiovascular: Negative for chest pain, palpitations, claudication and leg swelling. Gastrointestinal: Negative for abdominal pain, constipation, diarrhea, nausea and vomiting. Genitourinary: Negative for dysuria, frequency and urgency. Musculoskeletal: Negative for myalgias. Skin: Negative for itching and rash. Neurological: Negative for dizziness, tingling, sensory change, speech change, focal weakness, weakness and headaches. Psychiatric/Behavioral: Positive for depression. Negative for hallucinations, substance abuse and suicidal ideas. The patient is nervous/anxious. The patient does not have insomnia. Physical Exam  Vitals signs and nursing note reviewed. Constitutional:       General: She is not in acute distress. Appearance: She is well-developed. She is not diaphoretic. HENT:      Head: Normocephalic and atraumatic. Mouth/Throat:      Pharynx: No oropharyngeal exudate. Eyes:      General: No scleral icterus. Right eye: No discharge. Left eye: No discharge. Conjunctiva/sclera: Conjunctivae normal.      Pupils: Pupils are equal, round, and reactive to light. Neck:      Thyroid: No thyromegaly. Cardiovascular:      Rate and Rhythm: Normal rate and regular rhythm. Heart sounds: Normal heart sounds. No murmur. Pulmonary:      Effort: Pulmonary effort is normal. No respiratory distress. Breath sounds: Normal breath sounds. No wheezing or rales. Chest:      Chest wall: No tenderness. Abdominal:      General: There is no distension. Palpations: Abdomen is soft. Tenderness: There is no tenderness. There is no rebound. Genitourinary:     Comments: The lower end of the labia majora there is a small palpable lesions possible irritated ingrown hairs there is no ulcers no blisters, but slightly tender to touch no discharge  Musculoskeletal: Normal range of motion. General: No tenderness or deformity. Lymphadenopathy:      Cervical: No cervical adenopathy. Skin:     General: Skin is warm and dry. Coloration: Skin is not pale. Findings: No erythema or rash. Neurological:      Mental Status: She is alert and oriented to person, place, and time. Cranial Nerves: No cranial nerve deficit. Coordination: Coordination normal.      Comments: Patient looks anxious sad and in tears for some part of consultation   Psychiatric:         Behavior: Behavior normal.         Thought Content: Thought content normal.         Judgment: Judgment normal.          Assessment and plan     Plan of care has been discussed with the patient, he agrees to the plan and verbalized understanding. All his questions were answered  More than 50% of the time spent in this visit was counseling the patient about  illness and treatment options         1. Controlled type 2 diabetes mellitus with complication, without long-term current use of insulin ([address]  She is on metformin 500 mg twice daily well controlled last hemoglobin A1c 6.2    2. Mixed hyperlipidemia  Patient is not on any statin, to consider statin in the near future    3. Essential hypertension  Well-controlled on current medications    4. Severe obesity ([address]  Patient understand that she need to change her lifestyle but due to her current stress she is not able to    5. DAVID (generalized anxiety disorder)  Start patient on Zoloft 25 mg daily after 7 days should increase the dose to 2 tablets daily  - sertraline (ZOLOFT) 25 mg tablet; Take 1 Tab by mouth daily. Can increase to 50 mg after 1 week  Dispense: 30 Tab; Refill: 0    6. Ingrown hair  Possible ingrown hair, advised the patient to apply antibiotic cream and if there is no improvement schedule follow-up    Current Outpatient Medications   Medication Sig Dispense Refill    sertraline (ZOLOFT) 25 mg tablet Take 1 Tab by mouth daily.  Can increase to 50 mg after 1 week 30 Tab 0    losartan (COZAAR) 100 mg tablet TAKE 1 TABLET BY MOUTH ONCE DAILY 90 Tab 0    metFORMIN (GLUCOPHAGE) 500 mg tablet Take 1 Tab by mouth two (2) times daily (with meals). 180 Tab 2    estradiol (ESTRACE) 0.5 mg tablet Take 1 Tab by mouth daily. 90 Tab 2       Patient Active Problem List    Diagnosis Date Noted    DAVID (generalized anxiety disorder) 12/16/2019    Controlled type 2 diabetes mellitus with complication, without long-term current use of insulin ([facility] 07/26/2019    Severe obesity ([facility] 12/14/2018    Mixed hyperlipidemia 12/14/2018    Cervical radiculopathy due to degenerative joint disease of spine 09/06/2017    Cervical radiculopathy 09/06/2017    HTN (hypertension) 06/01/2016     Results for orders placed or performed during the hospital encounter of 10/28/19   CBC WITH AUTOMATED DIFF   Result Value Ref Range    WBC 6.3 4.6 - 13.2 K/uL    RBC 4.18 (L) 4.20 - 5.30 M/uL    HGB 11.6 (L) 12.0 - 16.0 g/dL    HCT 38.8 35.0 - 45.0 %    MCV 92.8 74.0 - 97.0 FL    MCH 27.8 24.0 - 34.0 PG    MCHC 29.9 (L) 31.0 - 37.0 g/dL    RDW 14.2 11.6 - 14.5 %    PLATELET 837 589 - 934 K/uL    MPV 9.2 9.2 - 11.8 FL    NEUTROPHILS 57 40 - 73 %    LYMPHOCYTES 34 21 - 52 %    MONOCYTES 6 3 - 10 %    EOSINOPHILS 2 0 - 5 %    BASOPHILS 1 0 - 2 %    ABS. NEUTROPHILS 3.6 1.8 - 8.0 K/UL    ABS. LYMPHOCYTES 2.1 0.9 - 3.6 K/UL    ABS. MONOCYTES 0.4 0.05 - 1.2 K/UL    ABS. EOSINOPHILS 0.1 0.0 - 0.4 K/UL    ABS. BASOPHILS 0.0 0.0 - 0.1 K/UL    DF AUTOMATED     METABOLIC PANEL, COMPREHENSIVE   Result Value Ref Range    Sodium 142 136 - 145 mmol/L    Potassium 4.2 3.5 - 5.5 mmol/L    Chloride 106 100 - 111 mmol/L    CO2 29 21 - 32 mmol/L    Anion gap 7 3.0 - 18 mmol/L    Glucose 111 (H) 74 - 99 mg/dL    BUN 12 7.0 - 18 MG/DL    Creatinine 0.96 0.6 - 1.3 MG/DL    BUN/Creatinine ratio 13 12 - 20      GFR est AA >60 >60 ml/min/1.73m2    GFR est non-AA >60 >60 ml/min/1.73m2    Calcium 9.0 8.5 - 10.1 MG/DL    Bilirubin, total 0.3 0.2 - 1.0 MG/DL    ALT (SGPT) 23 13 - 56 U/L    AST (SGOT) 13 10 - 38 U/L    Alk.  phosphatase 59 45 - 117 U/L    Protein, total 7.4 6.4 - 8.2 g/dL    Albumin 4.1 3.4 - 5.0 g/dL    Globulin 3.3 2.0 - 4.0 g/dL    A-G Ratio 1.2 0.8 - 1.7     LIPID PANEL   Result Value Ref Range    LIPID PROFILE          Cholesterol, total 229 (H) <200 MG/DL    Triglyceride 122 <150 MG/DL    HDL Cholesterol 58 40 - 60 MG/DL    LDL, calculated 146.6 (H) 0 - 100 MG/DL    VLDL, calculated 24.4 MG/DL    CHOL/HDL Ratio 3.9 0 - 5.0     HEMOGLOBIN A1C W/O EAG   Result Value Ref Range    Hemoglobin A1c 6.2 (H) 4.2 - 5.6 %     Hospital Outpatient Visit on 10/28/2019   Component Date Value Ref Range Status    WBC 10/28/2019 6.3  4.6 - 13.2 K/uL Final    RBC 10/28/2019 4.18* 4.20 - 5.30 M/uL Final    HGB 10/28/2019 11.6* 12.0 - 16.0 g/dL Final    HCT 10/28/2019 38.8  35.0 - 45.0 % Final    MCV 10/28/2019 92.8  74.0 - 97.0 FL Final    MCH 10/28/2019 27.8  24.0 - 34.0 PG Final    MCHC 10/28/2019 29.9* 31.0 - 37.0 g/dL Final    RDW 10/28/2019 14.2  11.6 - 14.5 % Final    PLATELET 11/96/7064 844  135 - 420 K/uL Final    MPV 10/28/2019 9.2  9.2 - 11.8 FL Final    NEUTROPHILS 10/28/2019 57  40 - 73 % Final    LYMPHOCYTES 10/28/2019 34  21 - 52 % Final    MONOCYTES 10/28/2019 6  3 - 10 % Final    EOSINOPHILS 10/28/2019 2  0 - 5 % Final    BASOPHILS 10/28/2019 1  0 - 2 % Final    ABS. NEUTROPHILS 10/28/2019 3.6  1.8 - 8.0 K/UL Final    ABS. LYMPHOCYTES 10/28/2019 2.1  0.9 - 3.6 K/UL Final    ABS. MONOCYTES 10/28/2019 0.4  0.05 - 1.2 K/UL Final    ABS. EOSINOPHILS 10/28/2019 0.1  0.0 - 0.4 K/UL Final    ABS.  BASOPHILS 10/28/2019 0.0  0.0 - 0.1 K/UL Final    DF 10/28/2019 AUTOMATED    Final    Sodium 10/28/2019 142  136 - 145 mmol/L Final    Potassium 10/28/2019 4.2  3.5 - 5.5 mmol/L Final    Chloride 10/28/2019 106  100 - 111 mmol/L Final    CO2 10/28/2019 29  21 - 32 mmol/L Final    Anion gap 10/28/2019 7  3.0 - 18 mmol/L Final    Glucose 10/28/2019 111* 74 - 99 mg/dL Final    BUN 10/28/2019 12  7.0 - 18 MG/DL Final    Creatinine 10/28/2019 0.96  0.6 - 1.3 MG/DL Final    BUN/Creatinine ratio 10/28/2019 13  12 - 20   Final    GFR est AA 10/28/2019 >60  >60 ml/min/1.73m2 Final    GFR est non-AA 10/28/2019 >60  >60 ml/min/1.73m2 Final    Comment: (NOTE)  Estimated GFR is calculated using the Modification of Diet in Renal   Disease (MDRD) Study equation, reported for both  Americans   (GFRAA) and non- Americans (GFRNA), and normalized to 1.73m2   body surface area. The physician must decide which value applies to   the patient. The MDRD study equation should only be used in   individuals age 25 or older. It has not been validated for the   following: pregnant women, patients with serious comorbid conditions,   or on certain medications, or persons with extremes of body size,   muscle mass, or nutritional status.  Calcium 10/28/2019 9.0  8.5 - 10.1 MG/DL Final    Bilirubin, total 10/28/2019 0.3  0.2 - 1.0 MG/DL Final    ALT (SGPT) 10/28/2019 23  13 - 56 U/L Final    AST (SGOT) 10/28/2019 13  10 - 38 U/L Final    Alk. phosphatase 10/28/2019 59  45 - 117 U/L Final    Protein, total 10/28/2019 7.4  6.4 - 8.2 g/dL Final    Albumin 10/28/2019 4.1  3.4 - 5.0 g/dL Final    Globulin 10/28/2019 3.3  2.0 - 4.0 g/dL Final    A-G Ratio 10/28/2019 1.2  0.8 - 1.7   Final    LIPID PROFILE 10/28/2019        Final    Cholesterol, total 10/28/2019 229* <200 MG/DL Final    Triglyceride 10/28/2019 122  <150 MG/DL Final    Comment: The drugs N-acetylcysteine (NAC) and  Metamiszole have been found to cause falsely  low results in this chemical assay. Please  be sure to submit blood samples obtained  BEFORE administration of either of these  drugs to assure correct results.       HDL Cholesterol 10/28/2019 58  40 - 60 MG/DL Final    LDL, calculated 10/28/2019 146.6* 0 - 100 MG/DL Final    VLDL, calculated 10/28/2019 24.4  MG/DL Final    CHOL/HDL Ratio 10/28/2019 3.9  0 - 5.0   Final    Hemoglobin A1c 10/28/2019 6.2* 4.2 - 5.6 % Final    Comment: (NOTE)  HbA1C Interpretive Ranges  <5.7              Normal  5.7 - 6.4         Consider Prediabetes  >6.5              Consider Diabetes            Follow-up and Dispositions    · Return in about 1 month (around 1/16/2020), or if symptoms worsen or fail to improve, for as needed.

## 2019-12-16 NOTE — PROGRESS NOTES
Candi Bowie presents today for   Chief Complaint   Patient presents with    Vaginal Itching     x 24 hours       Is someone accompanying this pt? no    Is the patient using any DME equipment during OV? no    Depression Screening:  3 most recent PHQ Screens 10/28/2019   Little interest or pleasure in doing things Several days   Feeling down, depressed, irritable, or hopeless Not at all   Total Score PHQ 2 1       Learning Assessment:  Learning Assessment 10/28/2019   PRIMARY LEARNER Patient   PRIMARY LANGUAGE ENGLISH   LEARNER PREFERENCE PRIMARY PICTURES     LISTENING     READING     DEMONSTRATION     VIDEOS   ANSWERED BY patient   RELATIONSHIP SELF       Abuse Screening:  Abuse Screening Questionnaire 10/28/2019   Do you ever feel afraid of your partner? N   Are you in a relationship with someone who physically or mentally threatens you? N   Is it safe for you to go home? Y       Fall Risk  Fall Risk Assessment, last 12 mths 10/28/2019   Able to walk? Yes   Fall in past 12 months? No       Health Maintenance reviewed and discussed and ordered per Provider. Health Maintenance Due   Topic Date Due    FOOT EXAM Q1  05/14/1973    Shingrix Vaccine Age 50> (2 of 2) 07/28/2019   . Pt currently taking Antiplatelet therapy? no    Coordination of Care:  1. Have you been to the ER, urgent care clinic since your last visit? Hospitalized since your last visit? no    2. Have you seen or consulted any other health care providers outside of the [address] [place] [address] since your last visit? Include any pap smears or colon screening.  no

## 2019-12-17 ENCOUNTER — TELEPHONE (OUTPATIENT)
Dept: FAMILY MEDICINE CLINIC | Age: 54
End: 2019-12-17

## 2019-12-17 DIAGNOSIS — L08.9 SKIN INFECTION: Primary | ICD-10-CM

## 2019-12-17 NOTE — TELEPHONE ENCOUNTER
Patient call in frustrated that she was to have an \"abx\" called in yesterday but pharmacy did not receive. Note states \"possible ingrown hair and to apply abx cream\" but unsure if this was to be a rx. Please advise.

## 2020-01-31 NOTE — LETTER
NOTIFICATION RETURN TO WORK / SCHOOL 
 
2/15/2018 2:43 PM 
 
Ms. Candi Marshall [id number] [address] [id number] To Whom It May Concern: 
 
Candi Marshall is currently under the care of [address] [place] [address]. She will return to work/school on: February 18, 2018. Please excuse from work February 15, 2018 till the 18th. If there are questions or concerns please have the patient contact our office. Sincerely, Letha Florez MD 
 
                                
 
 English

## 2020-02-21 DIAGNOSIS — I10 ESSENTIAL HYPERTENSION: ICD-10-CM

## 2020-02-21 RX ORDER — LOSARTAN POTASSIUM 100 MG/1
TABLET ORAL
Qty: 90 TAB | Refills: 0 | Status: SHIPPED | OUTPATIENT
Start: 2020-02-21 | End: 2020-05-18

## 2020-02-21 NOTE — TELEPHONE ENCOUNTER
LVM notifying patient rx sent but due for a follow up appt. Number left for patient to call to schedule appt.

## 2020-03-09 ENCOUNTER — OFFICE VISIT (OUTPATIENT)
Dept: FAMILY MEDICINE CLINIC | Age: 55
End: 2020-03-09

## 2020-03-09 ENCOUNTER — HOSPITAL ENCOUNTER (OUTPATIENT)
Dept: LAB | Age: 55
Discharge: HOME | End: 2020-03-09
Payer: COMMERCIAL

## 2020-03-09 VITALS
RESPIRATION RATE: 18 BRPM | HEIGHT: 64 IN | TEMPERATURE: 99.3 F | DIASTOLIC BLOOD PRESSURE: 77 MMHG | WEIGHT: 215.4 LBS | BODY MASS INDEX: 36.77 KG/M2 | OXYGEN SATURATION: 97 % | SYSTOLIC BLOOD PRESSURE: 144 MMHG | HEART RATE: 82 BPM

## 2020-03-09 DIAGNOSIS — E11.8 CONTROLLED TYPE 2 DIABETES MELLITUS WITH COMPLICATION, WITHOUT LONG-TERM CURRENT USE OF INSULIN (HCC): ICD-10-CM

## 2020-03-09 DIAGNOSIS — I10 ESSENTIAL HYPERTENSION: ICD-10-CM

## 2020-03-09 DIAGNOSIS — E78.2 MIXED HYPERLIPIDEMIA: ICD-10-CM

## 2020-03-09 DIAGNOSIS — E78.2 MIXED HYPERLIPIDEMIA: Primary | ICD-10-CM

## 2020-03-09 LAB
ALBUMIN SERPL-MCNC: 3.6 G/DL (ref 3.4–5)
ALBUMIN/GLOB SERPL: 1 {RATIO} (ref 0.8–1.7)
ALP SERPL-CCNC: 58 U/L (ref 45–117)
ALT SERPL-CCNC: 23 U/L (ref 13–56)
ANION GAP SERPL CALC-SCNC: 4 MMOL/L (ref 3–18)
AST SERPL-CCNC: 14 U/L (ref 10–38)
BASOPHILS # BLD: 0 K/UL (ref 0–0.1)
BASOPHILS NFR BLD: 0 % (ref 0–2)
BILIRUB SERPL-MCNC: 0.3 MG/DL (ref 0.2–1)
BUN SERPL-MCNC: 11 MG/DL (ref 7–18)
BUN/CREAT SERPL: 13 (ref 12–20)
CALCIUM SERPL-MCNC: 9.1 MG/DL (ref 8.5–10.1)
CHLORIDE SERPL-SCNC: 108 MMOL/L (ref 100–111)
CHOLEST SERPL-MCNC: 208 MG/DL
CO2 SERPL-SCNC: 31 MMOL/L (ref 21–32)
CREAT SERPL-MCNC: 0.87 MG/DL (ref 0.6–1.3)
DIFFERENTIAL METHOD BLD: ABNORMAL
EOSINOPHIL # BLD: 0.2 K/UL (ref 0–0.4)
EOSINOPHIL NFR BLD: 3 % (ref 0–5)
ERYTHROCYTE [DISTWIDTH] IN BLOOD BY AUTOMATED COUNT: 14.2 % (ref 11.6–14.5)
GLOBULIN SER CALC-MCNC: 3.7 G/DL (ref 2–4)
GLUCOSE SERPL-MCNC: 107 MG/DL (ref 74–99)
HCT VFR BLD AUTO: 38 % (ref 35–45)
HDLC SERPL-MCNC: 57 MG/DL (ref 40–60)
HDLC SERPL: 3.6 {RATIO} (ref 0–5)
HGB BLD-MCNC: 11.7 G/DL (ref 12–16)
LDLC SERPL CALC-MCNC: 124.6 MG/DL (ref 0–100)
LIPID PROFILE,FLP: ABNORMAL
LYMPHOCYTES # BLD: 2.3 K/UL (ref 0.9–3.6)
LYMPHOCYTES NFR BLD: 38 % (ref 21–52)
MCH RBC QN AUTO: 27.9 PG (ref 24–34)
MCHC RBC AUTO-ENTMCNC: 30.8 G/DL (ref 31–37)
MCV RBC AUTO: 90.5 FL (ref 74–97)
MONOCYTES # BLD: 0.4 K/UL (ref 0.05–1.2)
MONOCYTES NFR BLD: 7 % (ref 3–10)
NEUTS SEG # BLD: 3.1 K/UL (ref 1.8–8)
NEUTS SEG NFR BLD: 52 % (ref 40–73)
PLATELET # BLD AUTO: 332 K/UL (ref 135–420)
PMV BLD AUTO: 9.9 FL (ref 9.2–11.8)
POTASSIUM SERPL-SCNC: 3.9 MMOL/L (ref 3.5–5.5)
PROT SERPL-MCNC: 7.3 G/DL (ref 6.4–8.2)
RBC # BLD AUTO: 4.2 M/UL (ref 4.2–5.3)
SODIUM SERPL-SCNC: 143 MMOL/L (ref 136–145)
TRIGL SERPL-MCNC: 132 MG/DL (ref ?–150)
VLDLC SERPL CALC-MCNC: 26.4 MG/DL
WBC # BLD AUTO: 6 K/UL (ref 4.6–13.2)

## 2020-03-09 PROCEDURE — 36415 COLL VENOUS BLD VENIPUNCTURE: CPT

## 2020-03-09 PROCEDURE — 85025 COMPLETE CBC W/AUTO DIFF WBC: CPT

## 2020-03-09 PROCEDURE — 80061 LIPID PANEL: CPT

## 2020-03-09 PROCEDURE — 80053 COMPREHEN METABOLIC PANEL: CPT

## 2020-03-09 NOTE — PROGRESS NOTES
Candi Rjaput Paty presents today for   Chief Complaint   Patient presents with    Hypertension    Diabetes    Cholesterol Problem       Is someone accompanying this pt? no    Is the patient using any DME equipment during OV? no    Depression Screening:  3 most recent PHQ Screens 10/28/2019   Little interest or pleasure in doing things Several days   Feeling down, depressed, irritable, or hopeless Not at all   Total Score PHQ 2 1       Learning Assessment:  Learning Assessment 10/28/2019   PRIMARY LEARNER Patient   PRIMARY LANGUAGE ENGLISH   LEARNER PREFERENCE PRIMARY PICTURES     LISTENING     READING     DEMONSTRATION     VIDEOS   ANSWERED BY patient   RELATIONSHIP SELF       Abuse Screening:  Abuse Screening Questionnaire 10/28/2019   Do you ever feel afraid of your partner? N   Are you in a relationship with someone who physically or mentally threatens you? N   Is it safe for you to go home? Y       Fall Risk  Fall Risk Assessment, last 12 mths 10/28/2019   Able to walk? Yes   Fall in past 12 months? No       Health Maintenance reviewed and discussed and ordered per Provider. Health Maintenance Due   Topic Date Due    Foot Exam Q1  05/14/1975    Shingrix Vaccine Age 50> (2 of 2) 07/28/2019       Pt currently taking Antiplatelet therapy? no    Coordination of Care:  1. Have you been to the ER, urgent care clinic since your last visit? Hospitalized since your last visit? no    2. Have you seen or consulted any other health care providers outside of the [address] [place] [address] since your last visit? Include any pap smears or colon screening.  no

## 2020-03-09 NOTE — PROGRESS NOTES
[address] [place] [address]     Chief Complaint   Patient presents with    Hypertension    Diabetes    Cholesterol Problem     Vitals:    03/09/20 0702   BP: 144/77   Pulse: 82   Resp: 18   Temp: 99.3 °F (37.4 °C)   TempSrc: Oral   SpO2: 97%   Weight: 215 lb 6.4 oz (97.7 kg)   Height: 5' 4\" (1.626 m)   PainSc:   0 - No pain         HPI:  is here for follow up on hypertension hyperlipidemia, she has been doing well no acute complaints, her anxiety and stress is much better now, she was prescribed Zoloft 1 has not taken it for long as she stopped taking it now. She is trying with lifestyle modifications, her weight has increased 5 pounds since last visit 3 months ago, she is not quite adherent to diet and exercising. Past Medical History:   Diagnosis Date    Gynecological disorder     fibroids and low test.     Hypertension     Neck pain      Past Surgical History:   Procedure Laterality Date    HX GYN      HX HYSTERECTOMY      fibroids     HX HYSTERECTOMY      removal of ovaries     Social History     Tobacco Use    Smoking status: Never Smoker    Smokeless tobacco: Never Used   Substance Use Topics    Alcohol use: Yes     Comment: socailly        Family History   Problem Relation Age of Onset    Hypertension Mother     High Cholesterol Mother     No Known Problems Father        Review of Systems   Constitutional: Negative for chills, fever, malaise/fatigue and weight loss. HENT: Negative for congestion, ear discharge, ear pain, hearing loss, nosebleeds, sinus pain and sore throat. Eyes: Negative for blurred vision, double vision and discharge. Respiratory: Negative for cough, hemoptysis, sputum production and shortness of breath. Cardiovascular: Negative for chest pain, palpitations, claudication and leg swelling. Gastrointestinal: Negative for abdominal pain, constipation, diarrhea, nausea and vomiting. Genitourinary: Negative for dysuria, frequency and urgency. Musculoskeletal: Negative for back pain, joint pain, myalgias and neck pain. Skin: Negative for itching and rash. Neurological: Negative for dizziness, tingling, sensory change, speech change, focal weakness, weakness and headaches. Psychiatric/Behavioral: Negative for depression, hallucinations, substance abuse and suicidal ideas. The patient has insomnia. The patient is not nervous/anxious. Physical Exam  Constitutional:       General: She is not in acute distress. Appearance: She is well-developed. She is not diaphoretic. HENT:      Head: Normocephalic and atraumatic. Mouth/Throat:      Pharynx: No oropharyngeal exudate. Eyes:      General: No scleral icterus. Right eye: No discharge. Left eye: No discharge. Conjunctiva/sclera: Conjunctivae normal.      Pupils: Pupils are equal, round, and reactive to light. Neck:      Thyroid: No thyromegaly. Cardiovascular:      Rate and Rhythm: Normal rate and regular rhythm. Heart sounds: Normal heart sounds. Pulmonary:      Effort: Pulmonary effort is normal. No respiratory distress. Breath sounds: Normal breath sounds. No wheezing or rales. Chest:      Chest wall: No tenderness. Abdominal:      General: There is no distension. Palpations: Abdomen is soft. Tenderness: There is no abdominal tenderness. There is no rebound. Musculoskeletal: Normal range of motion. General: No tenderness or deformity. Lymphadenopathy:      Cervical: No cervical adenopathy. Skin:     General: Skin is warm and dry. Coloration: Skin is not pale. Findings: No erythema or rash. Neurological:      Mental Status: She is alert and oriented to person, place, and time. Cranial Nerves: No cranial nerve deficit. Coordination: Coordination normal.   Psychiatric:         Behavior: Behavior normal.         Thought Content:  Thought content normal.         Judgment: Judgment normal. Assessment and plan     Plan of care has been discussed with the patient, he agrees to the plan and verbalized understanding. All his questions were answered  More than 50% of the time spent in this visit was counseling the patient about  illness and treatment options         1. Essential hypertension  Well-controlled continue same medications  - METABOLIC PANEL, COMPREHENSIVE; Future  - LIPID PANEL; Future  - CBC WITH AUTOMATED DIFF; Future    2. Controlled type 2 diabetes mellitus with complication, without long-term current use of insulin (HCC)  Recheck hemoglobin E5Q  - METABOLIC PANEL, COMPREHENSIVE; Future  - AMB POC HEMOGLOBIN A1C  - LIPID PANEL; Future  - CBC WITH AUTOMATED DIFF; Future    3. Mixed hyperlipidemia  Patient is not on statin I have discussed with her that she has diabetes hypertension and hyperlipidemia will put her at increased risk for coronary disease and strokes, patient declines statin await formal lipid results  - METABOLIC PANEL, COMPREHENSIVE; Future  - LIPID PANEL; Future  - CBC WITH AUTOMATED DIFF; Future    4. BMI 36.0-36.9,adult  Lifestyle modification has been emphasized and discussed with patient    Current Outpatient Medications   Medication Sig Dispense Refill    losartan (COZAAR) 100 mg tablet TAKE 1 TABLET BY MOUTH ONCE DAILY 90 Tab 0    metFORMIN (GLUCOPHAGE) 500 mg tablet Take 1 Tab by mouth two (2) times daily (with meals). 180 Tab 2    estradiol (ESTRACE) 0.5 mg tablet Take 1 Tab by mouth daily.  90 Tab 2       Patient Active Problem List    Diagnosis Date Noted    DAVID (generalized anxiety disorder) 12/16/2019    Controlled type 2 diabetes mellitus with complication, without long-term current use of insulin ([facility] [address] 07/26/2019    Severe obesity ([facility] [address] 12/14/2018    Mixed hyperlipidemia 12/14/2018    Cervical radiculopathy due to degenerative joint disease of spine 09/06/2017    Cervical radiculopathy 09/06/2017    HTN (hypertension) 06/01/2016     Results for orders placed or performed during the hospital encounter of 10/28/19   CBC WITH AUTOMATED DIFF   Result Value Ref Range    WBC 6.3 4.6 - 13.2 K/uL    RBC 4.18 (L) 4.20 - 5.30 M/uL    HGB 11.6 (L) 12.0 - 16.0 g/dL    HCT 38.8 35.0 - 45.0 %    MCV 92.8 74.0 - 97.0 FL    MCH 27.8 24.0 - 34.0 PG    MCHC 29.9 (L) 31.0 - 37.0 g/dL    RDW 14.2 11.6 - 14.5 %    PLATELET 516 338 - 400 K/uL    MPV 9.2 9.2 - 11.8 FL    NEUTROPHILS 57 40 - 73 %    LYMPHOCYTES 34 21 - 52 %    MONOCYTES 6 3 - 10 %    EOSINOPHILS 2 0 - 5 %    BASOPHILS 1 0 - 2 %    ABS. NEUTROPHILS 3.6 1.8 - 8.0 K/UL    ABS. LYMPHOCYTES 2.1 0.9 - 3.6 K/UL    ABS. MONOCYTES 0.4 0.05 - 1.2 K/UL    ABS. EOSINOPHILS 0.1 0.0 - 0.4 K/UL    ABS. BASOPHILS 0.0 0.0 - 0.1 K/UL    DF AUTOMATED     METABOLIC PANEL, COMPREHENSIVE   Result Value Ref Range    Sodium 142 136 - 145 mmol/L    Potassium 4.2 3.5 - 5.5 mmol/L    Chloride 106 100 - 111 mmol/L    CO2 29 21 - 32 mmol/L    Anion gap 7 3.0 - 18 mmol/L    Glucose 111 (H) 74 - 99 mg/dL    BUN 12 7.0 - 18 MG/DL    Creatinine 0.96 0.6 - 1.3 MG/DL    BUN/Creatinine ratio 13 12 - 20      GFR est AA >60 >60 ml/min/1.73m2    GFR est non-AA >60 >60 ml/min/1.73m2    Calcium 9.0 8.5 - 10.1 MG/DL    Bilirubin, total 0.3 0.2 - 1.0 MG/DL    ALT (SGPT) 23 13 - 56 U/L    AST (SGOT) 13 10 - 38 U/L    Alk.  phosphatase 59 45 - 117 U/L    Protein, total 7.4 6.4 - 8.2 g/dL    Albumin 4.1 3.4 - 5.0 g/dL    Globulin 3.3 2.0 - 4.0 g/dL    A-G Ratio 1.2 0.8 - 1.7     LIPID PANEL   Result Value Ref Range    LIPID PROFILE          Cholesterol, total 229 (H) <200 MG/DL    Triglyceride 122 <150 MG/DL    HDL Cholesterol 58 40 - 60 MG/DL    LDL, calculated 146.6 (H) 0 - 100 MG/DL    VLDL, calculated 24.4 MG/DL    CHOL/HDL Ratio 3.9 0 - 5.0     HEMOGLOBIN A1C W/O EAG   Result Value Ref Range    Hemoglobin A1c 6.2 (H) 4.2 - 5.6 %     Hospital Outpatient Visit on 03/09/2020   Component Date Value Ref Range Status    Sodium 03/09/2020 143  136 - 145 mmol/L Final    Potassium 03/09/2020 3.9  3.5 - 5.5 mmol/L Final    Chloride 03/09/2020 108  100 - 111 mmol/L Final    CO2 03/09/2020 31  21 - 32 mmol/L Final    Anion gap 03/09/2020 4  3.0 - 18 mmol/L Final    Glucose 03/09/2020 107* 74 - 99 mg/dL Final    BUN 03/09/2020 11  7.0 - 18 MG/DL Final    Creatinine 03/09/2020 0.87  0.6 - 1.3 MG/DL Final    BUN/Creatinine ratio 03/09/2020 13  12 - 20   Final    GFR est AA 03/09/2020 >60  >60 ml/min/1.73m2 Final    GFR est non-AA 03/09/2020 >60  >60 ml/min/1.73m2 Final    Comment: (NOTE)  Estimated GFR is calculated using the Modification of Diet in Renal   Disease (MDRD) Study equation, reported for both  Americans   (GFRAA) and non- Americans (GFRNA), and normalized to 1.73m2   body surface area. The physician must decide which value applies to   the patient. The MDRD study equation should only be used in   individuals age 25 or older. It has not been validated for the   following: pregnant women, patients with serious comorbid conditions,   or on certain medications, or persons with extremes of body size,   muscle mass, or nutritional status.  Calcium 03/09/2020 9.1  8.5 - 10.1 MG/DL Final    Bilirubin, total 03/09/2020 0.3  0.2 - 1.0 MG/DL Final    ALT (SGPT) 03/09/2020 23  13 - 56 U/L Final    AST (SGOT) 03/09/2020 14  10 - 38 U/L Final    Alk. phosphatase 03/09/2020 58  45 - 117 U/L Final    Protein, total 03/09/2020 7.3  6.4 - 8.2 g/dL Final    Albumin 03/09/2020 3.6  3.4 - 5.0 g/dL Final    Globulin 03/09/2020 3.7  2.0 - 4.0 g/dL Final    A-G Ratio 03/09/2020 1.0  0.8 - 1.7   Final    LIPID PROFILE 03/09/2020        Final    Cholesterol, total 03/09/2020 208* <200 MG/DL Final    Triglyceride 03/09/2020 132  <150 MG/DL Final    Comment: The drugs N-acetylcysteine (NAC) and  Metamiszole have been found to cause falsely  low results in this chemical assay.  Please  be sure to submit blood samples obtained  BEFORE administration of either of these  drugs to assure correct results.  HDL Cholesterol 03/09/2020 57  40 - 60 MG/DL Final    LDL, calculated 03/09/2020 124.6* 0 - 100 MG/DL Final    VLDL, calculated 03/09/2020 26.4  MG/DL Final    CHOL/HDL Ratio 03/09/2020 3.6  0 - 5.0   Final    WBC 03/09/2020 6.0  4.6 - 13.2 K/uL Final    RBC 03/09/2020 4.20  4. 20 - 5.30 M/uL Final    HGB 03/09/2020 11.7* 12.0 - 16.0 g/dL Final    HCT 03/09/2020 38.0  35.0 - 45.0 % Final    MCV 03/09/2020 90.5  74.0 - 97.0 FL Final    MCH 03/09/2020 27.9  24.0 - 34.0 PG Final    MCHC 03/09/2020 30.8* 31.0 - 37.0 g/dL Final    RDW 03/09/2020 14.2  11.6 - 14.5 % Final    PLATELET 70/34/6832 479  135 - 420 K/uL Final    MPV 03/09/2020 9.9  9.2 - 11.8 FL Final    NEUTROPHILS 03/09/2020 52  40 - 73 % Final    LYMPHOCYTES 03/09/2020 38  21 - 52 % Final    MONOCYTES 03/09/2020 7  3 - 10 % Final    EOSINOPHILS 03/09/2020 3  0 - 5 % Final    BASOPHILS 03/09/2020 0  0 - 2 % Final    ABS. NEUTROPHILS 03/09/2020 3.1  1.8 - 8.0 K/UL Final    ABS. LYMPHOCYTES 03/09/2020 2.3  0.9 - 3.6 K/UL Final    ABS. MONOCYTES 03/09/2020 0.4  0.05 - 1.2 K/UL Final    ABS. EOSINOPHILS 03/09/2020 0.2  0.0 - 0.4 K/UL Final    ABS. BASOPHILS 03/09/2020 0.0  0.0 - 0.1 K/UL Final    DF 03/09/2020 AUTOMATED    Final          Follow-up and Dispositions    · Return in about 3 months (around 6/9/2020).

## 2020-03-16 NOTE — PROGRESS NOTES
Patient contacted with results per PCP, verified 2 patient identifiers. She has no further questions or concerns at this time. She states she will try to find time to come in for A1C. She states this is very inconvenient for her and was told this would be done with all her labs. I apologized to patient.

## 2020-04-02 ENCOUNTER — HOSPITAL ENCOUNTER (OUTPATIENT)
Dept: LAB | Age: 55
Discharge: HOME | End: 2020-04-02
Payer: COMMERCIAL

## 2020-04-02 DIAGNOSIS — E11.8 CONTROLLED TYPE 2 DIABETES MELLITUS WITH COMPLICATION, WITHOUT LONG-TERM CURRENT USE OF INSULIN (HCC): ICD-10-CM

## 2020-04-02 LAB — HBA1C MFR BLD: 5.9 % (ref 4.2–5.6)

## 2020-04-02 PROCEDURE — 83036 HEMOGLOBIN GLYCOSYLATED A1C: CPT

## 2020-04-02 PROCEDURE — 36415 COLL VENOUS BLD VENIPUNCTURE: CPT

## 2020-05-16 DIAGNOSIS — I10 ESSENTIAL HYPERTENSION: ICD-10-CM

## 2020-05-18 RX ORDER — LOSARTAN POTASSIUM 100 MG/1
TABLET ORAL
Qty: 90 TAB | Refills: 0 | Status: SHIPPED | OUTPATIENT
Start: 2020-05-18 | End: 2020-08-29

## 2020-06-06 ENCOUNTER — HOSPITAL ENCOUNTER (OUTPATIENT)
Dept: MAMMOGRAPHY | Age: 55
Discharge: HOME | End: 2020-06-06
Attending: LEGAL MEDICINE
Payer: COMMERCIAL

## 2020-06-06 DIAGNOSIS — Z12.31 VISIT FOR SCREENING MAMMOGRAM: ICD-10-CM

## 2020-06-06 PROCEDURE — 77063 BREAST TOMOSYNTHESIS BI: CPT

## 2020-06-12 DIAGNOSIS — R92.8 ABNORMAL MAMMOGRAM OF LEFT BREAST: Primary | ICD-10-CM

## 2020-06-12 NOTE — PROGRESS NOTES
Dr. Matt Choudhury aware to order recommendations: Diagnostic mammogram in 2-D/3-D with spot compression views and  possible ultrasound

## 2020-06-15 ENCOUNTER — TELEPHONE (OUTPATIENT)
Dept: FAMILY MEDICINE CLINIC | Age: 55
End: 2020-06-15

## 2020-06-15 NOTE — TELEPHONE ENCOUNTER
Patient called into day with leg cramps. She stated she woke up one day and felt a pop in her left leg between the ankle and calf. Now she has a hard knot there, hurt to the touch. She would like to know what she should do. We switched her appt until August but she can't wait that long. She is aware we are doing virtual visits. Please advise.

## 2020-06-16 ENCOUNTER — OFFICE VISIT (OUTPATIENT)
Dept: FAMILY MEDICINE CLINIC | Age: 55
End: 2020-06-16

## 2020-06-16 VITALS
HEIGHT: 64 IN | OXYGEN SATURATION: 99 % | DIASTOLIC BLOOD PRESSURE: 100 MMHG | SYSTOLIC BLOOD PRESSURE: 138 MMHG | TEMPERATURE: 97.4 F | WEIGHT: 215 LBS | HEART RATE: 91 BPM | BODY MASS INDEX: 36.7 KG/M2 | RESPIRATION RATE: 16 BRPM

## 2020-06-16 DIAGNOSIS — I10 ESSENTIAL HYPERTENSION: ICD-10-CM

## 2020-06-16 DIAGNOSIS — E11.8 CONTROLLED TYPE 2 DIABETES MELLITUS WITH COMPLICATION, WITHOUT LONG-TERM CURRENT USE OF INSULIN (HCC): ICD-10-CM

## 2020-06-16 DIAGNOSIS — M79.605 LEFT LEG PAIN: Primary | ICD-10-CM

## 2020-06-16 DIAGNOSIS — T14.8XXA SPRAIN AND STRAIN: ICD-10-CM

## 2020-06-16 NOTE — PROGRESS NOTES
[address] [place] [address]     Chief Complaint   Patient presents with    Leg Pain     cramp with a knot     Vitals:    06/16/20 1452 06/16/20 1600   BP: (!) 171/105 (!) 138/100   Pulse: 91    Resp: 16    Temp: 97.4 °F (36.3 °C)    TempSrc: Oral    SpO2: 99%    Weight: 215 lb (97.5 kg)    Height: 5' 4\" (1.626 m)    PainSc:   6    PainLoc: Leg          HPI:pateint is here complaining about left lower leg pain started 8 days ago when she got up at night to use bathroom when she heard a pop in her left lower leg and then it was very painful when she woke up in the morning was slightly swollen and painful, she is able to bear weight and walk but she still feels pain in that area, is about mild to moderate    Able to walk but swelling remains with pain she has tried cold and warm compression, with some mild to moderate relief        Past Medical History:   Diagnosis Date    Gynecological disorder     fibroids and low test.     Hypertension     Neck pain      Past Surgical History:   Procedure Laterality Date    HX GYN      HX HYSTERECTOMY      fibroids     HX HYSTERECTOMY      removal of ovaries     Social History     Tobacco Use    Smoking status: Never Smoker    Smokeless tobacco: Never Used   Substance Use Topics    Alcohol use: Yes     Comment: socailly        Family History   Problem Relation Age of Onset    Hypertension Mother     High Cholesterol Mother     No Known Problems Father        Review of Systems   Constitutional: Negative for chills, fever, malaise/fatigue and weight loss. HENT: Negative for congestion, ear discharge, ear pain, hearing loss, nosebleeds, sinus pain and sore throat. Eyes: Negative for blurred vision, double vision and discharge. Respiratory: Negative for cough, hemoptysis, sputum production, shortness of breath and wheezing. Cardiovascular: Negative for chest pain, palpitations, claudication and leg swelling.    Gastrointestinal: Negative for abdominal pain, constipation, diarrhea, nausea and vomiting. Genitourinary: Negative for dysuria, frequency and urgency. Musculoskeletal: Positive for myalgias. Skin: Negative for itching and rash. Neurological: Negative for dizziness, tingling, sensory change, speech change, focal weakness, weakness and headaches. Psychiatric/Behavioral: Negative for depression and suicidal ideas. Physical Exam  Constitutional:       General: She is not in acute distress. Appearance: She is well-developed. She is not diaphoretic. HENT:      Head: Normocephalic and atraumatic. Eyes:      General: No scleral icterus. Right eye: No discharge. Left eye: No discharge. Neck:      Thyroid: No thyromegaly. Cardiovascular:      Rate and Rhythm: Normal rate and regular rhythm. Heart sounds: Normal heart sounds. No murmur. Pulmonary:      Effort: Pulmonary effort is normal. No respiratory distress. Breath sounds: Normal breath sounds. No wheezing or rales. Chest:      Chest wall: No tenderness. Abdominal:      General: There is no distension. Palpations: Abdomen is soft. Tenderness: There is no abdominal tenderness. There is no rebound. Musculoskeletal: Normal range of motion. General: Tenderness present. No swelling or deformity. Comments: There is tenderness in the left foot above the ankle to the mid cough area, tenderness increases with pressure and with rotation and movement of the ankle joint   Lymphadenopathy:      Cervical: No cervical adenopathy. Skin:     General: Skin is warm and dry. Coloration: Skin is not pale. Findings: No erythema or rash. Neurological:      Mental Status: She is alert and oriented to person, place, and time. Cranial Nerves: No cranial nerve deficit. Coordination: Coordination normal.   Psychiatric:         Behavior: Behavior normal.         Thought Content:  Thought content normal.         Judgment: Judgment normal. Assessment and plan     Plan of care has been discussed with the patient, he agrees to the plan and verbalized understanding. All his questions were answered  More than 50% of the time spent in this visit was counseling the patient about  illness and treatment options         1. Controlled type 2 diabetes mellitus with complication, without long-term current use of insulin (HCC)    Well controlled     2. Essential hypertension  Blood pressure is elevated today continue monitoring blood pressures for the next 2 weeks  3. Sprain and strain    Discussed with patient she can use topical cream for pain relief    Advised about hands has been given    If not better consider physical therapy      Current Outpatient Medications   Medication Sig Dispense Refill    losartan (COZAAR) 100 mg tablet Take 1 tablet by mouth once daily 90 Tab 0    metFORMIN (GLUCOPHAGE) 500 mg tablet Take 1 Tab by mouth two (2) times daily (with meals). 180 Tab 2    estradiol (ESTRACE) 0.5 mg tablet Take 1 Tab by mouth daily.  90 Tab 2       Patient Active Problem List    Diagnosis Date Noted    DAVID (generalized anxiety disorder) 12/16/2019    Controlled type 2 diabetes mellitus with complication, without long-term current use of insulin ([facility] [address] 07/26/2019    Severe obesity ([facility] [address] 12/14/2018    Mixed hyperlipidemia 12/14/2018    Cervical radiculopathy due to degenerative joint disease of spine 09/06/2017    Cervical radiculopathy 09/06/2017    HTN (hypertension) 06/01/2016     Results for orders placed or performed during the hospital encounter of 04/02/20   HEMOGLOBIN A1C W/O EAG   Result Value Ref Range    Hemoglobin A1c 5.9 (H) 4.2 - 5.6 %     Hospital Outpatient Visit on 04/02/2020   Component Date Value Ref Range Status    Hemoglobin A1c 04/02/2020 5.9* 4.2 - 5.6 % Final    Comment: (NOTE)  HbA1C Interpretive Ranges  <5.7              Normal  5.7 - 6.4         Consider Prediabetes  >6.5              Consider Diabetes

## 2020-06-16 NOTE — PATIENT INSTRUCTIONS
Ankle: Exercises Introduction Here are some examples of exercises for you to try. The exercises may be suggested for a condition or for rehabilitation. Start each exercise slowly. Ease off the exercises if you start to have pain. You will be told when to start these exercises and which ones will work best for you. Alphabet exercise \"Alphabet\" exercise 1. Trace the alphabet with your toe. This helps your ankle move in all directions. Side-to-side knee swing exercise 1. Sit in a chair with your foot flat on the floor. 2. Slowly move your knee from side to side while keeping your foot pressed flat. 3. Continue this exercise for 2 to 3 minutes. Towel curl 1. While sitting, place your foot on a towel on the floor and scrunch the towel toward you with your toes. 2. Then use your toes to push the towel away from you. 3. Make this exercise more challenging by placing a weighted object, such as a soup can, on the other end of the towel. Towel stretch 1. Sit with your legs extended and knees straight. 2. Place a towel around your foot just under the toes. 3. Hold each end of the towel in each hand, with your hands above your knees. 4. Pull back with the towel so that your foot stretches toward you. 5. Hold the position for at least 15 to 30 seconds. 6. Repeat 2 to 4 times a session, up to 5 sessions a day. Ankle eversion exercise 1. Start by sitting with your foot flat on the floor and pushing it outward against an immovable object such as the wall or heavy furniture. Hold for about 6 seconds, then relax. Repeat 8 to 12 times. 2. After you feel comfortable with this, try using rubber tubing looped around the outside of your feet for resistance. Push your foot out to the side against the tubing, and then count to 10 as you slowly bring your foot back to the middle. Repeat 8 to 12 times. Isometric opposition exercises 1. While sitting, put your feet together flat on the floor. 2. Press your injured foot inward against your other foot. Hold for about 6 seconds, and relax. Repeat 8 to 12 times. 3. Then place the heel of your other foot on top of the injured one. Push down with the top heel while trying to push up with your injured foot. Hold for about 6 seconds, and relax. Repeat 8 to 12 times. Follow-up care is a key part of your treatment and safety. Be sure to make and go to all appointments, and call your doctor if you are having problems. It's also a good idea to know your test results and keep a list of the medicines you take. Where can you learn more? Go to http://charityTriplantoni.info/ Enter N554 in the search box to learn more about \"Ankle: Exercises. \" Current as of: March 2, 2020               Content Version: 12.5 © 0850-8964 [employer]. Care instructions adapted under license by [employer] (which disclaims liability or warranty for this information). If you have questions about a medical condition or this instruction, always ask your healthcare professional. [place] [address] any warranty or liability for your use of this information. Ankle: Exercises Introduction Here are some examples of exercises for you to try. The exercises may be suggested for a condition or for rehabilitation. Start each exercise slowly. Ease off the exercises if you start to have pain. You will be told when to start these exercises and which ones will work best for you. Alphabet exercise \"Alphabet\" exercise 2. Trace the alphabet with your toe. This helps your ankle move in all directions. Side-to-side knee swing exercise 4. Sit in a chair with your foot flat on the floor. 5. Slowly move your knee from side to side while keeping your foot pressed flat. 6. Continue this exercise for 2 to 3 minutes. Towel curl 4.  While sitting, place your foot on a towel on the floor and scrunch the towel toward you with your toes. 5. Then use your toes to push the towel away from you. 6. Make this exercise more challenging by placing a weighted object, such as a soup can, on the other end of the towel. Towel stretch 7. Sit with your legs extended and knees straight. 8. Place a towel around your foot just under the toes. 9. Hold each end of the towel in each hand, with your hands above your knees. 10. Pull back with the towel so that your foot stretches toward you. 11. Hold the position for at least 15 to 30 seconds. 12. Repeat 2 to 4 times a session, up to 5 sessions a day. Ankle eversion exercise 3. Start by sitting with your foot flat on the floor and pushing it outward against an immovable object such as the wall or heavy furniture. Hold for about 6 seconds, then relax. Repeat 8 to 12 times. 4. After you feel comfortable with this, try using rubber tubing looped around the outside of your feet for resistance. Push your foot out to the side against the tubing, and then count to 10 as you slowly bring your foot back to the middle. Repeat 8 to 12 times. Isometric opposition exercises 4. While sitting, put your feet together flat on the floor. 5. Press your injured foot inward against your other foot. Hold for about 6 seconds, and relax. Repeat 8 to 12 times. 6. Then place the heel of your other foot on top of the injured one. Push down with the top heel while trying to push up with your injured foot. Hold for about 6 seconds, and relax. Repeat 8 to 12 times. Follow-up care is a key part of your treatment and safety. Be sure to make and go to all appointments, and call your doctor if you are having problems. It's also a good idea to know your test results and keep a list of the medicines you take. Where can you learn more? Go to http://charity-antoni.info/ Enter F586 in the search box to learn more about \"Ankle: Exercises. \" 
 Current as of: March 2, 2020               Content Version: 12.5 © 6605-4324 [employer]. Care instructions adapted under license by [employer] (which disclaims liability or warranty for this information). If you have questions about a medical condition or this instruction, always ask your healthcare professional. [address] any warranty or liability for your use of this information.

## 2020-06-16 NOTE — PROGRESS NOTES
Candi Frey is a 60 y.o. female (: 1965) presenting to address:    Chief Complaint   Patient presents with    Leg Pain     cramp with a knot       Vitals:    20 1452 20 1600   BP: (!) 171/105 (!) 138/100   Pulse: 91    Resp: 16    Temp: 97.4 °F (36.3 °C)    TempSrc: Oral    SpO2: 99%    Weight: 215 lb (97.5 kg)    Height: 5' 4\" (1.626 m)    PainSc:   6    PainLoc: Leg        Hearing/Vision:   No exam data present    Learning Assessment:     Learning Assessment 10/28/2019   PRIMARY LEARNER Patient   PRIMARY LANGUAGE ENGLISH   LEARNER PREFERENCE PRIMARY PICTURES     LISTENING     READING     DEMONSTRATION     VIDEOS   ANSWERED BY patient   RELATIONSHIP SELF     Depression Screening:     3 most recent PHQ Screens 2020   Little interest or pleasure in doing things Not at all   Feeling down, depressed, irritable, or hopeless Not at all   Total Score PHQ 2 0     Fall Risk Assessment:     Fall Risk Assessment, last 12 mths 2020   Able to walk? Yes   Fall in past 12 months? No     Abuse Screening:     Abuse Screening Questionnaire 2020   Do you ever feel afraid of your partner? N   Are you in a relationship with someone who physically or mentally threatens you? N   Is it safe for you to go home? Y     Coordination of Care Questionaire:   1. Have you been to the ER, urgent care clinic since your last visit? Hospitalized since your last visit? NO    2. Have you seen or consulted any other health care providers outside of the [address] [place] [address] since your last visit? Include any pap smears or colon screening. YES    Advanced Directive:   1. Do you have an Advanced Directive? NO    2. Would you like information on Advanced Directives?  NO

## 2020-07-03 ENCOUNTER — HOSPITAL ENCOUNTER (OUTPATIENT)
Dept: ULTRASOUND IMAGING | Age: 55
Discharge: HOME | End: 2020-07-03
Attending: LEGAL MEDICINE
Payer: COMMERCIAL

## 2020-07-03 ENCOUNTER — HOSPITAL ENCOUNTER (OUTPATIENT)
Dept: MAMMOGRAPHY | Age: 55
Discharge: HOME | End: 2020-07-03
Attending: LEGAL MEDICINE
Payer: COMMERCIAL

## 2020-07-03 DIAGNOSIS — R92.8 ABNORMAL MAMMOGRAM OF LEFT BREAST: ICD-10-CM

## 2020-07-03 PROCEDURE — 76642 ULTRASOUND BREAST LIMITED: CPT

## 2020-07-03 PROCEDURE — 77061 BREAST TOMOSYNTHESIS UNI: CPT

## 2020-08-07 DIAGNOSIS — Z79.890 HORMONE REPLACEMENT THERAPY (HRT): ICD-10-CM

## 2020-08-09 RX ORDER — ESTRADIOL 0.5 MG/1
TABLET ORAL
Qty: 30 TAB | Refills: 0 | Status: SHIPPED | OUTPATIENT
Start: 2020-08-09 | End: 2020-09-21

## 2020-08-17 ENCOUNTER — VIRTUAL VISIT (OUTPATIENT)
Dept: FAMILY MEDICINE CLINIC | Age: 55
End: 2020-08-17

## 2020-08-17 DIAGNOSIS — M47.22 CERVICAL RADICULOPATHY DUE TO DEGENERATIVE JOINT DISEASE OF SPINE: ICD-10-CM

## 2020-08-17 DIAGNOSIS — T14.8XXA SPRAIN AND STRAIN: ICD-10-CM

## 2020-08-17 DIAGNOSIS — E78.2 MIXED HYPERLIPIDEMIA: ICD-10-CM

## 2020-08-17 DIAGNOSIS — I10 ESSENTIAL HYPERTENSION: ICD-10-CM

## 2020-08-17 DIAGNOSIS — E11.8 TYPE 2 DIABETES MELLITUS WITH COMPLICATION, WITHOUT LONG-TERM CURRENT USE OF INSULIN (HCC): Primary | ICD-10-CM

## 2020-08-17 DIAGNOSIS — Z79.890 HORMONE REPLACEMENT THERAPY (HRT): ICD-10-CM

## 2020-08-17 RX ORDER — TRAMADOL HYDROCHLORIDE 50 MG/1
50 TABLET ORAL
COMMUNITY
Start: 2020-08-14

## 2020-08-17 RX ORDER — GABAPENTIN 300 MG/1
300 CAPSULE ORAL
COMMUNITY
Start: 2020-08-14 | End: 2021-03-09

## 2020-08-17 RX ORDER — METHYLPREDNISOLONE 4 MG/1
TABLET ORAL
COMMUNITY
Start: 2020-08-14 | End: 2021-03-09

## 2020-08-17 NOTE — PROGRESS NOTES
Meliton Garza is a 60 y.o. female who was seen by synchronous (real-time) audio-video technology on 8/17/2020 for Follow-up    Patient is here for follow-up and evaluation        Cervical radiculopathy due to degenerative joint disease of spine [M47.22]    Cervical radiculopathy   She had surgery in 2017 and n regarding symptoms of neck pain and radiculopathy pain,      She is having neck pain with radiculopathy, numbness and tingling sensation      Patient was seen by her previous surgeon  office was treated with gabapentin  Medrol Dosepak and tramadol as needed. Patient next step would be MRI and possible injections    Patient does suffer from degenerative disc disease of the cervical spine       patient is very stressed out regarding her job because she works and she is getting work done at home where she does not have a proper desk, and she hours, and she is under a lot of stress. She is considering to take FMLA for her neck pain              Assessment & Plan:   Diagnoses and all orders for this visit:    1. Type 2 diabetes mellitus with complication, without long-term current use of insulin (HCC)  -     HEMOGLOBIN A1C W/O EAG; Future  -     METABOLIC PANEL, COMPREHENSIVE; Future  -     URINALYSIS W/ RFLX MICROSCOPIC; Future    2. Hormone replacement therapy (HRT)    3. Essential hypertension  -     METABOLIC PANEL, COMPREHENSIVE; Future        5. Mixed hyperlipidemia  -     METABOLIC PANEL, COMPREHENSIVE; Future    6. BMI 36.0-36.9,adult    Patient has gained weight since last visit she noted that she have to work on lifestyle modifications    7. Cervical radiculopathy due to degenerative joint disease of spine    Follow up with ortho /spine surgery       712  Subjective:       Prior to Admission medications    Medication Sig Start Date End Date Taking? Authorizing Provider   gabapentin (NEURONTIN) 300 mg capsule Take 300 mg by mouth nightly.  8/14/20   Provider, Historical   methylPREDNISolone (MEDROL DOSEPACK) 4 mg tablet TAKE BY MOUTH AS DIRECTED ON INSIDE OF PACKAGE 8/14/20   Provider, Historical   traMADoL (ULTRAM) 50 mg tablet Take 50 mg by mouth four (4) times daily as needed. 8/14/20   Provider, Historical   estradioL (ESTRACE) 0.5 mg tablet Take 1 tablet by mouth once daily 8/9/20   Marv Delarosa MD   losartan (COZAAR) 100 mg tablet Take 1 tablet by mouth once daily 5/18/20   Marv Delarosa MD   metFORMIN (GLUCOPHAGE) 500 mg tablet Take 1 Tab by mouth two (2) times daily (with meals). 7/26/19   Marv Delarosa MD     Patient Active Problem List   Diagnosis Code    HTN (hypertension) I10    Severe obesity ([address] E66.01    Mixed hyperlipidemia E78.2    Controlled type 2 diabetes mellitus with complication, without long-term current use of insulin ([address] E11.8    Cervical radiculopathy due to degenerative joint disease of spine M47.22    Cervical radiculopathy M54.12    DAVID (generalized anxiety disorder) F41.1     Patient Active Problem List    Diagnosis Date Noted    DAVID (generalized anxiety disorder) 12/16/2019    Controlled type 2 diabetes mellitus with complication, without long-term current use of insulin ([address] 07/26/2019    Severe obesity ([address] 12/14/2018    Mixed hyperlipidemia 12/14/2018    Cervical radiculopathy due to degenerative joint disease of spine 09/06/2017    Cervical radiculopathy 09/06/2017    HTN (hypertension) 06/01/2016     Current Outpatient Medications   Medication Sig Dispense Refill    gabapentin (NEURONTIN) 300 mg capsule Take 300 mg by mouth nightly.  methylPREDNISolone (MEDROL DOSEPACK) 4 mg tablet TAKE BY MOUTH AS DIRECTED ON INSIDE OF PACKAGE      traMADoL (ULTRAM) 50 mg tablet Take 50 mg by mouth four (4) times daily as needed.       estradioL (ESTRACE) 0.5 mg tablet Take 1 tablet by mouth once daily 30 Tab 0    losartan (COZAAR) 100 mg tablet Take 1 tablet by mouth once daily 90 Tab 0    metFORMIN (GLUCOPHAGE) 500 mg tablet Take 1 Tab by mouth two (2) times daily (with meals). 180 Tab 2     Allergies   Allergen Reactions    Flavoring Agent Other (comments)     DRY MOUTH, N&V    Crestor [Rosuvastatin] Nausea Only     Past Medical History:   Diagnosis Date    Gynecological disorder     fibroids and low test.     Hypertension     Neck pain      Past Surgical History:   Procedure Laterality Date    HX GYN      HX HYSTERECTOMY      fibroids     HX HYSTERECTOMY      removal of ovaries     Family History   Problem Relation Age of Onset    Hypertension Mother     High Cholesterol Mother     No Known Problems Father      Social History     Tobacco Use    Smoking status: Never Smoker    Smokeless tobacco: Never Used   Substance Use Topics    Alcohol use: Yes     Comment: socailly        Review of Systems   Constitutional: Negative for chills, fever, malaise/fatigue and weight loss. HENT: Negative for congestion, ear discharge, ear pain, hearing loss and nosebleeds. Eyes: Negative for blurred vision, double vision and discharge. Respiratory: Negative for cough, hemoptysis, sputum production, shortness of breath and wheezing. Cardiovascular: Negative for chest pain, palpitations, claudication and leg swelling. Gastrointestinal: Negative for abdominal pain, constipation, diarrhea, nausea and vomiting. Genitourinary: Negative for dysuria, frequency and urgency. Musculoskeletal: Positive for myalgias and neck pain. Negative for back pain. Skin: Negative for itching and rash. Neurological: Positive for tingling and sensory change. Negative for dizziness, speech change, focal weakness, weakness and headaches. Psychiatric/Behavioral: Negative for depression, hallucinations, memory loss, substance abuse and suicidal ideas. The patient is nervous/anxious. The patient does not have insomnia.         Objective:     Patient-Reported Vitals 8/17/2020   Patient-Reported Systolic  985   Patient-Reported Diastolic 80      General: alert, cooperative, no distress   Mental  status: normal mood, behavior, speech, dress, motor activity, and thought processes, able to follow commands   HENT: NCAT   Neck: no visualized mass   Resp: no respiratory distress   Neuro: no gross deficits   Skin: no discoloration or lesions of concern on visible areas   Psychiatric: normal affect, consistent with stated mood, no evidence of hallucinations     Additional exam findings: We discussed the expected course, resolution and complications of the diagnosis(es) in detail. Medication risks, benefits, costs, interactions, and alternatives were discussed as indicated. I advised her to contact the office if her condition worsens, changes or fails to improve as anticipated. She expressed understanding with the diagnosis(es) and plan. Candi Foreman, who was evaluated through a patient-initiated, synchronous (real-time) audio-video encounter, and/or her healthcare decision maker, is aware that it is a billable service, with coverage as determined by her insurance carrier. She provided verbal consent to proceed: Yes, and patient identification was verified. It was conducted pursuant to the emergency declaration under the [address] [place] [address], [address] [place] [address] authority and the [employer] Act. A caregiver was present when appropriate. Ability to conduct physical exam was limited. I was in the office. The patient was at home.       Danielle Thomas MD

## 2020-08-19 ENCOUNTER — HOSPITAL ENCOUNTER (OUTPATIENT)
Dept: LAB | Age: 55
Discharge: HOME | End: 2020-08-19

## 2020-08-19 LAB — XX-LABCORP SPECIMEN COL,LCBCF: NORMAL

## 2020-08-19 PROCEDURE — 99001 SPECIMEN HANDLING PT-LAB: CPT

## 2020-08-20 LAB
ALBUMIN SERPL-MCNC: 4.3 G/DL (ref 3.8–4.9)
ALBUMIN/GLOB SERPL: 1.6 {RATIO} (ref 1.2–2.2)
ALP SERPL-CCNC: 53 IU/L (ref 39–117)
ALT SERPL-CCNC: 14 IU/L (ref 0–32)
APPEARANCE UR: CLEAR
AST SERPL-CCNC: 15 IU/L (ref 0–40)
BILIRUB SERPL-MCNC: 0.2 MG/DL (ref 0–1.2)
BILIRUB UR QL STRIP: NEGATIVE
BUN SERPL-MCNC: 17 MG/DL (ref 6–24)
BUN/CREAT SERPL: 18 (ref 9–23)
CALCIUM SERPL-MCNC: 9.7 MG/DL (ref 8.7–10.2)
CHLORIDE SERPL-SCNC: 104 MMOL/L (ref 96–106)
CO2 SERPL-SCNC: 23 MMOL/L (ref 20–29)
COLOR UR: YELLOW
CREAT SERPL-MCNC: 0.96 MG/DL (ref 0.57–1)
GLOBULIN SER CALC-MCNC: 2.7 G/DL (ref 1.5–4.5)
GLUCOSE SERPL-MCNC: 74 MG/DL (ref 65–99)
GLUCOSE UR QL: NEGATIVE
HBA1C MFR BLD: 6.1 % (ref 4.8–5.6)
HGB UR QL STRIP: NEGATIVE
KETONES UR QL STRIP: NEGATIVE
LEUKOCYTE ESTERASE UR QL STRIP: NEGATIVE
MICRO URNS: NORMAL
NITRITE UR QL STRIP: NEGATIVE
PH UR STRIP: 6 [PH] (ref 5–7.5)
POTASSIUM SERPL-SCNC: 4 MMOL/L (ref 3.5–5.2)
PROT SERPL-MCNC: 7 G/DL (ref 6–8.5)
PROT UR QL STRIP: NEGATIVE
SODIUM SERPL-SCNC: 142 MMOL/L (ref 134–144)
SP GR UR: 1.03 (ref 1–1.03)
UROBILINOGEN UR STRIP-MCNC: 0.2 MG/DL (ref 0.2–1)

## 2020-08-27 DIAGNOSIS — I10 ESSENTIAL HYPERTENSION: ICD-10-CM

## 2020-08-29 RX ORDER — LOSARTAN POTASSIUM 100 MG/1
TABLET ORAL
Qty: 90 TAB | Refills: 0 | Status: SHIPPED | OUTPATIENT
Start: 2020-08-29 | End: 2020-11-25

## 2020-08-31 NOTE — PROGRESS NOTES
All results are within normal limit hemoglobin A1c 6.1 it is a prediabetes range slightly increased from last time patient need to be adherent to lifestyle modification decrease carbohydrate and eliminate sugar as well as increase and regular exercise gradually as tolerated

## 2020-09-20 DIAGNOSIS — E11.8 CONTROLLED TYPE 2 DIABETES MELLITUS WITH COMPLICATION, WITHOUT LONG-TERM CURRENT USE OF INSULIN (HCC): ICD-10-CM

## 2020-09-20 DIAGNOSIS — Z79.890 HORMONE REPLACEMENT THERAPY (HRT): ICD-10-CM

## 2020-09-21 RX ORDER — ESTRADIOL 0.5 MG/1
TABLET ORAL
Qty: 30 TAB | Refills: 2 | Status: SHIPPED | OUTPATIENT
Start: 2020-09-21

## 2020-09-21 RX ORDER — METFORMIN HYDROCHLORIDE 500 MG/1
TABLET ORAL
Qty: 180 TAB | Refills: 0 | OUTPATIENT
Start: 2020-09-21

## 2020-10-02 DIAGNOSIS — E11.8 CONTROLLED TYPE 2 DIABETES MELLITUS WITH COMPLICATION, WITHOUT LONG-TERM CURRENT USE OF INSULIN (HCC): ICD-10-CM

## 2020-10-02 NOTE — TELEPHONE ENCOUNTER
Patient called stating that she has been requesting this medication for about a week and hasn't gotten a response. Please advise. Requested Prescriptions     Pending Prescriptions Disp Refills    metFORMIN (GLUCOPHAGE) 500 mg tablet 180 Tab 2     Sig: Take 1 Tab by mouth two (2) times daily (with meals).     estradioL (ESTRACE) 0.5 mg tablet 30 Tab 0

## 2020-10-03 RX ORDER — METFORMIN HYDROCHLORIDE 500 MG/1
TABLET ORAL
Qty: 180 TAB | Refills: 0 | Status: SHIPPED | OUTPATIENT
Start: 2020-10-03 | End: 2021-10-14

## 2020-10-03 RX ORDER — METFORMIN HYDROCHLORIDE 500 MG/1
500 TABLET ORAL 2 TIMES DAILY WITH MEALS
Qty: 180 TAB | Refills: 2 | Status: SHIPPED | OUTPATIENT
Start: 2020-10-03 | End: 2020-10-03 | Stop reason: SDUPTHER

## 2020-10-03 RX ORDER — ESTRADIOL 0.5 MG/1
0.5 TABLET ORAL DAILY
Qty: 90 TAB | Refills: 0 | Status: SHIPPED | OUTPATIENT
Start: 2020-10-03 | End: 2021-03-09 | Stop reason: SDUPTHER

## 2020-11-24 DIAGNOSIS — I10 ESSENTIAL HYPERTENSION: ICD-10-CM

## 2020-11-25 RX ORDER — LOSARTAN POTASSIUM 100 MG/1
TABLET ORAL
Qty: 90 TAB | Refills: 0 | Status: SHIPPED | OUTPATIENT
Start: 2020-11-25 | End: 2021-03-16

## 2020-12-23 ENCOUNTER — TELEPHONE (OUTPATIENT)
Dept: FAMILY MEDICINE CLINIC | Age: 55
End: 2020-12-23

## 2020-12-23 DIAGNOSIS — R92.8 ABNORMAL MAMMOGRAM: Primary | ICD-10-CM

## 2020-12-23 NOTE — TELEPHONE ENCOUNTER
Pt called today to schedule Physical appt. She also wanted to know if she would be referred for a second mammogram before the end of the year since her last one had abnormal results. Please advise.

## 2020-12-31 NOTE — TELEPHONE ENCOUNTER
Spoke with patient she understood and can't schedule due to insurance changes.  She knows orders are placed

## 2021-01-11 ENCOUNTER — HOSPITAL ENCOUNTER (OUTPATIENT)
Dept: ULTRASOUND IMAGING | Age: 56
Discharge: HOME | End: 2021-01-11
Attending: LEGAL MEDICINE
Payer: COMMERCIAL

## 2021-01-11 DIAGNOSIS — R92.8 ABNORMAL MAMMOGRAM: ICD-10-CM

## 2021-01-11 PROCEDURE — 76642 ULTRASOUND BREAST LIMITED: CPT

## 2021-01-25 ENCOUNTER — TELEPHONE (OUTPATIENT)
Dept: FAMILY MEDICINE CLINIC | Age: 56
End: 2021-01-25

## 2021-01-25 DIAGNOSIS — Z00.00 ANNUAL PHYSICAL EXAM: ICD-10-CM

## 2021-01-25 DIAGNOSIS — E11.8 CONTROLLED TYPE 2 DIABETES MELLITUS WITH COMPLICATION, WITHOUT LONG-TERM CURRENT USE OF INSULIN (HCC): ICD-10-CM

## 2021-01-25 DIAGNOSIS — I10 ESSENTIAL HYPERTENSION: Primary | ICD-10-CM

## 2021-01-25 NOTE — TELEPHONE ENCOUNTER
Pt called for CPE appt and labs. Please place labs.      Future Appointments   Date Time Provider Amanda Leida   2/17/2021 10:20 AM [facility]   2/17/2021 10:30 AM Narayan Lawton MD [facility] AM[facility]

## 2021-02-16 ENCOUNTER — TELEPHONE (OUTPATIENT)
Dept: FAMILY MEDICINE CLINIC | Age: 56
End: 2021-02-16

## 2021-02-16 NOTE — TELEPHONE ENCOUNTER
Patient wants to get her covid shot through [employer]. Does she qualify to get it through [employer]?

## 2021-03-09 ENCOUNTER — HOSPITAL ENCOUNTER (OUTPATIENT)
Dept: LAB | Age: 56
Discharge: HOME | End: 2021-03-09
Payer: COMMERCIAL

## 2021-03-09 ENCOUNTER — APPOINTMENT (OUTPATIENT)
Dept: FAMILY MEDICINE CLINIC | Age: 56
End: 2021-03-09

## 2021-03-09 ENCOUNTER — OFFICE VISIT (OUTPATIENT)
Dept: FAMILY MEDICINE CLINIC | Age: 56
End: 2021-03-09
Payer: COMMERCIAL

## 2021-03-09 VITALS
OXYGEN SATURATION: 99 % | WEIGHT: 217 LBS | HEIGHT: 64 IN | BODY MASS INDEX: 37.05 KG/M2 | TEMPERATURE: 96.6 F | SYSTOLIC BLOOD PRESSURE: 137 MMHG | HEART RATE: 81 BPM | RESPIRATION RATE: 16 BRPM | DIASTOLIC BLOOD PRESSURE: 74 MMHG

## 2021-03-09 DIAGNOSIS — Z00.00 ANNUAL PHYSICAL EXAM: ICD-10-CM

## 2021-03-09 DIAGNOSIS — R68.82 DECREASED LIBIDO: ICD-10-CM

## 2021-03-09 DIAGNOSIS — Z00.00 ANNUAL PHYSICAL EXAM: Primary | ICD-10-CM

## 2021-03-09 DIAGNOSIS — R10.30 LOWER ABDOMINAL PAIN: ICD-10-CM

## 2021-03-09 DIAGNOSIS — I10 ESSENTIAL HYPERTENSION: ICD-10-CM

## 2021-03-09 DIAGNOSIS — E78.2 MIXED HYPERLIPIDEMIA: ICD-10-CM

## 2021-03-09 DIAGNOSIS — E11.8 CONTROLLED TYPE 2 DIABETES MELLITUS WITH COMPLICATION, WITHOUT LONG-TERM CURRENT USE OF INSULIN (HCC): ICD-10-CM

## 2021-03-09 DIAGNOSIS — K57.90 DIVERTICULOSIS: ICD-10-CM

## 2021-03-09 LAB
ALBUMIN SERPL-MCNC: 3.8 G/DL (ref 3.4–5)
ALBUMIN/GLOB SERPL: 1.1 {RATIO} (ref 0.8–1.7)
ALP SERPL-CCNC: 56 U/L (ref 45–117)
ALT SERPL-CCNC: 28 U/L (ref 13–56)
ANION GAP SERPL CALC-SCNC: 6 MMOL/L (ref 3–18)
AST SERPL-CCNC: 16 U/L (ref 10–38)
BASOPHILS # BLD: 0 K/UL (ref 0–0.1)
BASOPHILS NFR BLD: 1 % (ref 0–2)
BILIRUB SERPL-MCNC: 0.4 MG/DL (ref 0.2–1)
BUN SERPL-MCNC: 10 MG/DL (ref 7–18)
BUN/CREAT SERPL: 11 (ref 12–20)
CALCIUM SERPL-MCNC: 8.9 MG/DL (ref 8.5–10.1)
CHLORIDE SERPL-SCNC: 107 MMOL/L (ref 100–111)
CHOLEST SERPL-MCNC: 220 MG/DL
CO2 SERPL-SCNC: 28 MMOL/L (ref 21–32)
CREAT SERPL-MCNC: 0.94 MG/DL (ref 0.6–1.3)
CREAT UR-MCNC: 373 MG/DL (ref 30–125)
DIFFERENTIAL METHOD BLD: NORMAL
EOSINOPHIL # BLD: 0.1 K/UL (ref 0–0.4)
EOSINOPHIL NFR BLD: 1 % (ref 0–5)
ERYTHROCYTE [DISTWIDTH] IN BLOOD BY AUTOMATED COUNT: 13.7 % (ref 11.6–14.5)
GLOBULIN SER CALC-MCNC: 3.6 G/DL (ref 2–4)
GLUCOSE SERPL-MCNC: 102 MG/DL (ref 74–99)
HBA1C MFR BLD: 6.2 % (ref 4.2–5.6)
HCT VFR BLD AUTO: 38.3 % (ref 35–45)
HDLC SERPL-MCNC: 57 MG/DL (ref 40–60)
HDLC SERPL: 3.9 {RATIO} (ref 0–5)
HGB BLD-MCNC: 12.1 G/DL (ref 12–16)
LDLC SERPL CALC-MCNC: 140.4 MG/DL (ref 0–100)
LIPID PROFILE,FLP: ABNORMAL
LYMPHOCYTES # BLD: 2.5 K/UL (ref 0.9–3.6)
LYMPHOCYTES NFR BLD: 41 % (ref 21–52)
MCH RBC QN AUTO: 28.5 PG (ref 24–34)
MCHC RBC AUTO-ENTMCNC: 31.6 G/DL (ref 31–37)
MCV RBC AUTO: 90.1 FL (ref 74–97)
MICROALBUMIN UR-MCNC: 4.69 MG/DL (ref 0–3)
MICROALBUMIN/CREAT UR-RTO: 13 MG/G (ref 0–30)
MONOCYTES # BLD: 0.4 K/UL (ref 0.05–1.2)
MONOCYTES NFR BLD: 7 % (ref 3–10)
NEUTS SEG # BLD: 3.1 K/UL (ref 1.8–8)
NEUTS SEG NFR BLD: 50 % (ref 40–73)
PLATELET # BLD AUTO: 359 K/UL (ref 135–420)
PMV BLD AUTO: 9.7 FL (ref 9.2–11.8)
POTASSIUM SERPL-SCNC: 4.3 MMOL/L (ref 3.5–5.5)
PROT SERPL-MCNC: 7.4 G/DL (ref 6.4–8.2)
RBC # BLD AUTO: 4.25 M/UL (ref 4.2–5.3)
SODIUM SERPL-SCNC: 141 MMOL/L (ref 136–145)
TRIGL SERPL-MCNC: 113 MG/DL (ref ?–150)
VLDLC SERPL CALC-MCNC: 22.6 MG/DL
WBC # BLD AUTO: 6.1 K/UL (ref 4.6–13.2)

## 2021-03-09 PROCEDURE — 99396 PREV VISIT EST AGE 40-64: CPT | Performed by: LEGAL MEDICINE

## 2021-03-09 PROCEDURE — 85025 COMPLETE CBC W/AUTO DIFF WBC: CPT

## 2021-03-09 PROCEDURE — 36415 COLL VENOUS BLD VENIPUNCTURE: CPT

## 2021-03-09 PROCEDURE — 80053 COMPREHEN METABOLIC PANEL: CPT

## 2021-03-09 PROCEDURE — 84403 ASSAY OF TOTAL TESTOSTERONE: CPT

## 2021-03-09 PROCEDURE — 82043 UR ALBUMIN QUANTITATIVE: CPT

## 2021-03-09 PROCEDURE — 80061 LIPID PANEL: CPT

## 2021-03-09 PROCEDURE — 83036 HEMOGLOBIN GLYCOSYLATED A1C: CPT

## 2021-03-09 RX ORDER — DICYCLOMINE HYDROCHLORIDE 10 MG/1
10 CAPSULE ORAL 3 TIMES DAILY
Qty: 90 CAP | Refills: 0 | Status: SHIPPED | OUTPATIENT
Start: 2021-03-09 | End: 2021-09-21

## 2021-03-09 RX ORDER — FLASH GLUCOSE SCANNING READER
EACH MISCELLANEOUS
Qty: 1 EACH | Refills: 0 | Status: SHIPPED | OUTPATIENT
Start: 2021-03-09 | End: 2022-10-31

## 2021-03-09 RX ORDER — FLASH GLUCOSE SENSOR
KIT MISCELLANEOUS
Qty: 7 KIT | Refills: 1 | Status: SHIPPED | OUTPATIENT
Start: 2021-03-09

## 2021-03-09 NOTE — PROGRESS NOTES
[address] [place] [address]     Chief Complaint   Patient presents with    Complete Physical     CPE     Vitals:    03/09/21 1014   BP: 137/74   Pulse: 81   Resp: 16   Temp: (!) 96.6 °F (35.9 °C)   TempSrc: Temporal   SpO2: 99%   Weight: 217 lb (98.4 kg)   Height: 5' 4\" (1.626 m)   PainSc:   0 - No pain         HPI:Pateint is here for annual physical exam, and follow up on chronic illness    Has been doing stable less  anxiety less stress    Patient is still trying on lifestyle modification she does not exercise, and and she is trying to make better choices with her food    Patient also complaining about decreased libido    She has been on hormone replacement for many years    She does understand the risk of coagulation and clots as well as risk of breast  cancer    She does have a history of diverticulosis and recently has been having episodes of abdominal pain and occasionally she takes tramadol for abdominal pain pain    Past Medical History:   Diagnosis Date    Gynecological disorder     fibroids and low test.     Hypertension     Neck pain      Past Surgical History:   Procedure Laterality Date    HX GYN      HX HYSTERECTOMY      fibroids     HX HYSTERECTOMY      removal of ovaries     Social History     Tobacco Use    Smoking status: Never Smoker    Smokeless tobacco: Never Used   Substance Use Topics    Alcohol use: Yes     Comment: socailly        Family History   Problem Relation Age of Onset    Hypertension Mother     High Cholesterol Mother     No Known Problems Father        Review of Systems   Constitutional: Negative for chills, fever, malaise/fatigue and weight loss. HENT: Negative for congestion, ear discharge, ear pain, hearing loss, nosebleeds, sinus pain and sore throat. Eyes: Negative for blurred vision, double vision and discharge. Respiratory: Negative for cough, hemoptysis, sputum production, shortness of breath and wheezing.     Cardiovascular: Negative for chest pain, palpitations, claudication and leg swelling. Gastrointestinal: Negative for abdominal pain, constipation, diarrhea, nausea and vomiting. Genitourinary: Negative for dysuria, flank pain, frequency, hematuria and urgency. Musculoskeletal: Negative for back pain, falls, joint pain, myalgias and neck pain. Skin: Negative for itching and rash. Neurological: Negative for dizziness, tingling, sensory change, speech change, focal weakness, weakness and headaches. Psychiatric/Behavioral: Negative for depression, hallucinations, substance abuse and suicidal ideas. The patient is nervous/anxious. The patient does not have insomnia. Physical Exam  Vitals signs and nursing note reviewed. Constitutional:       General: She is not in acute distress. Appearance: She is well-developed. She is not diaphoretic. HENT:      Head: Normocephalic and atraumatic. Eyes:      General: No scleral icterus. Right eye: No discharge. Left eye: No discharge. Conjunctiva/sclera: Conjunctivae normal.      Pupils: Pupils are equal, round, and reactive to light. Neck:      Thyroid: No thyromegaly. Cardiovascular:      Rate and Rhythm: Normal rate and regular rhythm. Heart sounds: Normal heart sounds. Pulmonary:      Effort: Pulmonary effort is normal. No respiratory distress. Breath sounds: Normal breath sounds. No wheezing or rales. Chest:      Chest wall: No tenderness. Abdominal:      General: There is no distension. Palpations: Abdomen is soft. Tenderness: There is no abdominal tenderness. There is no rebound. Musculoskeletal: Normal range of motion. General: No tenderness or deformity. Lymphadenopathy:      Cervical: No cervical adenopathy. Skin:     General: Skin is warm and dry. Coloration: Skin is not pale. Findings: No erythema or rash. Neurological:      Mental Status: She is alert and oriented to person, place, and time.       Cranial Nerves: No cranial nerve deficit. Coordination: Coordination normal.   Psychiatric:         Behavior: Behavior normal.         Thought Content: Thought content normal.         Judgment: Judgment normal.          Assessment and plan     Plan of care has been discussed with the patient, he agrees to the plan and verbalized understanding. All his questions were answered  More than 50% of the time spent in this visit was counseling the patient about  illness and treatment options         1. Annual physical exam    - LIPID PANEL; Future  - METABOLIC PANEL, COMPREHENSIVE; Future  - CBC WITH AUTOMATED DIFF; Future    2. Essential hypertension  Blood pressures well controlled on current medications    3. Controlled type 2 diabetes mellitus with complication, without long-term current use of insulin ([facility])    - MICROALBUMIN, UR, RAND W/ MICROALB/CREAT RATIO; Future  - LIPID PANEL; Future  - METABOLIC PANEL, COMPREHENSIVE; Future  - HEMOGLOBIN A1C W/O EAG; Future  - flash glucose sensor (FreeStyle Tomy 14 Day Sensor) kit; Check blood sugar 3  times daily  Dispense: 7 Kit; Refill: 1  - flash glucose scanning reader (CerelinkStyle Tomy 14 Day [place]) misc; Use 3 times daily  Dispense: 1 Each; Refill: 0    4. Mixed hyperlipidemia    - LIPID PANEL; Future    5. Diverticulosis    - dicyclomine (BENTYL) 10 mg capsule; Take 1 Cap by mouth three (3) times daily for 30 days. Dispense: 90 Cap; Refill: 0    6. Lower abdominal pain    - dicyclomine (BENTYL) 10 mg capsule; Take 1 Cap by mouth three (3) times daily for 30 days. Dispense: 90 Cap; Refill: 0    7. Decreased libido    - TESTOSTERONE, FREE & TOTAL; Future    Current Outpatient Medications   Medication Sig Dispense Refill    dicyclomine (BENTYL) 10 mg capsule Take 1 Cap by mouth three (3) times daily for 30 days.  90 Cap 0    flash glucose sensor (FreeStyle Tomy 14 Day Sensor) kit Check blood sugar 3  times daily 7 Kit 1    flash glucose scanning reader ([employer] 14 Day [place]) misc Use 3 times daily 1 Each 0    losartan (COZAAR) 100 mg tablet Take 1 tablet by mouth once daily 90 Tab 0    metFORMIN (GLUCOPHAGE) 500 mg tablet TAKE 1 TABLET BY MOUTH TWICE DAILY WITH MEALS 180 Tab 0    estradioL (ESTRACE) 0.5 mg tablet Take 1 tablet by mouth once daily 30 Tab 2    traMADoL (ULTRAM) 50 mg tablet Take 50 mg by mouth four (4) times daily as needed. Patient Active Problem List    Diagnosis Date Noted    DAVID (generalized anxiety disorder) 12/16/2019    Controlled type 2 diabetes mellitus with complication, without long-term current use of insulin ([facility] 07/26/2019    Severe obesity ([facility] 12/14/2018    Mixed hyperlipidemia 12/14/2018    Cervical radiculopathy due to degenerative joint disease of spine 09/06/2017    Cervical radiculopathy 09/06/2017    HTN (hypertension) 06/01/2016     Results for orders placed or performed during the hospital encounter of 08/19/20   [facility] SPECIMEN COL   Result Value Ref Range    [facility] SPECIM[facility]. Specimens collected/sent to [facility]. Please direct inquiries to ([phone]). No visits with results within 3 Month(s) from this visit. Latest known visit with results is:   Hospital Outpatient Visit on 08/19/2020   Component Date Value Ref Range Status    [facility] SPEC[facility]. 08/19/2020 Specimens collected/sent to [facility]. Please direct inquiries to ([phone]).     Final

## 2021-03-09 NOTE — PROGRESS NOTES
Gama Mazariegos is a 60 y.o. female (: 1965) presenting to address:    Chief Complaint   Patient presents with    Complete Physical     CPE       Vitals:    21 1014   BP: 137/74   Pulse: 81   Resp: 16   Temp: (!) 96.6 °F (35.9 °C)   TempSrc: Temporal   SpO2: 99%   Weight: 217 lb (98.4 kg)   Height: 5' 4\" (1.626 m)   PainSc:   0 - No pain       Hearing/Vision:   No exam data present    Learning Assessment:     Learning Assessment 10/28/2019   PRIMARY LEARNER Patient   PRIMARY LANGUAGE ENGLISH   LEARNER PREFERENCE PRIMARY PICTURES     LISTENING     READING     DEMONSTRATION     VIDEOS   ANSWERED BY patient   RELATIONSHIP SELF     Depression Screening:     3 most recent PHQ Screens 3/9/2021   Little interest or pleasure in doing things Not at all   Feeling down, depressed, irritable, or hopeless Not at all   Total Score PHQ 2 0     Fall Risk Assessment:     Fall Risk Assessment, last 12 mths 3/9/2021   Able to walk? Yes   Fall in past 12 months? 0   Do you feel unsteady? 0   Are you worried about falling 0     Abuse Screening:     Abuse Screening Questionnaire 2020   Do you ever feel afraid of your partner? N   Are you in a relationship with someone who physically or mentally threatens you? N   Is it safe for you to go home? Y     Coordination of Care Questionaire:   1. Have you been to the ER, urgent care clinic since your last visit? Hospitalized since your last visit? NO    2. Have you seen or consulted any other health care providers outside of the [address] [place] [address] since your last visit? Include any pap smears or colon screening. NO    Advanced Directive:   1. Do you have an Advanced Directive? YES    2. Would you like information on Advanced Directives?  NO

## 2021-03-14 LAB
COMMENT, TESC2: NORMAL
TESTOST FREE SERPL-MCNC: 2.4 PG/ML (ref 0–4.2)
TESTOST SERPL-MCNC: 17 NG/DL (ref 3–41)

## 2021-03-15 DIAGNOSIS — I10 ESSENTIAL HYPERTENSION: ICD-10-CM

## 2021-03-16 RX ORDER — LOSARTAN POTASSIUM 100 MG/1
TABLET ORAL
Qty: 90 TAB | Refills: 1 | Status: SHIPPED | OUTPATIENT
Start: 2021-03-16 | End: 2021-10-14

## 2021-03-21 NOTE — PROGRESS NOTES
Hemoglobin A1c is well controlled 6.2  Elevated cholesterol and elevated LDL that has gotten slightly worse from last time, patient to be on statin to reduce risk of heart disease and strokes if she agrees I will send a prescription     Otherwise all results are within normal limit including testosterone level

## 2021-03-22 ENCOUNTER — TELEPHONE (OUTPATIENT)
Dept: FAMILY MEDICINE CLINIC | Age: 56
End: 2021-03-22

## 2021-03-22 DIAGNOSIS — E78.2 MIXED HYPERLIPIDEMIA: Primary | ICD-10-CM

## 2021-03-22 DIAGNOSIS — E11.8 CONTROLLED TYPE 2 DIABETES MELLITUS WITH COMPLICATION, WITHOUT LONG-TERM CURRENT USE OF INSULIN (HCC): ICD-10-CM

## 2021-03-22 RX ORDER — ATORVASTATIN CALCIUM 20 MG/1
20 TABLET, FILM COATED ORAL DAILY
Qty: 90 TAB | Refills: 1 | Status: SHIPPED | OUTPATIENT
Start: 2021-03-22 | End: 2021-09-21 | Stop reason: DRUGHIGH

## 2021-05-18 NOTE — TELEPHONE ENCOUNTER
Last seen: 4/5/19  Next appt not scheduled. Per Pharmacy,  Requested Prescriptions     Pending Prescriptions Disp Refills    estradiol (ESTRACE) 0.5 mg tablet 90 Tab 2     Sig: Take 1 Tab by mouth daily.
Detail Level: Zone

## 2021-07-30 ENCOUNTER — PATIENT MESSAGE (OUTPATIENT)
Dept: FAMILY MEDICINE CLINIC | Age: 56
End: 2021-07-30

## 2021-07-30 DIAGNOSIS — Z12.31 ENCOUNTER FOR SCREENING MAMMOGRAM FOR MALIGNANT NEOPLASM OF BREAST: Primary | ICD-10-CM

## 2021-07-30 DIAGNOSIS — E78.2 MIXED HYPERLIPIDEMIA: ICD-10-CM

## 2021-07-30 DIAGNOSIS — E11.8 CONTROLLED TYPE 2 DIABETES MELLITUS WITH COMPLICATION, WITHOUT LONG-TERM CURRENT USE OF INSULIN (HCC): ICD-10-CM

## 2021-07-30 DIAGNOSIS — I10 ESSENTIAL HYPERTENSION: ICD-10-CM

## 2021-07-30 DIAGNOSIS — R92.8 ABNORMAL ULTRASOUND OF BREAST: ICD-10-CM

## 2021-07-30 NOTE — TELEPHONE ENCOUNTER
From: Emmanuelle Brady  To: Mc Ahuja MD  Sent: 7/30/2021 11:31 AM EDT  Subject: Visit Follow-Up Question    Good morning,  I have my 6 month follow up for a mammogram. I need an order sent to [facility] in [place] before I can set appt. I am off on August 13th and would like to go on that day. Please send ASAP. Also, I'd like to have labs done to see what my A1C and cholesterol levels are since I've been taking the new meds for the cholesterol. Thank you!

## 2021-08-03 ENCOUNTER — TELEPHONE (OUTPATIENT)
Dept: FAMILY MEDICINE CLINIC | Age: 56
End: 2021-08-03

## 2021-08-03 NOTE — TELEPHONE ENCOUNTER
Patient called in regards to having an order for mammogram as well as scheduling her lab work for lipid and a1c

## 2021-08-04 NOTE — TELEPHONE ENCOUNTER
Patient called back to check the status of her order and was informed of the turn around time. Patient states that she is trying to get everything done on 8/13/21 because she has already taken off of work, please advise.

## 2021-08-04 NOTE — TELEPHONE ENCOUNTER
Regarding: Visit Follow-Up Question  Contact: [phone]  ----- Message from Logan Kinney LPN sent at 2/86/4699 11:38 AM EDT -----       ----- Message from Aditya Presley to Joanna Cooley MD sent at 7/30/2021 11:31 AM -----   Good morning,  I have my 6 month follow up for a mammogram. I need an order sent to [facility] in [place] before I can set appt. I am off on August 13th and would like to go on that day. Please send ASAP. Also, I'd like to have labs done to see what my A1C and cholesterol levels are since I've been taking the new meds for the cholesterol. Thank you!

## 2021-08-05 ENCOUNTER — TELEPHONE (OUTPATIENT)
Dept: FAMILY MEDICINE CLINIC | Age: 56
End: 2021-08-05

## 2021-08-05 DIAGNOSIS — R92.8 FOLLOW-UP EXAMINATION OF ABNORMAL MAMMOGRAM: Primary | ICD-10-CM

## 2021-08-05 NOTE — TELEPHONE ENCOUNTER
Patient needs a Diagnostic Mammogram done instead of a screening since they on checking the left breast with an US.

## 2021-08-10 ENCOUNTER — APPOINTMENT (OUTPATIENT)
Dept: FAMILY MEDICINE CLINIC | Age: 56
End: 2021-08-10

## 2021-08-10 ENCOUNTER — HOSPITAL ENCOUNTER (OUTPATIENT)
Dept: LAB | Age: 56
Discharge: HOME | End: 2021-08-10
Payer: COMMERCIAL

## 2021-08-10 DIAGNOSIS — E11.8 CONTROLLED TYPE 2 DIABETES MELLITUS WITH COMPLICATION, WITHOUT LONG-TERM CURRENT USE OF INSULIN (HCC): ICD-10-CM

## 2021-08-10 DIAGNOSIS — E78.2 MIXED HYPERLIPIDEMIA: ICD-10-CM

## 2021-08-10 DIAGNOSIS — I10 ESSENTIAL HYPERTENSION: ICD-10-CM

## 2021-08-10 LAB
ALBUMIN SERPL-MCNC: 4 G/DL (ref 3.4–5)
ALBUMIN/GLOB SERPL: 1.2 {RATIO} (ref 0.8–1.7)
ALP SERPL-CCNC: 67 U/L (ref 45–117)
ALT SERPL-CCNC: 26 U/L (ref 13–56)
ANION GAP SERPL CALC-SCNC: 7 MMOL/L (ref 3–18)
AST SERPL-CCNC: 21 U/L (ref 10–38)
BILIRUB SERPL-MCNC: 0.3 MG/DL (ref 0.2–1)
BUN SERPL-MCNC: 13 MG/DL (ref 7–18)
BUN/CREAT SERPL: 12 (ref 12–20)
CALCIUM SERPL-MCNC: 9.1 MG/DL (ref 8.5–10.1)
CHLORIDE SERPL-SCNC: 110 MMOL/L (ref 100–111)
CHOLEST SERPL-MCNC: 201 MG/DL
CO2 SERPL-SCNC: 26 MMOL/L (ref 21–32)
CREAT SERPL-MCNC: 1.06 MG/DL (ref 0.6–1.3)
EST. AVERAGE GLUCOSE BLD GHB EST-MCNC: 134 MG/DL
GLOBULIN SER CALC-MCNC: 3.3 G/DL (ref 2–4)
GLUCOSE SERPL-MCNC: 106 MG/DL (ref 74–99)
HBA1C MFR BLD: 6.3 % (ref 4.2–5.6)
HDLC SERPL-MCNC: 65 MG/DL (ref 40–60)
HDLC SERPL: 3.1 {RATIO} (ref 0–5)
LDLC SERPL CALC-MCNC: 117.4 MG/DL (ref 0–100)
LIPID PROFILE,FLP: ABNORMAL
POTASSIUM SERPL-SCNC: 4.4 MMOL/L (ref 3.5–5.5)
PROT SERPL-MCNC: 7.3 G/DL (ref 6.4–8.2)
SODIUM SERPL-SCNC: 143 MMOL/L (ref 136–145)
TRIGL SERPL-MCNC: 93 MG/DL (ref ?–150)
VLDLC SERPL CALC-MCNC: 18.6 MG/DL

## 2021-08-10 PROCEDURE — 83036 HEMOGLOBIN GLYCOSYLATED A1C: CPT

## 2021-08-10 PROCEDURE — 80061 LIPID PANEL: CPT

## 2021-08-10 PROCEDURE — 80053 COMPREHEN METABOLIC PANEL: CPT

## 2021-08-10 PROCEDURE — 36415 COLL VENOUS BLD VENIPUNCTURE: CPT

## 2021-08-13 ENCOUNTER — TELEPHONE (OUTPATIENT)
Dept: FAMILY MEDICINE CLINIC | Age: 56
End: 2021-08-13

## 2021-08-13 ENCOUNTER — HOSPITAL ENCOUNTER (OUTPATIENT)
Dept: MAMMOGRAPHY | Age: 56
Discharge: HOME | End: 2021-08-13
Attending: LEGAL MEDICINE
Payer: COMMERCIAL

## 2021-08-13 ENCOUNTER — HOSPITAL ENCOUNTER (OUTPATIENT)
Dept: ULTRASOUND IMAGING | Age: 56
Discharge: HOME | End: 2021-08-13
Attending: LEGAL MEDICINE
Payer: COMMERCIAL

## 2021-08-13 DIAGNOSIS — R92.8 ABNORMAL ULTRASOUND OF BREAST: ICD-10-CM

## 2021-08-13 DIAGNOSIS — R92.8 ABNORMAL MAMMOGRAM: Primary | ICD-10-CM

## 2021-08-13 DIAGNOSIS — R92.8 FOLLOW-UP EXAMINATION OF ABNORMAL MAMMOGRAM: ICD-10-CM

## 2021-08-13 PROCEDURE — 77062 BREAST TOMOSYNTHESIS BI: CPT

## 2021-08-13 PROCEDURE — 76642 ULTRASOUND BREAST LIMITED: CPT

## 2021-08-18 ENCOUNTER — TELEPHONE (OUTPATIENT)
Dept: FAMILY MEDICINE CLINIC | Age: 56
End: 2021-08-18

## 2021-08-18 DIAGNOSIS — E78.2 MIXED HYPERLIPIDEMIA: ICD-10-CM

## 2021-08-18 DIAGNOSIS — E11.8 CONTROLLED TYPE 2 DIABETES MELLITUS WITH COMPLICATION, WITHOUT LONG-TERM CURRENT USE OF INSULIN (HCC): ICD-10-CM

## 2021-08-18 NOTE — TELEPHONE ENCOUNTER
HB A1c is stable     Normal renal function and liver function     Lipid showed improvement      As a patient with diabetes LDL to be below 100    And her LDL has significantly decreased now 117    Recommendation is for all  patient with diabetes to be on a statin to reduce the risk of heart disease and strokes

## 2021-08-19 RX ORDER — ATORVASTATIN CALCIUM 40 MG/1
40 TABLET, FILM COATED ORAL DAILY
Qty: 90 TABLET | Refills: 0 | Status: SHIPPED | OUTPATIENT
Start: 2021-08-19 | End: 2022-01-02

## 2021-08-19 RX ORDER — ATORVASTATIN CALCIUM 20 MG/1
20 TABLET, FILM COATED ORAL DAILY
COMMUNITY
End: 2021-08-19 | Stop reason: DRUGHIGH

## 2021-08-19 NOTE — TELEPHONE ENCOUNTER
LDL need to be below 100 she  Will need to take 40 mg tablet  Can take 2 of 20 till she finishes the current refill and will send new RX

## 2021-08-19 NOTE — TELEPHONE ENCOUNTER
Spoke to patient she states she is on atorvastatin 20 mg already that you prescribed back on 3/22/21. Should she continue with this medication or does it need to be increased.

## 2021-09-03 ENCOUNTER — TELEPHONE (OUTPATIENT)
Dept: FAMILY MEDICINE CLINIC | Age: 56
End: 2021-09-03

## 2021-09-03 NOTE — TELEPHONE ENCOUNTER
Pt stated that she would like to know if she could take a vitamin called liquid chlorophyll. She stated that it would be for energy and her cholesterol. But she wanted to make sure that it was safe to take because she takes other medication and she does not want them to interact with one another. She would like a call back today if possible.  Please advise

## 2021-09-20 ENCOUNTER — TELEPHONE (OUTPATIENT)
Dept: FAMILY MEDICINE CLINIC | Age: 56
End: 2021-09-20

## 2021-09-20 NOTE — TELEPHONE ENCOUNTER
Spoke w/ patient and offered an appt, however patient stated that she just came back from vacation and she can not take off anymore days from work, Patient would like to know if PCP has any advice for health concerns.

## 2021-09-20 NOTE — TELEPHONE ENCOUNTER
Patient is scheduled for the following:   Future Appointments   Date Time Provider Amanda Casarez   9/21/2021 11:15 AM MD LEANNE Hinds BS AMB

## 2021-09-20 NOTE — TELEPHONE ENCOUNTER
Patient is concern with the mold in her apt , and she wants to know if it is affecting her health she has diabetes and High Blood pressure and she said she is having some coughing and itching . The patient wanted to receive a call back in regards to what she should do . Also stated that she wants to get some blood work due to the exposure of mold .

## 2021-09-21 ENCOUNTER — VIRTUAL VISIT (OUTPATIENT)
Dept: FAMILY MEDICINE CLINIC | Age: 56
End: 2021-09-21
Payer: COMMERCIAL

## 2021-09-21 ENCOUNTER — TELEPHONE (OUTPATIENT)
Dept: FAMILY MEDICINE CLINIC | Age: 56
End: 2021-09-21

## 2021-09-21 DIAGNOSIS — Z77.120 CONTACT WITH OR EXPOSURE TO MOLD: Primary | ICD-10-CM

## 2021-09-21 DIAGNOSIS — L29.9 ITCHING WITH IRRITATION: ICD-10-CM

## 2021-09-21 DIAGNOSIS — E11.8 CONTROLLED TYPE 2 DIABETES MELLITUS WITH COMPLICATION, WITHOUT LONG-TERM CURRENT USE OF INSULIN (HCC): ICD-10-CM

## 2021-09-21 DIAGNOSIS — R05.8 DRY COUGH: ICD-10-CM

## 2021-09-21 PROCEDURE — 99213 OFFICE O/P EST LOW 20 MIN: CPT | Performed by: LEGAL MEDICINE

## 2021-09-21 NOTE — TELEPHONE ENCOUNTER
Vision work  [facility] [employer]  4.8    (125) · Optometrist  [address] · In the [facility] [employer]  Closed ?  Opens 10AM Wed · [phone]   she had eye exam for DM     Please get report

## 2021-09-21 NOTE — PROGRESS NOTES
Estrella Potter is a 60 y.o. female who was seen by synchronous (real-time) audio-video technology on 9/21/2021 for Skin Problem (Itching; Mold Exposure )    Patient was exposed to mold in her appartment   She noticed mold after smelling old smell   She lived there for 8 years   Lately has been having dry cough for one month now it is better mainly  at night     Last  3 week skin itching with no rash no lesion       She has seen [facility] ophthalmology   [facility]     Assessment & Plan:   Diagnoses and all orders for this visit:    1. Contact with or exposure to mold  Patient has no signs of respiratory disease from the mold that she had to monitor for persistent cough or shortness of breath so she need further evaluation  2. Dry cough  Lasted for 3 weeks but now has resolved  3. Itching with irritation  Intermittent itching with no skin rash    4. Controlled type 2 diabetes mellitus with complication, without long-term current use of insulin ([address]    Beatties is well controlled last hemoglobin A1c is 6.3    Obesity patient has lost that weight now she weighs 205  Patient h patient has lost weight as lost    712  Subjective:        Prior to Admission medications    Medication Sig Start Date End Date Taking? Authorizing Provider   thymol/chlorophyllin (CHLOROPHYLL PO) Take  by mouth. Yes Provider, Historical   atorvastatin (LIPITOR) 40 mg tablet Take 1 Tablet by mouth daily for 90 days. 8/19/21 11/17/21 Yes Crista Randle MD   losartan (COZAAR) 100 mg tablet Take 1 tablet by mouth once daily 3/16/21  Yes Crista Randle MD   metFORMIN (GLUCOPHAGE) 500 mg tablet TAKE 1 TABLET BY MOUTH TWICE DAILY WITH MEALS 10/3/20  Yes Crista Randle MD   estradioL (ESTRACE) 0.5 mg tablet Take 1 tablet by mouth once daily 9/21/20  Yes Crista Randle MD   traMADoL (ULTRAM) 50 mg tablet Take 50 mg by mouth four (4) times daily as needed.  8/14/20  Yes Provider, Historical   atorvastatin (LIPITOR) 20 mg tablet Take 1 Tab by mouth daily for 90 days. 3/22/21 9/21/21  Rika Amezcua MD   flash glucose sensor (FreeStyle Tomy 14 Day Sensor) kit Check blood sugar 3  times daily  Patient not taking: Reported on 9/21/2021 3/9/21   Rika Amezcua MD   flash glucose scanning reader (FreeStyle Tomy 14 Day Capac) misc Use 3 times daily  Patient not taking: Reported on 9/21/2021 3/9/21   Rika Amezcua MD   dicyclomine (BENTYL) 10 mg capsule Take 1 Cap by mouth three (3) times daily for 30 days. 3/9/21 9/21/21  Rika Amezcua MD     Patient Active Problem List   Diagnosis Code    HTN (hypertension) I10    Severe obesity ([address] E66.01    Mixed hyperlipidemia E78.2    Controlled type 2 diabetes mellitus with complication, without long-term current use of insulin ([address] E11.8    Cervical radiculopathy due to degenerative joint disease of spine M47.22    Cervical radiculopathy M54.12    DAVID (generalized anxiety disorder) F41.1     Patient Active Problem List    Diagnosis Date Noted    DAVID (generalized anxiety disorder) 12/16/2019    Controlled type 2 diabetes mellitus with complication, without long-term current use of insulin ([address] 07/26/2019    Severe obesity ([address] 12/14/2018    Mixed hyperlipidemia 12/14/2018    Cervical radiculopathy due to degenerative joint disease of spine 09/06/2017    Cervical radiculopathy 09/06/2017    HTN (hypertension) 06/01/2016     Current Outpatient Medications   Medication Sig Dispense Refill    thymol/chlorophyllin (CHLOROPHYLL PO) Take  by mouth.  atorvastatin (LIPITOR) 40 mg tablet Take 1 Tablet by mouth daily for 90 days.  90 Tablet 0    losartan (COZAAR) 100 mg tablet Take 1 tablet by mouth once daily 90 Tab 1    metFORMIN (GLUCOPHAGE) 500 mg tablet TAKE 1 TABLET BY MOUTH TWICE DAILY WITH MEALS 180 Tab 0    estradioL (ESTRACE) 0.5 mg tablet Take 1 tablet by mouth once daily 30 Tab 2    traMADoL (ULTRAM) 50 mg tablet Take 50 mg by mouth four (4) times daily as needed.  flash glucose sensor (FreeStyle Tomy 14 Day Sensor) kit Check blood sugar 3  times daily (Patient not taking: Reported on 9/21/2021) 7 Kit 1    flash glucose scanning reader (FreeStyle Tomy 14 Day [place]) misc Use 3 times daily (Patient not taking: Reported on 9/21/2021) 1 Each 0     Allergies   Allergen Reactions    Flavoring Agent Other (comments)     DRY MOUTH, N&V    Crestor [Rosuvastatin] Nausea Only     Past Medical History:   Diagnosis Date    Gynecological disorder     fibroids and low test.     Hypertension     Neck pain      Past Surgical History:   Procedure Laterality Date    HX GYN      HX HYSTERECTOMY      fibroids     HX HYSTERECTOMY      removal of ovaries     Family History   Problem Relation Age of Onset    Hypertension Mother     High Cholesterol Mother     No Known Problems Father      Social History     Tobacco Use    Smoking status: Never Smoker    Smokeless tobacco: Never Used   Substance Use Topics    Alcohol use: Yes     Comment: socailly        Review of Systems   Constitutional: Negative for chills, fever, malaise/fatigue and weight loss. HENT: Negative for congestion, ear discharge, ear pain, hearing loss, nosebleeds, sinus pain and sore throat. Eyes: Negative for blurred vision, double vision and discharge. Respiratory: Positive for cough. Negative for hemoptysis, sputum production, shortness of breath and wheezing. Cardiovascular: Negative for chest pain, palpitations, claudication and leg swelling. Gastrointestinal: Negative for abdominal pain, blood in stool, constipation, diarrhea, melena, nausea and vomiting. Genitourinary: Positive for frequency. Negative for dysuria, flank pain, hematuria and urgency. Musculoskeletal: Negative for back pain, falls, joint pain, myalgias and neck pain. Skin: Positive for itching. Negative for rash.    Neurological: Negative for dizziness, tingling, sensory change, speech change, focal weakness, weakness and headaches. Psychiatric/Behavioral: Negative for depression and suicidal ideas. Objective:     Patient-Reported Vitals 9/20/2021   Patient-Reported Weight 207   Patient-Reported Height 54   Patient-Reported Systolic  -   Patient-Reported Diastolic -   Patient-Reported LMP NA      General: alert, cooperative, no distress   Mental  status: normal mood, behavior, speech, dress, motor activity, and thought processes, able to follow commands   HENT: NCAT   Neck: no visualized mass   Resp: no respiratory distress   Neuro: no gross deficits   Skin: no discoloration or lesions of concern on visible areas   Psychiatric: normal affect, consistent with stated mood, no evidence of hallucinations     Additional exam findings: We discussed the expected course, resolution and complications of the diagnosis(es) in detail. Medication risks, benefits, costs, interactions, and alternatives were discussed as indicated. I advised her to contact the office if her condition worsens, changes or fails to improve as anticipated. She expressed understanding with the diagnosis(es) and plan. Candi Sweta Wylie, was evaluated through a synchronous (real-time) audio-video encounter. The patient (or guardian if applicable) is aware that this is a billable service. Verbal consent to proceed has been obtained within the past 12 months. The visit was conducted pursuant to the emergency declaration under the [address] [place] [address] authority and the [employer] and [employer] Act. Patient identification was verified, and a caregiver was present when appropriate. The patient was located in a state where the provider was credentialed to provide care.     Sailaja Collins MD

## 2021-09-21 NOTE — PROGRESS NOTES
For virtual visit patient would like to receive link via TEXT: [phone]     Arik Hutchinson is a 60 y.o. female (: 1965) evaluated via telephone on 2021 to address:    Chief Complaint   Patient presents with    Skin Problem     Itching; Mold Exposure        Patient-Reported Vitals 2021   Patient-Reported Weight 207   Patient-Reported Height 54   Patient-Reported Systolic  -   Patient-Reported Diastolic -   Patient-Reported LMP NA        Allergies Reviewed. Learning Assessment:     Learning Assessment 10/28/2019   PRIMARY LEARNER Patient   PRIMARY LANGUAGE ENGLISH   LEARNER PREFERENCE PRIMARY PICTURES     LISTENING     READING     DEMONSTRATION     VIDEOS   ANSWERED BY patient   RELATIONSHIP SELF     Depression Screening:     3 most recent PHQ Screens 2021   Little interest or pleasure in doing things Not at all   Feeling down, depressed, irritable, or hopeless Not at all   Total Score PHQ 2 0     Fall Risk Assessment:     Fall Risk Assessment, last 12 mths 2021   Able to walk? Yes   Fall in past 12 months? 0   Do you feel unsteady? 0   Are you worried about falling 0     Abuse Screening:     Abuse Screening Questionnaire 2021   Do you ever feel afraid of your partner? N   Are you in a relationship with someone who physically or mentally threatens you? N   Is it safe for you to go home?  Y     ADL Assessment:     ADL Assessment 10/28/2019   Feeding yourself No Help Needed   Getting from bed to chair No Help Needed   Getting dressed No Help Needed   Bathing or showering No Help Needed   Walk across the room (includes cane/walker) No Help Needed   Using the telphone No Help Needed   Taking your medications No Help Needed   Preparing meals No Help Needed   Managing money (expenses/bills) No Help Needed   Moderately strenuous housework (laundry) No Help Needed   Shopping for personal items (toiletries/medicines) No Help Needed   Shopping for groceries No Help Needed   Driving No Help Needed   Climbing a flight of stairs No Help Needed   Getting to places beyond walking distances No Help Needed        Coordination of Care Questionaire:   1. Have you been to the ER, urgent care clinic since your last visit? Hospitalized since your last visit? YES 5/20/21 [facility] [address] JUDGE abdominal pain. 2. Have you seen or consulted any other health care providers outside of the [address] [place] [address] since your last visit? Include any pap smears or colon screening. YES Dr. Gen[facility]     Advanced Directive:   1. Do you have an Advanced Directive? NO    2. Would you like information on Advanced Directives?  NO

## 2021-09-22 NOTE — TELEPHONE ENCOUNTER
Doctor that completes the DM eye exams opens at 1100; will call back and get transferred to that office.

## 2021-10-14 DIAGNOSIS — E11.8 CONTROLLED TYPE 2 DIABETES MELLITUS WITH COMPLICATION, WITHOUT LONG-TERM CURRENT USE OF INSULIN (HCC): ICD-10-CM

## 2021-10-14 DIAGNOSIS — I10 ESSENTIAL HYPERTENSION: ICD-10-CM

## 2021-10-14 RX ORDER — LOSARTAN POTASSIUM 100 MG/1
TABLET ORAL
Qty: 90 TABLET | Refills: 0 | Status: SHIPPED | OUTPATIENT
Start: 2021-10-14 | End: 2022-01-21

## 2021-10-14 RX ORDER — METFORMIN HYDROCHLORIDE 500 MG/1
500 TABLET ORAL 2 TIMES DAILY WITH MEALS
Qty: 180 TABLET | Refills: 0 | Status: SHIPPED | OUTPATIENT
Start: 2021-10-14 | End: 2022-04-25

## 2021-12-24 DIAGNOSIS — E11.8 CONTROLLED TYPE 2 DIABETES MELLITUS WITH COMPLICATION, WITHOUT LONG-TERM CURRENT USE OF INSULIN (HCC): ICD-10-CM

## 2021-12-24 DIAGNOSIS — E78.2 MIXED HYPERLIPIDEMIA: ICD-10-CM

## 2021-12-30 DIAGNOSIS — E11.8 CONTROLLED TYPE 2 DIABETES MELLITUS WITH COMPLICATION, WITHOUT LONG-TERM CURRENT USE OF INSULIN (HCC): ICD-10-CM

## 2021-12-30 DIAGNOSIS — E78.2 MIXED HYPERLIPIDEMIA: ICD-10-CM

## 2021-12-30 NOTE — TELEPHONE ENCOUNTER
Patient called in regards to get a medication refill   Requested Prescriptions     Pending Prescriptions Disp Refills    atorvastatin (LIPITOR) 40 mg tablet 90 Tablet 0     Sig: Take 1 Tablet by mouth daily for 90 days.

## 2021-12-30 NOTE — TELEPHONE ENCOUNTER
Candi Shah called for their medication refill. Last Office visit:  9/21/2021 VV     Last Filled: 8/19/2021; Qty 90 w/ 0 refills     Follow up visit:  No future appointments.

## 2022-01-02 RX ORDER — ATORVASTATIN CALCIUM 40 MG/1
TABLET, FILM COATED ORAL
Qty: 90 TABLET | Refills: 0 | Status: SHIPPED | OUTPATIENT
Start: 2022-01-02 | End: 2022-04-20 | Stop reason: SDUPTHER

## 2022-01-03 RX ORDER — ATORVASTATIN CALCIUM 40 MG/1
40 TABLET, FILM COATED ORAL DAILY
Qty: 90 TABLET | Refills: 0 | OUTPATIENT
Start: 2022-01-03 | End: 2022-04-03

## 2022-01-20 DIAGNOSIS — I10 ESSENTIAL HYPERTENSION: ICD-10-CM

## 2022-01-21 RX ORDER — LOSARTAN POTASSIUM 100 MG/1
TABLET ORAL
Qty: 90 TABLET | Refills: 0 | Status: SHIPPED | OUTPATIENT
Start: 2022-01-21 | End: 2022-04-25

## 2022-03-19 PROBLEM — E78.2 MIXED HYPERLIPIDEMIA: Status: ACTIVE | Noted: 2018-12-14

## 2022-03-19 PROBLEM — M54.12 CERVICAL RADICULOPATHY: Status: ACTIVE | Noted: 2017-09-06

## 2022-03-19 PROBLEM — E11.8 CONTROLLED TYPE 2 DIABETES MELLITUS WITH COMPLICATION, WITHOUT LONG-TERM CURRENT USE OF INSULIN (HCC): Status: ACTIVE | Noted: 2019-07-26

## 2022-03-19 PROBLEM — F41.1 GAD (GENERALIZED ANXIETY DISORDER): Status: ACTIVE | Noted: 2019-12-16

## 2022-03-19 PROBLEM — M47.22 CERVICAL RADICULOPATHY DUE TO DEGENERATIVE JOINT DISEASE OF SPINE: Status: ACTIVE | Noted: 2017-09-06

## 2022-03-20 PROBLEM — E66.01 SEVERE OBESITY (HCC): Status: ACTIVE | Noted: 2018-12-14

## 2022-04-20 DIAGNOSIS — E78.2 MIXED HYPERLIPIDEMIA: ICD-10-CM

## 2022-04-20 DIAGNOSIS — E11.8 CONTROLLED TYPE 2 DIABETES MELLITUS WITH COMPLICATION, WITHOUT LONG-TERM CURRENT USE OF INSULIN (HCC): ICD-10-CM

## 2022-04-20 DIAGNOSIS — I10 ESSENTIAL HYPERTENSION: ICD-10-CM

## 2022-04-20 DIAGNOSIS — Z00.00 ANNUAL PHYSICAL EXAM: Primary | ICD-10-CM

## 2022-04-20 RX ORDER — ATORVASTATIN CALCIUM 40 MG/1
TABLET, FILM COATED ORAL
Qty: 90 TABLET | Refills: 0 | Status: SHIPPED | OUTPATIENT
Start: 2022-04-20 | End: 2022-04-25 | Stop reason: SDUPTHER

## 2022-04-20 NOTE — TELEPHONE ENCOUNTER
Last visit: 9/21/21  Next visit: No future appointments.   Last filled: 1/2/22; lipitor 40 mg tab; qty 90 w/no refill

## 2022-04-25 DIAGNOSIS — E11.8 CONTROLLED TYPE 2 DIABETES MELLITUS WITH COMPLICATION, WITHOUT LONG-TERM CURRENT USE OF INSULIN (HCC): ICD-10-CM

## 2022-04-25 DIAGNOSIS — Z00.00 ANNUAL PHYSICAL EXAM: ICD-10-CM

## 2022-04-25 DIAGNOSIS — E78.2 MIXED HYPERLIPIDEMIA: ICD-10-CM

## 2022-04-25 RX ORDER — METFORMIN HYDROCHLORIDE 500 MG/1
TABLET ORAL
Qty: 180 TABLET | Refills: 0 | Status: SHIPPED | OUTPATIENT
Start: 2022-04-25

## 2022-04-25 RX ORDER — LOSARTAN POTASSIUM 100 MG/1
TABLET ORAL
Qty: 90 TABLET | Refills: 0 | Status: SHIPPED | OUTPATIENT
Start: 2022-04-25 | End: 2022-09-07

## 2022-04-25 RX ORDER — ATORVASTATIN CALCIUM 40 MG/1
TABLET, FILM COATED ORAL
Qty: 30 TABLET | Refills: 0 | Status: SHIPPED | OUTPATIENT
Start: 2022-04-25 | End: 2022-09-07

## 2022-05-17 ENCOUNTER — APPOINTMENT (OUTPATIENT)
Dept: FAMILY MEDICINE CLINIC | Age: 57
End: 2022-05-17

## 2022-05-17 ENCOUNTER — HOSPITAL ENCOUNTER (OUTPATIENT)
Dept: LAB | Age: 57
Discharge: HOME | End: 2022-05-17
Payer: COMMERCIAL

## 2022-05-17 LAB
ALBUMIN SERPL-MCNC: 3.8 G/DL (ref 3.4–5)
ALBUMIN/GLOB SERPL: 1.1 {RATIO} (ref 0.8–1.7)
ALP SERPL-CCNC: 56 U/L (ref 45–117)
ALT SERPL-CCNC: 28 U/L (ref 13–56)
ANION GAP SERPL CALC-SCNC: 7 MMOL/L (ref 3–18)
AST SERPL-CCNC: 20 U/L (ref 10–38)
BILIRUB SERPL-MCNC: 0.5 MG/DL (ref 0.2–1)
BUN SERPL-MCNC: 13 MG/DL (ref 7–18)
BUN/CREAT SERPL: 13 (ref 12–20)
CALCIUM SERPL-MCNC: 9.2 MG/DL (ref 8.5–10.1)
CHLORIDE SERPL-SCNC: 106 MMOL/L (ref 100–111)
CHOLEST SERPL-MCNC: 153 MG/DL
CO2 SERPL-SCNC: 29 MMOL/L (ref 21–32)
CREAT SERPL-MCNC: 1 MG/DL (ref 0.6–1.3)
GLOBULIN SER CALC-MCNC: 3.4 G/DL (ref 2–4)
GLUCOSE SERPL-MCNC: 116 MG/DL (ref 74–99)
HBA1C MFR BLD: 6.4 % (ref 4.2–5.6)
HDLC SERPL-MCNC: 55 MG/DL (ref 40–60)
HDLC SERPL: 2.8 {RATIO} (ref 0–5)
LDLC SERPL CALC-MCNC: 67.8 MG/DL (ref 0–100)
LIPID PROFILE,FLP: ABNORMAL
POTASSIUM SERPL-SCNC: 4 MMOL/L (ref 3.5–5.5)
PROT SERPL-MCNC: 7.2 G/DL (ref 6.4–8.2)
SODIUM SERPL-SCNC: 142 MMOL/L (ref 136–145)
TRIGL SERPL-MCNC: 151 MG/DL (ref ?–150)
VLDLC SERPL CALC-MCNC: 30.2 MG/DL

## 2022-05-17 PROCEDURE — 80061 LIPID PANEL: CPT

## 2022-05-17 PROCEDURE — 80053 COMPREHEN METABOLIC PANEL: CPT

## 2022-05-17 PROCEDURE — 83036 HEMOGLOBIN GLYCOSYLATED A1C: CPT

## 2022-05-17 PROCEDURE — 36415 COLL VENOUS BLD VENIPUNCTURE: CPT

## 2022-05-20 ENCOUNTER — OFFICE VISIT (OUTPATIENT)
Dept: FAMILY MEDICINE CLINIC | Age: 57
End: 2022-05-20
Payer: COMMERCIAL

## 2022-05-20 VITALS
DIASTOLIC BLOOD PRESSURE: 90 MMHG | BODY MASS INDEX: 37.05 KG/M2 | TEMPERATURE: 97.9 F | HEART RATE: 81 BPM | WEIGHT: 217 LBS | HEIGHT: 64 IN | SYSTOLIC BLOOD PRESSURE: 147 MMHG | OXYGEN SATURATION: 97 %

## 2022-05-20 DIAGNOSIS — Z00.00 ANNUAL PHYSICAL EXAM: Primary | ICD-10-CM

## 2022-05-20 DIAGNOSIS — E66.09 CLASS 2 OBESITY DUE TO EXCESS CALORIES WITHOUT SERIOUS COMORBIDITY WITH BODY MASS INDEX (BMI) OF 37.0 TO 37.9 IN ADULT: ICD-10-CM

## 2022-05-20 DIAGNOSIS — E78.2 MIXED HYPERLIPIDEMIA: ICD-10-CM

## 2022-05-20 DIAGNOSIS — M47.22 CERVICAL RADICULOPATHY DUE TO DEGENERATIVE JOINT DISEASE OF SPINE: ICD-10-CM

## 2022-05-20 DIAGNOSIS — E11.8 CONTROLLED TYPE 2 DIABETES MELLITUS WITH COMPLICATION, WITHOUT LONG-TERM CURRENT USE OF INSULIN (HCC): ICD-10-CM

## 2022-05-20 PROCEDURE — 99396 PREV VISIT EST AGE 40-64: CPT | Performed by: LEGAL MEDICINE

## 2022-05-20 NOTE — PROGRESS NOTES
Nadia Hung is a 59 y.o. female (: 1965) presenting to address:    Chief Complaint   Patient presents with    Physical       Vitals:    22 1025   Temp: 97.9 °F (36.6 °C)   Weight: 217 lb (98.4 kg)   Height: 5' 4\" (1.626 m)   PainSc:  10 - Worst pain ever       Hearing/Vision:   No exam data present    Learning Assessment:     Learning Assessment 10/28/2019   PRIMARY LEARNER Patient   PRIMARY LANGUAGE ENGLISH   LEARNER PREFERENCE PRIMARY PICTURES     LISTENING     READING     DEMONSTRATION     VIDEOS   ANSWERED BY patient   RELATIONSHIP SELF     Depression Screening:     3 most recent PHQ Screens 2022   Little interest or pleasure in doing things Not at all   Feeling down, depressed, irritable, or hopeless Not at all   Total Score PHQ 2 0     Fall Risk Assessment:     Fall Risk Assessment, last 12 mths 2021   Able to walk? Yes   Fall in past 12 months? 0   Do you feel unsteady? 0   Are you worried about falling 0     Abuse Screening:     Abuse Screening Questionnaire 2021   Do you ever feel afraid of your partner? N   Are you in a relationship with someone who physically or mentally threatens you? N   Is it safe for you to go home?  Y     ADL Assessment:     ADL Assessment 10/28/2019   Feeding yourself No Help Needed   Getting from bed to chair No Help Needed   Getting dressed No Help Needed   Bathing or showering No Help Needed   Walk across the room (includes cane/walker) No Help Needed   Using the telphone No Help Needed   Taking your medications No Help Needed   Preparing meals No Help Needed   Managing money (expenses/bills) No Help Needed   Moderately strenuous housework (laundry) No Help Needed   Shopping for personal items (toiletries/medicines) No Help Needed   Shopping for groceries No Help Needed   Driving No Help Needed   Climbing a flight of stairs No Help Needed   Getting to places beyond walking distances No Help Needed        Coordination of Care Questionaire: 1. \"Have you been to the ER, urgent care clinic since your last visit? Hospitalized since your last visit? \" No    2. \"Have you seen or consulted any other health care providers outside of the [address] [place] [address] since your last visit? \" No     3. For patients aged 39-70: Has the patient had a colonoscopy / FIT/ Cologuard? Yes - no Care Gap present      If the patient is female:    4. For patients aged 41-77: Has the patient had a mammogram within the past 2 years? Yes - no Care Gap present  See top three    5. For patients aged 21-65: Has the patient had a pap smear? Yes - no Care Gap present    Advanced Directive:   1. Do you have an Advanced Directive? NO    2. Would you like information on Advanced Directives?  NO

## 2022-05-20 NOTE — PROGRESS NOTES
[address] [place] [address]     Chief Complaint   Patient presents with    Physical     Vitals:    05/20/22 1025   BP: (!) 147/90   Pulse: 81   Temp: 97.9 °F (36.6 °C)   SpO2: 97%   Weight: 217 lb (98.4 kg)   Height: 5' 4\" (1.626 m)   PainSc:  10 - Worst pain ever         HPI: is here for CPE   Lab results D/W pateint   HBa1c  Is  6.4 diabetes is well controlled     She has been having cervical radiculopathy and is seeing ortho and ain management     Pain management ortho   Sonny Hess MD  [address] [place] [address]  -     Vivek Candelaria          fully vaccinated for COVID19     Shingles done at [facility]     Past Medical History:   Diagnosis Date    Gynecological disorder     fibroids and low test.     Hypertension     Neck pain      Past Surgical History:   Procedure Laterality Date    HX GYN      HX HYSTERECTOMY      fibroids     HX HYSTERECTOMY      removal of ovaries     Social History     Tobacco Use    Smoking status: Never Smoker    Smokeless tobacco: Never Used   Substance Use Topics    Alcohol use: Yes     Comment: socailly        Family History   Problem Relation Age of Onset    Hypertension Mother     High Cholesterol Mother     No Known Problems Father        Review of Systems   Constitutional: Negative for chills, fever, malaise/fatigue and weight loss. HENT: Negative for congestion, ear discharge, ear pain, hearing loss, nosebleeds and sinus pain. Eyes: Negative for blurred vision, double vision and discharge. Respiratory: Negative for cough, hemoptysis, sputum production, shortness of breath, wheezing and stridor. Cardiovascular: Negative for chest pain, palpitations, claudication and leg swelling. Gastrointestinal: Negative for abdominal pain, blood in stool, constipation, diarrhea, melena, nausea and vomiting. Genitourinary: Negative for dysuria, flank pain, frequency, hematuria and urgency.    Musculoskeletal: Negative for back pain, falls, joint pain, myalgias and neck pain. Skin: Negative for itching and rash. Neurological: Negative for dizziness, tingling, sensory change, speech change, focal weakness, seizures, loss of consciousness, weakness and headaches. Psychiatric/Behavioral: Negative for depression and suicidal ideas. Physical Exam  Vitals and nursing note reviewed. Constitutional:       General: She is not in acute distress. Appearance: She is well-developed. She is not diaphoretic. HENT:      Head: Normocephalic and atraumatic. Right Ear: Tympanic membrane, ear canal and external ear normal. There is no impacted cerumen. Left Ear: Tympanic membrane, ear canal and external ear normal. There is no impacted cerumen. Eyes:      General: No scleral icterus. Neck:      Thyroid: No thyromegaly. Cardiovascular:      Rate and Rhythm: Normal rate and regular rhythm. Heart sounds: Normal heart sounds. Pulmonary:      Effort: Pulmonary effort is normal. No respiratory distress. Breath sounds: Normal breath sounds. No wheezing or rales. Chest:      Chest wall: No tenderness. Abdominal:      General: There is no distension. Palpations: Abdomen is soft. There is no mass. Tenderness: There is no abdominal tenderness. There is no right CVA tenderness, left CVA tenderness, guarding or rebound. Hernia: No hernia is present. Musculoskeletal:         General: No tenderness or deformity. Normal range of motion. Right lower leg: No edema. Left lower leg: No edema. Lymphadenopathy:      Cervical: No cervical adenopathy. Skin:     General: Skin is warm and dry. Coloration: Skin is not jaundiced or pale. Findings: No bruising, erythema or rash. Neurological:      General: No focal deficit present. Mental Status: She is alert and oriented to person, place, and time. Cranial Nerves: No cranial nerve deficit. Motor: No weakness.       Coordination: Coordination normal. Gait: Gait normal.   Psychiatric:         Mood and Affect: Mood normal.         Behavior: Behavior normal.         Thought Content: Thought content normal.         Judgment: Judgment normal.          Assessment and plan     Plan of care has been discussed with the patient, he agrees to the plan and verbalized understanding. All his questions were answered  More than 50% of the time spent in this visit was counseling the patient about  illness and treatment options         1. Annual physical exam      2. Controlled type 2 diabetes mellitus with complication, without long-term current use of insulin (HCC)  Continue same treatment  With metformin and lifestyle modifications   HB a1c is 6.4  3. Mixed hyperlipidemia  Adherent to statin   LDL has improved goal is to be below 100    4. Cervical radiculopathy due to degenerative joint disease of spine    5. Class 2 obesity due to excess calories without serious comorbidity with body mass index (BMI) of 37.0 to 37.9 in adult  Lifestyle modification has been discussed with patient in details, decrease carbohydrates, decrease or eliminate sugar intake, gradually increase physical activity as tolerated to be about 4 to 5 hours a week. Current Outpatient Medications   Medication Sig Dispense Refill    losartan (COZAAR) 100 mg tablet Take 1 tablet by mouth once daily 90 Tablet 0    atorvastatin (LIPITOR) 40 mg tablet Take 1 tablet by mouth once daily 30 Tablet 0    metFORMIN (GLUCOPHAGE) 500 mg tablet TAKE 1 TABLET BY MOUTH TWICE DAILY WITH MEALS 180 Tablet 0    flash glucose sensor (FreeStyle Tomy 14 Day Sensor) kit Check blood sugar 3  times daily 7 Kit 1    flash glucose scanning reader (FreeStyle Tomy 14 Day [place]) misc Use 3 times daily 1 Each 0    estradioL (ESTRACE) 0.5 mg tablet Take 1 tablet by mouth once daily 30 Tab 2    traMADoL (ULTRAM) 50 mg tablet Take 50 mg by mouth four (4) times daily as needed.          Patient Active Problem List    Diagnosis Date Noted    DAVID (generalized anxiety disorder) 12/16/2019    Controlled type 2 diabetes mellitus with complication, without long-term current use of insulin ([facility] [address] 07/26/2019    Severe obesity ([facility] [address] 12/14/2018    Mixed hyperlipidemia 12/14/2018    Cervical radiculopathy due to degenerative joint disease of spine 09/06/2017    Cervical radiculopathy 09/06/2017    HTN (hypertension) 06/01/2016     Results for orders placed or performed during the hospital encounter of 05/17/22   LIPID PANEL   Result Value Ref Range    LIPID PROFILE          Cholesterol, total 153 <200 MG/DL    Triglyceride 151 (H) <150 MG/DL    HDL Cholesterol 55 40 - 60 MG/DL    LDL, calculated 67.8 0 - 100 MG/DL    VLDL, calculated 30.2 MG/DL    CHOL/HDL Ratio 2.8 0 - 5.0     METABOLIC PANEL, COMPREHENSIVE   Result Value Ref Range    Sodium 142 136 - 145 mmol/L    Potassium 4.0 3.5 - 5.5 mmol/L    Chloride 106 100 - 111 mmol/L    CO2 29 21 - 32 mmol/L    Anion gap 7 3.0 - 18 mmol/L    Glucose 116 (H) 74 - 99 mg/dL    BUN 13 7.0 - 18 MG/DL    Creatinine 1.00 0.6 - 1.3 MG/DL    BUN/Creatinine ratio 13 12 - 20      GFR est AA >60 >60 ml/min/1.73m2    GFR est non-AA 57 (L) >60 ml/min/1.73m2    Calcium 9.2 8.5 - 10.1 MG/DL    Bilirubin, total 0.5 0.2 - 1.0 MG/DL    ALT (SGPT) 28 13 - 56 U/L    AST (SGOT) 20 10 - 38 U/L    Alk. phosphatase 56 45 - 117 U/L    Protein, total 7.2 6.4 - 8.2 g/dL    Albumin 3.8 3.4 - 5.0 g/dL    Globulin 3.4 2.0 - 4.0 g/dL    A-G Ratio 1.1 0.8 - 1.7     HEMOGLOBIN A1C W/O EAG   Result Value Ref Range    Hemoglobin A1c 6.4 (H) 4.2 - 5.6 %     Hospital Outpatient Visit on 05/17/2022   Component Date Value Ref Range Status    LIPID PROFILE 05/17/2022        Final    Cholesterol, total 05/17/2022 153  <200 MG/DL Final    Triglyceride 05/17/2022 151* <150 MG/DL Final    Comment: The drugs N-acetylcysteine (NAC) and  Metamiszole have been found to cause falsely  low results in this chemical assay.  Please  be sure to submit blood samples obtained  BEFORE administration of either of these  drugs to assure correct results.  HDL Cholesterol 05/17/2022 55  40 - 60 MG/DL Final    LDL, calculated 05/17/2022 67.8  0 - 100 MG/DL Final    VLDL, calculated 05/17/2022 30.2  MG/DL Final    CHOL/HDL Ratio 05/17/2022 2.8  0 - 5.0   Final    Sodium 05/17/2022 142  136 - 145 mmol/L Final    Potassium 05/17/2022 4.0  3.5 - 5.5 mmol/L Final    Chloride 05/17/2022 106  100 - 111 mmol/L Final    CO2 05/17/2022 29  21 - 32 mmol/L Final    Anion gap 05/17/2022 7  3.0 - 18 mmol/L Final    Glucose 05/17/2022 116* 74 - 99 mg/dL Final    BUN 05/17/2022 13  7.0 - 18 MG/DL Final    Creatinine 05/17/2022 1.00  0.6 - 1.3 MG/DL Final    BUN/Creatinine ratio 05/17/2022 13  12 - 20   Final    GFR est AA 05/17/2022 >60  >60 ml/min/1.73m2 Final    GFR est non-AA 05/17/2022 57* >60 ml/min/1.73m2 Final    Comment: (NOTE)  Estimated GFR is calculated using the Modification of Diet in Renal   Disease (MDRD) Study equation, reported for both  Americans   (GFRAA) and non- Americans (GFRNA), and normalized to 1.73m2   body surface area. The physician must decide which value applies to   the patient. The MDRD study equation should only be used in   individuals age 25 or older. It has not been validated for the   following: pregnant women, patients with serious comorbid conditions,   or on certain medications, or persons with extremes of body size,   muscle mass, or nutritional status.  Calcium 05/17/2022 9.2  8.5 - 10.1 MG/DL Final    Bilirubin, total 05/17/2022 0.5  0.2 - 1.0 MG/DL Final    ALT (SGPT) 05/17/2022 28  13 - 56 U/L Final    AST (SGOT) 05/17/2022 20  10 - 38 U/L Final    Alk.  phosphatase 05/17/2022 56  45 - 117 U/L Final    Protein, total 05/17/2022 7.2  6.4 - 8.2 g/dL Final    Albumin 05/17/2022 3.8  3.4 - 5.0 g/dL Final    Globulin 05/17/2022 3.4  2.0 - 4.0 g/dL Final    A-G Ratio 05/17/2022 1.1  0.8 - 1.7 Final    Hemoglobin A1c 05/17/2022 6.4* 4.2 - 5.6 % Final    Comment: (NOTE)  HbA1C Interpretive Ranges  <5.7              Normal  5.7 - 6.4         Consider Prediabetes  >6.5              Consider Diabetes            Follow-up and Dispositions    · Return in about 6 months (around 11/20/2022).

## 2022-09-06 ENCOUNTER — TRANSCRIBE ORDER (OUTPATIENT)
Dept: SCHEDULING | Age: 57
End: 2022-09-06

## 2022-09-06 DIAGNOSIS — E11.8 CONTROLLED TYPE 2 DIABETES MELLITUS WITH COMPLICATION, WITHOUT LONG-TERM CURRENT USE OF INSULIN (HCC): ICD-10-CM

## 2022-09-06 DIAGNOSIS — I10 ESSENTIAL HYPERTENSION: ICD-10-CM

## 2022-09-06 DIAGNOSIS — Z12.31 VISIT FOR SCREENING MAMMOGRAM: Primary | ICD-10-CM

## 2022-09-06 DIAGNOSIS — E78.2 MIXED HYPERLIPIDEMIA: ICD-10-CM

## 2022-09-07 ENCOUNTER — HOSPITAL ENCOUNTER (OUTPATIENT)
Dept: MAMMOGRAPHY | Age: 57
Discharge: HOME | End: 2022-09-07
Attending: LEGAL MEDICINE
Payer: COMMERCIAL

## 2022-09-07 DIAGNOSIS — Z12.31 VISIT FOR SCREENING MAMMOGRAM: ICD-10-CM

## 2022-09-07 PROCEDURE — 77067 SCR MAMMO BI INCL CAD: CPT

## 2022-09-07 RX ORDER — LOSARTAN POTASSIUM 100 MG/1
TABLET ORAL
Qty: 90 TABLET | Refills: 0 | Status: SHIPPED | OUTPATIENT
Start: 2022-09-07

## 2022-09-07 RX ORDER — ATORVASTATIN CALCIUM 40 MG/1
TABLET, FILM COATED ORAL
Qty: 30 TABLET | Refills: 0 | Status: SHIPPED | OUTPATIENT
Start: 2022-09-07 | End: 2022-10-25

## 2022-10-07 NOTE — PROGRESS NOTES
Name: Rahul Ramos      : 1945      MRN: [id number]  Encounter Provider: Ricci Monge DO  Encounter Date: 10/7/2022   Encounter department: [address] [place] [address]  Mild cognitive impairment  -     CBC and differential; Future  -     Vitamin B12; Future  -     Folate; Future  -     TSH, 3rd generation with Free T4 reflex; Future  -     MRI brain wo contrast; Future; Expected date: 10/07/2022    Patient scored 30/30 on MMSE  We also completed the \[facility] [place] [facility]\"" cognitive Assessment  MOCA score was 22/30  She did well with the visuospatial and executive portion of exam and was able to copy cube and draw a clock  She struggled with the memory and recall  She also did poorly on language fluency  She did not know today's date  Will check labs to rule out metabolic etiology of her memory issues and will check MRI to rule out vascular etiology  Referral to neurology placed  2  Syncope and collapse  -     POCT ECG  Unwitnessed episode of what sounds like a syncopal episode at home today  Not orthostatic in office today  EKG done in office  Normal sinus rhythm at 88 bpm  No acute changes  Check cbc  Refer cardiology  3  Stage 3a chronic kidney disease ([address] )    4  Essential hypertension  -     captopril (CAPOTEN) 25 mg tablet; Take 1 tablet (25 mg total) by mouth 2 (two) times a day  bp in office today is low at 90/60  Rechecked x 2  Will decrease her capoten from 50 mg bid as she has been taking to 25 mg bid  Recheck 1 month  5  Unspecified symptoms and signs involving cognitive functions and awareness   -     Vitamin B12; Future  -     Folate; Future           Subjective     HPI     Patient is a 76year old female with hypertension, hyperlipidemia, hypothyroidism, vitamin D deficiency, mild renal insufficiency here today for concerns with memory  She is accompanied to today's visit by her daughter       Patient states that she doesn't remember as There is no mammographic evidence of malignancy. A 1 year screening mammogram is   recommended. well as she used to  Specifically struggles with short term memory  Will forget conversations that she has with people  Never misplaces objects or keys  Has never got lost while driving  She does not do finances ( does)  Daughter states that she tends to repeat herself  She denies feeling depressed at all  Also reports passing out at home this morning  States that she was in kitchen, standing out the counter ready to make coffee and just "fell to the ground"  She denies feeling poorly prior to the episode  She denies feeling dizzy or lightheaded prior  No palpitations  No chest pain  The event was not witnessed  Not sure if she did actually lose consciousness  Did not bump her head  Got right back up and felt perfectly fine afterwards  This is first time something like this has happened to her               Review of Systems    Past Medical History:   Diagnosis Date    Disease of thyroid gland     Hyperlipidemia     Hypertension     Hypothyroidism      Past Surgical History:   Procedure Laterality Date    BREAST CYST EXCISION Left     TONSILLECTOMY      TOTAL KNEE ARTHROPLASTY Right 02/04/2022    Dr Jaja Moraes     Family History   Problem Relation Age of Onset    Stroke Mother     Heart disease Father    Amelia Mare Cancer Sister     No Known Problems Daughter     No Known Problems Daughter     No Known Problems Maternal Grandmother     No Known Problems Maternal Grandfather     No Known Problems Paternal Grandmother     No Known Problems Paternal Grandfather      Social History     Socioeconomic History    Marital status: /Civil Union     Spouse name: None    Number of children: None    Years of education: None    Highest education level: None   Occupational History    None   Tobacco Use    Smoking status: Former Smoker     Packs/day: 0 50     Years: 10 00     Pack years: 5 00    Smokeless tobacco: Never Used   Vaping Use    Vaping Use: Never used   Substance and Sexual Activity  Alcohol use: Yes     Comment: socially    Drug use: No    Sexual activity: Not Currently   Other Topics Concern    None   Social History Narrative    Mother of two adult children        Retired     Social Determinants of Health     Financial Resource Strain: Not on file   Food Insecurity: Not on file   Transportation Needs: Not on file   Physical Activity: Not on file   Stress: Not on file   Social Connections: Not on file   Intimate Partner Violence: Not on file   Housing Stability: Not on file     Current Outpatient Medications on File Prior to Visit   Medication Sig    amLODIPine (NORVASC) 5 mg tablet TAKE 1 TABLET BY MOUTH  DAILY    aspirin 81 mg chewable tablet Chew 81 mg daily    atorvastatin (LIPITOR) 20 mg tablet Take 20 mg by mouth daily    calcium carbonate-vitamin D (OSCAL-D) 500 mg-200 units per tablet Take 1 tablet by mouth 2 (two) times a day with meals    folic acid (FOLVITE) 1 mg tablet Take by mouth daily    levothyroxine 125 mcg tablet TAKE 1 TABLET BY MOUTH  DAILY    [DISCONTINUED] captopril (CAPOTEN) 50 mg tablet Take 1 tablet (50 mg total) by mouth in the morning and 1 tablet (50 mg total) in the evening  Morning and evening       No Known Allergies  Immunization History   Administered Date(s) Administered    COVID-19 MODERNA VACC 0 25 ML IM BOOSTER 12/08/2021    COVID-19 MODERNA VACC 0 5 ML IM 02/04/2021, 03/02/2021    Fluzone Split Quad 0 5 mL 10/16/2017    INFLUENZA 12/14/2011, 10/16/2012, 10/16/2017, 10/25/2018, 01/03/2020, 09/25/2020    Influenza Split High Dose Preservative Free IM 09/25/2020    Pneumococcal Conjugate 13-Valent 10/04/2016    Pneumococcal Polysaccharide PPV23 10/16/2012       Objective     BP 90/60   Pulse 74   Temp 99 2 °F (37 3 °C) (Tympanic)   Ht 5' 7 8" (1 722 m)   Wt 80 7 kg (178 lb)   SpO2 96%   BMI 27 22 kg/m²     Physical Exam  Ricci Monge DO

## 2022-10-13 ENCOUNTER — APPOINTMENT (OUTPATIENT)
Dept: FAMILY MEDICINE CLINIC | Age: 57
End: 2022-10-13

## 2022-10-13 ENCOUNTER — OFFICE VISIT (OUTPATIENT)
Dept: FAMILY MEDICINE CLINIC | Age: 57
End: 2022-10-13
Payer: COMMERCIAL

## 2022-10-13 ENCOUNTER — HOSPITAL ENCOUNTER (OUTPATIENT)
Dept: LAB | Age: 57
Discharge: HOME | End: 2022-10-13
Payer: COMMERCIAL

## 2022-10-13 VITALS
WEIGHT: 223.8 LBS | DIASTOLIC BLOOD PRESSURE: 84 MMHG | HEART RATE: 103 BPM | OXYGEN SATURATION: 94 % | RESPIRATION RATE: 16 BRPM | TEMPERATURE: 97 F | BODY MASS INDEX: 38.21 KG/M2 | HEIGHT: 64 IN | SYSTOLIC BLOOD PRESSURE: 160 MMHG

## 2022-10-13 DIAGNOSIS — E11.8 CONTROLLED TYPE 2 DIABETES MELLITUS WITH COMPLICATION, WITHOUT LONG-TERM CURRENT USE OF INSULIN (HCC): ICD-10-CM

## 2022-10-13 DIAGNOSIS — I10 ESSENTIAL HYPERTENSION: Primary | ICD-10-CM

## 2022-10-13 DIAGNOSIS — M54.50 BILATERAL LOW BACK PAIN, UNSPECIFIED CHRONICITY, UNSPECIFIED WHETHER SCIATICA PRESENT: ICD-10-CM

## 2022-10-13 DIAGNOSIS — M47.22 CERVICAL RADICULOPATHY DUE TO DEGENERATIVE JOINT DISEASE OF SPINE: ICD-10-CM

## 2022-10-13 DIAGNOSIS — N64.4 BREAST PAIN, LEFT: ICD-10-CM

## 2022-10-13 PROCEDURE — 3044F HG A1C LEVEL LT 7.0%: CPT | Performed by: LEGAL MEDICINE

## 2022-10-13 PROCEDURE — 99214 OFFICE O/P EST MOD 30 MIN: CPT | Performed by: LEGAL MEDICINE

## 2022-10-13 PROCEDURE — 80048 BASIC METABOLIC PNL TOTAL CA: CPT

## 2022-10-13 PROCEDURE — 36415 COLL VENOUS BLD VENIPUNCTURE: CPT

## 2022-10-13 PROCEDURE — 83036 HEMOGLOBIN GLYCOSYLATED A1C: CPT

## 2022-10-13 PROCEDURE — 82043 UR ALBUMIN QUANTITATIVE: CPT

## 2022-10-13 RX ORDER — CYCLOBENZAPRINE HCL 10 MG
TABLET ORAL
COMMUNITY
Start: 2022-09-06
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
## 2022-10-14 LAB
ANION GAP SERPL CALC-SCNC: 6 MMOL/L (ref 3–18)
BUN SERPL-MCNC: 12 MG/DL (ref 7–18)
BUN/CREAT SERPL: 11 (ref 12–20)
CALCIUM SERPL-MCNC: 10.1 MG/DL (ref 8.5–10.1)
CHLORIDE SERPL-SCNC: 103 MMOL/L (ref 100–111)
CO2 SERPL-SCNC: 29 MMOL/L (ref 21–32)
CREAT SERPL-MCNC: 1.09 MG/DL (ref 0.6–1.3)
CREAT UR-MCNC: 72 MG/DL (ref 30–125)
GLUCOSE SERPL-MCNC: 202 MG/DL (ref 74–99)
HBA1C MFR BLD: 7.1 % (ref 4.2–5.6)
MICROALBUMIN UR-MCNC: 10.1 MG/DL (ref 0–3)
MICROALBUMIN/CREAT UR-RTO: 140 MG/G (ref 0–30)
POTASSIUM SERPL-SCNC: 4.3 MMOL/L (ref 3.5–5.5)
SODIUM SERPL-SCNC: 138 MMOL/L (ref 136–145)

## 2022-10-16 NOTE — PROGRESS NOTES
Urine  test shows worsening in diabetes control and indicate early kidney disease that also reflected by Kidney function she has stage 3 kidney diease that improves by healthier lifestyle ,control DM and hypertension and avoid all NSAIDS (Ibuprofen aleve ,motrin ,naproxen ) to take tylenol as needed   HBa1c has incresed from 6.4 to 7.1 still considered well-controlled diabetes by A1c measurements  Just due to the early signs of kidney disease patient need to be adherent to lifestyle and to work on better control diabetes and hypertension

## 2022-10-17 ENCOUNTER — TELEPHONE (OUTPATIENT)
Dept: FAMILY MEDICINE CLINIC | Age: 57
End: 2022-10-17

## 2022-10-18 NOTE — PROGRESS NOTES
Informed pt of lab results and tx plan; pt has questions for PCP and scheduled on: Future Appointments  11/17/2022 7:30 AM    Mago Villanueva MD      BSMA                BS AMB

## 2022-10-23 DIAGNOSIS — E11.8 CONTROLLED TYPE 2 DIABETES MELLITUS WITH COMPLICATION, WITHOUT LONG-TERM CURRENT USE OF INSULIN (HCC): ICD-10-CM

## 2022-10-23 DIAGNOSIS — E78.2 MIXED HYPERLIPIDEMIA: ICD-10-CM

## 2022-10-25 RX ORDER — ATORVASTATIN CALCIUM 40 MG/1
40 TABLET, FILM COATED ORAL DAILY
Qty: 90 TABLET | Refills: 3 | Status: SHIPPED | OUTPATIENT
Start: 2022-10-25 | End: 2023-01-23

## 2022-10-31 ENCOUNTER — TELEPHONE (OUTPATIENT)
Dept: FAMILY MEDICINE CLINIC | Age: 57
End: 2022-10-31

## 2022-10-31 ENCOUNTER — VIRTUAL VISIT (OUTPATIENT)
Dept: FAMILY MEDICINE CLINIC | Age: 57
End: 2022-10-31
Payer: COMMERCIAL

## 2022-10-31 DIAGNOSIS — N18.30 CKD STAGE 3 DUE TO TYPE 2 DIABETES MELLITUS (HCC): ICD-10-CM

## 2022-10-31 DIAGNOSIS — I10 ESSENTIAL HYPERTENSION: ICD-10-CM

## 2022-10-31 DIAGNOSIS — E11.22 CKD STAGE 3 DUE TO TYPE 2 DIABETES MELLITUS (HCC): ICD-10-CM

## 2022-10-31 DIAGNOSIS — E11.8 CONTROLLED TYPE 2 DIABETES MELLITUS WITH COMPLICATION, WITHOUT LONG-TERM CURRENT USE OF INSULIN (HCC): Primary | ICD-10-CM

## 2022-10-31 PROCEDURE — 99213 OFFICE O/P EST LOW 20 MIN: CPT | Performed by: LEGAL MEDICINE

## 2022-10-31 PROCEDURE — 3051F HG A1C>EQUAL 7.0%<8.0%: CPT | Performed by: LEGAL MEDICINE

## 2022-10-31 NOTE — PROGRESS NOTES
Candi Young is a 59 y.o. female who was seen by synchronous (real-time) audio-video technology on 10/31/2022 for Results    Patient is here for follow-up after lab results for diabetes and decreased EGFR    In the last 3 weeks she started [facility]   Has lost about 20 pounds she is following up with Dietitian   at the same center   Taking lipotropic supplement  She weight now 203 after has been enrolled with them for 3 week   121 blood sugar Fasting   134 after lunch   BP is 120/70         Assessment & Plan:   Diagnoses and all orders for this visit:    1. Controlled type 2 diabetes mellitus with complication, without long-term current use of insulin ([facility])  Last HB a1c is 7.1  She is adherent to metformin as well as lifestyle modifications  2. CKD stage 3 due to type 2 diabetes mellitus ([address]  We discussed with the patient the importance of adherence to medication lifestyle modifications  And controlling diabetes will help to reverse the kidney function returned normal  3. Essential hypertension    Blood pressure readings at home has been well controlled      Subjective:       Prior to Admission medications    Medication Sig Start Date End Date Taking? Authorizing Provider   atorvastatin (LIPITOR) 40 mg tablet Take 1 Tablet by mouth daily for 90 days.  10/25/22 1/23/23 Yes Skyla Aguilera MD   cyclobenzaprine (FLEXERIL) 10 mg tablet TAKE 1 TABLET BY MOUTH EVERY 8 HOURS AS NEEDED FOR MUSCLE SPASM -DO NOT TAKE WHILE WORKING OR DRIVING 7/9/58  Yes Provider, Historical   losartan (COZAAR) 100 mg tablet Take 1 tablet by mouth once daily 9/7/22  Yes Skyla Aguilera MD   metFORMIN (GLUCOPHAGE) 500 mg tablet TAKE 1 TABLET BY MOUTH TWICE DAILY WITH MEALS 4/25/22  Yes Skyla Aguilera MD   flash glucose sensor (FreeStyle Tomy 14 Day Sensor) kit Check blood sugar 3  times daily 3/9/21  Yes Skyla Aguilera MD   estradioL (ESTRACE) 0.5 mg tablet Take 1 tablet by mouth once daily 9/21/20  Yes Helene Osgood, MD   traMADoL (ULTRAM) 50 mg tablet Take 50 mg by mouth four (4) times daily as needed. 8/14/20  Yes Provider, Historical   flash glucose scanning reader (FreeStyle Tomy 14 Day [facility]) misc Use 3 times daily 3/9/21 10/31/22  Helene Osgood, MD     Patient Active Problem List   Diagnosis Code    HTN (hypertension) I10    Severe obesity ([address] E66.01    Mixed hyperlipidemia E78.2    Controlled type 2 diabetes mellitus with complication, without long-term current use of insulin ([address] E11.8    Cervical radiculopathy due to degenerative joint disease of spine M47.22    Cervical radiculopathy M54.12    DAVID (generalized anxiety disorder) F41.1     Patient Active Problem List    Diagnosis Date Noted    DAVID (generalized anxiety disorder) 12/16/2019    Controlled type 2 diabetes mellitus with complication, without long-term current use of insulin ([address] 07/26/2019    Severe obesity ([address] 12/14/2018    Mixed hyperlipidemia 12/14/2018    Cervical radiculopathy due to degenerative joint disease of spine 09/06/2017    Cervical radiculopathy 09/06/2017    HTN (hypertension) 06/01/2016     Current Outpatient Medications   Medication Sig Dispense Refill    atorvastatin (LIPITOR) 40 mg tablet Take 1 Tablet by mouth daily for 90 days. 90 Tablet 3    cyclobenzaprine (FLEXERIL) 10 mg tablet TAKE 1 TABLET BY MOUTH EVERY 8 HOURS AS NEEDED FOR MUSCLE SPASM -DO NOT TAKE WHILE WORKING OR DRIVING      losartan (COZAAR) 100 mg tablet Take 1 tablet by mouth once daily 90 Tablet 0    metFORMIN (GLUCOPHAGE) 500 mg tablet TAKE 1 TABLET BY MOUTH TWICE DAILY WITH MEALS 180 Tablet 0    flash glucose sensor (FreeStyle Tomy 14 Day Sensor) kit Check blood sugar 3  times daily 7 Kit 1    estradioL (ESTRACE) 0.5 mg tablet Take 1 tablet by mouth once daily 30 Tab 2    traMADoL (ULTRAM) 50 mg tablet Take 50 mg by mouth four (4) times daily as needed.        Allergies   Allergen Reactions    Flavoring Agent Other (comments) DRY MOUTH, N&V    Apple Nausea Only     Pt gets  cotton mouth and nausea    Crestor [Rosuvastatin] Nausea Only     Past Medical History:   Diagnosis Date    Gynecological disorder     fibroids and low test.     Hypertension     Neck pain      Past Surgical History:   Procedure Laterality Date    HX GYN      HX HYSTERECTOMY      fibroids     HX HYSTERECTOMY      removal of ovaries     Family History   Problem Relation Age of Onset    Hypertension Mother     High Cholesterol Mother     No Known Problems Father      Social History     Tobacco Use    Smoking status: Never    Smokeless tobacco: Never   Substance Use Topics    Alcohol use: Yes     Comment: socailly        Review of Systems   Constitutional:  Negative for chills, fever, malaise/fatigue and weight loss. HENT:  Negative for congestion, ear discharge, ear pain, hearing loss, nosebleeds, sinus pain and sore throat. Eyes:  Negative for blurred vision, double vision and discharge. Respiratory:  Negative for cough, hemoptysis, sputum production, shortness of breath, wheezing and stridor. Cardiovascular:  Negative for chest pain, palpitations, claudication and leg swelling. Gastrointestinal:  Negative for abdominal pain, constipation, diarrhea, nausea and vomiting. Genitourinary:  Negative for dysuria, frequency and urgency. Musculoskeletal:  Negative for back pain, falls, joint pain, myalgias and neck pain. Skin:  Negative for itching and rash. Neurological:  Negative for dizziness, tingling, sensory change, speech change, focal weakness, weakness and headaches. Psychiatric/Behavioral:  Negative for depression and suicidal ideas.       Objective:     Patient-Reported Vitals 9/20/2021   Patient-Reported Weight 207   Patient-Reported Height 54   Patient-Reported Systolic  -   Patient-Reported Diastolic -   Patient-Reported LMP NA      General: alert, cooperative, no distress   Mental  status: normal mood, behavior, speech, dress, motor activity, and thought processes, able to follow commands   HENT: NCAT   Neck: no visualized mass   Resp: no respiratory distress   Neuro: no gross deficits   Skin: no discoloration or lesions of concern on visible areas   Psychiatric: normal affect, consistent with stated mood, no evidence of hallucinations     Additional exam findings: We discussed the expected course, resolution and complications of the diagnosis(es) in detail. Medication risks, benefits, costs, interactions, and alternatives were discussed as indicated. I advised her to contact the office if her condition worsens, changes or fails to improve as anticipated. She expressed understanding with the diagnosis(es) and plan. Candi Carrera, was evaluated through a synchronous (real-time) audio-video encounter. The patient (or guardian if applicable) is aware that this is a billable service, which includes applicable co-pays. This Virtual Visit was conducted with patient's (and/or legal guardian's) consent. The visit was conducted pursuant to the emergency declaration under the [address] [place] [address] authority and the [employer] Act. Patient identification was verified, and a caregiver was present when appropriate. The patient was located at: Home: [facility] [address] [place] [id number]  The provider was located at:  [facility]        Julia Cesar MD

## 2022-10-31 NOTE — TELEPHONE ENCOUNTER
Pt had a question while she was checking in for her VV appt today. She stated that she's spoken to the office 4 times about her [place] plan. They are telling her Dr Gary Laureano is out of network.

## 2022-11-02 NOTE — TELEPHONE ENCOUNTER
Called and spoke with patient. She wanted to take the losartan 25 mg that she was taking before you switched her to Lotrel. You previously were the prescriber of this medication.
Contacted patient, two patient identifier confirmed. Informed patient that Losartan 100 mg was sent to pharmacy instead of 50 mg that was requested. Patient stated she felt as if the 100 mg was a little too much. I asked the patient what was her last BP reading. Patient stated it was 139/75 two days ago. Advised MD in patient's BP and MD stated for the patient to take 1/2 a pill and follow up in two weeks. Patient verbalized understanding and stated she will call back when she has a definite day to be seen.
Patient got started on Lotrel 5/20 because losartan 50 mg was not controlling her symptoms if patient preferred to go back to losartan it has to be on 100 mg daily?
Regarding: Prescription Question  Contact: [phone]  ----- Message from [address] [place] [address] [address], Generic sent at 6/30/2019 11:21 AM EDT -----    I don't understand, This  WAS prescribed by  When the 25g was thought to be a recall. I called your office to let you know from the pharmacy that [facility] discontinued the previous one. I never would have gotten the new one if you had not ok'd it. I'm currently out of meds for this now and this delay is risking my health. I need someone to call me  ASAP.    Thank you    Nilda Jordan
No respiratory distress. No stridor, mild exp wheeze with decreased air entry in lower lung fields

## 2022-11-08 ENCOUNTER — TELEPHONE (OUTPATIENT)
Dept: FAMILY MEDICINE CLINIC | Age: 57
End: 2022-11-08

## 2022-11-08 NOTE — TELEPHONE ENCOUNTER
[employer] representative called on behalf of the pt stating that the pt is receiving a bill from Dr Lin due to the wrong tax ID number that being put in for the pt's visits. The tax ID that is being used is [id number] and this populates as Dr Lin being out of net-work. The correct tax ID is [id number]. Pt is calling to have this reprocessed with the correct information and the pt would like a call to be updated on the status. Please advise

## 2022-11-08 NOTE — TELEPHONE ENCOUNTER
Danik[employer] representative called on behalf of the pt stating that the pt is receiving a bill from Dr Giulia White due to the wrong tax ID number that being put in for the pt's visits. The tax ID that is being used is [id number] and this populates as Dr Giulia White being out of net-work. The correct tax ID is [id number]. Pt is calling to have this reprocessed with the correct information and the pt would like a call to be updated on the status.  Please advise

## 2022-11-11 LAB — DIABETIC RETINOPATHY: NEGATIVE

## 2022-11-15 NOTE — TELEPHONE ENCOUNTER
Sent email to billing department to look into and will call patient back as soon as I get word from them.

## 2022-12-06 NOTE — TELEPHONE ENCOUNTER
Received email from billing department stating that this was sent to the billing team to review and to allow at least 30 days to process.

## 2022-12-14 DIAGNOSIS — E11.8 CONTROLLED TYPE 2 DIABETES MELLITUS WITH COMPLICATION, WITHOUT LONG-TERM CURRENT USE OF INSULIN (HCC): ICD-10-CM

## 2022-12-14 DIAGNOSIS — I10 ESSENTIAL HYPERTENSION: ICD-10-CM

## 2022-12-14 RX ORDER — LOSARTAN POTASSIUM 100 MG/1
TABLET ORAL
Qty: 90 TABLET | Refills: 0 | Status: SHIPPED | OUTPATIENT
Start: 2022-12-14

## 2022-12-14 RX ORDER — METFORMIN HYDROCHLORIDE 500 MG/1
TABLET ORAL
Qty: 180 TABLET | Refills: 0 | Status: SHIPPED | OUTPATIENT
Start: 2022-12-14

## 2023-03-23 ENCOUNTER — HOSPITAL ENCOUNTER (OUTPATIENT)
Facility: HOSPITAL | Age: 58
Setting detail: SPECIMEN
Discharge: HOME | End: 2023-03-23
Payer: COMMERCIAL

## 2023-03-23 ENCOUNTER — TELEPHONE (OUTPATIENT)
Dept: FAMILY MEDICINE CLINIC | Facility: CLINIC | Age: 58
End: 2023-03-23

## 2023-03-23 ENCOUNTER — OFFICE VISIT (OUTPATIENT)
Dept: FAMILY MEDICINE CLINIC | Facility: CLINIC | Age: 58
End: 2023-03-23
Payer: COMMERCIAL

## 2023-03-23 VITALS
RESPIRATION RATE: 15 BRPM | SYSTOLIC BLOOD PRESSURE: 146 MMHG | TEMPERATURE: 97.3 F | HEIGHT: 64 IN | DIASTOLIC BLOOD PRESSURE: 96 MMHG | OXYGEN SATURATION: 98 % | BODY MASS INDEX: 36.67 KG/M2 | WEIGHT: 214.8 LBS | HEART RATE: 62 BPM

## 2023-03-23 DIAGNOSIS — I10 ESSENTIAL (PRIMARY) HYPERTENSION: ICD-10-CM

## 2023-03-23 DIAGNOSIS — E11.8 TYPE 2 DIABETES MELLITUS WITH UNSPECIFIED COMPLICATIONS (HCC): ICD-10-CM

## 2023-03-23 DIAGNOSIS — N18.31 STAGE 3A CHRONIC KIDNEY DISEASE (HCC): ICD-10-CM

## 2023-03-23 DIAGNOSIS — E11.8 TYPE 2 DIABETES MELLITUS WITH UNSPECIFIED COMPLICATIONS (HCC): Primary | ICD-10-CM

## 2023-03-23 DIAGNOSIS — M54.12 CERVICAL RADICULOPATHY: ICD-10-CM

## 2023-03-23 LAB
ALBUMIN SERPL-MCNC: 3.7 G/DL (ref 3.4–5)
ALBUMIN/GLOB SERPL: 1 (ref 0.8–1.7)
ALP SERPL-CCNC: 56 U/L (ref 45–117)
ALT SERPL-CCNC: 27 U/L (ref 13–56)
ANION GAP SERPL CALC-SCNC: 3 MMOL/L (ref 3–18)
AST SERPL-CCNC: 26 U/L (ref 10–38)
BASOPHILS # BLD: 0.1 K/UL (ref 0–0.1)
BASOPHILS NFR BLD: 1 % (ref 0–2)
BILIRUB SERPL-MCNC: 0.5 MG/DL (ref 0.2–1)
BUN SERPL-MCNC: 16 MG/DL (ref 7–18)
BUN/CREAT SERPL: 17 (ref 12–20)
CALCIUM SERPL-MCNC: 9.3 MG/DL (ref 8.5–10.1)
CHLORIDE SERPL-SCNC: 107 MMOL/L (ref 100–111)
CO2 SERPL-SCNC: 30 MMOL/L (ref 21–32)
CREAT SERPL-MCNC: 0.96 MG/DL (ref 0.6–1.3)
DIFFERENTIAL METHOD BLD: ABNORMAL
EOSINOPHIL # BLD: 0.1 K/UL (ref 0–0.4)
EOSINOPHIL NFR BLD: 2 % (ref 0–5)
ERYTHROCYTE [DISTWIDTH] IN BLOOD BY AUTOMATED COUNT: 13.2 % (ref 11.6–14.5)
EST. AVERAGE GLUCOSE BLD GHB EST-MCNC: 131 MG/DL
GLOBULIN SER CALC-MCNC: 3.7 G/DL (ref 2–4)
GLUCOSE SERPL-MCNC: 120 MG/DL (ref 74–99)
HBA1C MFR BLD: 6.2 % (ref 4.2–5.6)
HCT VFR BLD AUTO: 41.6 % (ref 35–45)
HGB BLD-MCNC: 12.7 G/DL (ref 12–16)
IMM GRANULOCYTES # BLD AUTO: 0 K/UL (ref 0–0.04)
IMM GRANULOCYTES NFR BLD AUTO: 0 % (ref 0–0.5)
LYMPHOCYTES # BLD: 2.1 K/UL (ref 0.9–3.6)
LYMPHOCYTES NFR BLD: 35 % (ref 21–52)
MCH RBC QN AUTO: 28 PG (ref 24–34)
MCHC RBC AUTO-ENTMCNC: 30.5 G/DL (ref 31–37)
MCV RBC AUTO: 91.8 FL (ref 78–100)
MONOCYTES # BLD: 0.6 K/UL (ref 0.05–1.2)
MONOCYTES NFR BLD: 9 % (ref 3–10)
NEUTS SEG # BLD: 3.2 K/UL (ref 1.8–8)
NEUTS SEG NFR BLD: 53 % (ref 40–73)
NRBC # BLD: 0 K/UL (ref 0–0.01)
NRBC BLD-RTO: 0 PER 100 WBC
PLATELET # BLD AUTO: 283 K/UL (ref 135–420)
PMV BLD AUTO: 11.1 FL (ref 9.2–11.8)
POTASSIUM SERPL-SCNC: 4.3 MMOL/L (ref 3.5–5.5)
PROT SERPL-MCNC: 7.4 G/DL (ref 6.4–8.2)
RBC # BLD AUTO: 4.53 M/UL (ref 4.2–5.3)
SODIUM SERPL-SCNC: 140 MMOL/L (ref 136–145)
T4 FREE SERPL-MCNC: 1 NG/DL (ref 0.7–1.5)
TSH SERPL DL<=0.05 MIU/L-ACNC: 1.05 UIU/ML (ref 0.36–3.74)
WBC # BLD AUTO: 6.1 K/UL (ref 4.6–13.2)

## 2023-03-23 PROCEDURE — 3080F DIAST BP >= 90 MM HG: CPT | Performed by: LEGAL MEDICINE

## 2023-03-23 PROCEDURE — 36415 COLL VENOUS BLD VENIPUNCTURE: CPT

## 2023-03-23 PROCEDURE — 83036 HEMOGLOBIN GLYCOSYLATED A1C: CPT

## 2023-03-23 PROCEDURE — 99214 OFFICE O/P EST MOD 30 MIN: CPT | Performed by: LEGAL MEDICINE

## 2023-03-23 PROCEDURE — 80053 COMPREHEN METABOLIC PANEL: CPT

## 2023-03-23 PROCEDURE — 3077F SYST BP >= 140 MM HG: CPT | Performed by: LEGAL MEDICINE

## 2023-03-23 PROCEDURE — 84439 ASSAY OF FREE THYROXINE: CPT

## 2023-03-23 PROCEDURE — 85025 COMPLETE CBC W/AUTO DIFF WBC: CPT

## 2023-03-23 RX ORDER — GABAPENTIN 300 MG/1
300 CAPSULE ORAL AS NEEDED
COMMUNITY
Start: 2022-12-22

## 2023-03-23 RX ORDER — TIRZEPATIDE 2.5 MG/.5ML
2.5 INJECTION, SOLUTION SUBCUTANEOUS WEEKLY
Qty: 2 ML | Refills: 0 | Status: SHIPPED | OUTPATIENT
Start: 2023-03-23 | End: 2023-04-22

## 2023-03-23 SDOH — ECONOMIC STABILITY: INCOME INSECURITY: HOW HARD IS IT FOR YOU TO PAY FOR THE VERY BASICS LIKE FOOD, HOUSING, MEDICAL CARE, AND HEATING?: NOT HARD AT ALL

## 2023-03-23 SDOH — ECONOMIC STABILITY: FOOD INSECURITY: WITHIN THE PAST 12 MONTHS, YOU WORRIED THAT YOUR FOOD WOULD RUN OUT BEFORE YOU GOT MONEY TO BUY MORE.: NEVER TRUE

## 2023-03-23 SDOH — ECONOMIC STABILITY: FOOD INSECURITY: WITHIN THE PAST 12 MONTHS, THE FOOD YOU BOUGHT JUST DIDN'T LAST AND YOU DIDN'T HAVE MONEY TO GET MORE.: NEVER TRUE

## 2023-03-23 SDOH — ECONOMIC STABILITY: HOUSING INSECURITY
IN THE LAST 12 MONTHS, WAS THERE A TIME WHEN YOU DID NOT HAVE A STEADY PLACE TO SLEEP OR SLEPT IN A SHELTER (INCLUDING NOW)?: NO

## 2023-03-23 ASSESSMENT — ENCOUNTER SYMPTOMS
COUGH: 0
ABDOMINAL DISTENTION: 0
EYE ITCHING: 0
SINUS PAIN: 0
SHORTNESS OF BREATH: 0
APNEA: 0
BLOOD IN STOOL: 0
ANAL BLEEDING: 0
STRIDOR: 0
ABDOMINAL PAIN: 0
EYE PAIN: 0
CHEST TIGHTNESS: 0
FACIAL SWELLING: 0
DIARRHEA: 0
CONSTIPATION: 0
SORE THROAT: 0
EYE DISCHARGE: 0
BACK PAIN: 0
SINUS PRESSURE: 0
PHOTOPHOBIA: 0
NAUSEA: 0
RHINORRHEA: 0
VOMITING: 0
COLOR CHANGE: 0
RECTAL PAIN: 0
VOICE CHANGE: 0
TROUBLE SWALLOWING: 0
EYE REDNESS: 0
CHOKING: 0
WHEEZING: 0

## 2023-03-23 ASSESSMENT — ANXIETY QUESTIONNAIRES
1. FEELING NERVOUS, ANXIOUS, OR ON EDGE: 0
7. FEELING AFRAID AS IF SOMETHING AWFUL MIGHT HAPPEN: 0
GAD7 TOTAL SCORE: 0
3. WORRYING TOO MUCH ABOUT DIFFERENT THINGS: 0
6. BECOMING EASILY ANNOYED OR IRRITABLE: 0
2. NOT BEING ABLE TO STOP OR CONTROL WORRYING: 0
IF YOU CHECKED OFF ANY PROBLEMS ON THIS QUESTIONNAIRE, HOW DIFFICULT HAVE THESE PROBLEMS MADE IT FOR YOU TO DO YOUR WORK, TAKE CARE OF THINGS AT HOME, OR GET ALONG WITH OTHER PEOPLE: NOT DIFFICULT AT ALL
4. TROUBLE RELAXING: 0
5. BEING SO RESTLESS THAT IT IS HARD TO SIT STILL: 0

## 2023-03-23 ASSESSMENT — PATIENT HEALTH QUESTIONNAIRE - PHQ9
SUM OF ALL RESPONSES TO PHQ QUESTIONS 1-9: 0
SUM OF ALL RESPONSES TO PHQ QUESTIONS 1-9: 0
SUM OF ALL RESPONSES TO PHQ9 QUESTIONS 1 & 2: 0
1. LITTLE INTEREST OR PLEASURE IN DOING THINGS: 0
SUM OF ALL RESPONSES TO PHQ QUESTIONS 1-9: 0
SUM OF ALL RESPONSES TO PHQ QUESTIONS 1-9: 0
2. FEELING DOWN, DEPRESSED OR HOPELESS: 0

## 2023-03-23 NOTE — PROGRESS NOTES
Zelda Steel is a 59 y.o. female (: 1965) presenting to address:    Chief Complaint   Patient presents with    Medication Refill       Vitals:    23 0736   BP: (!) 160/100   Pulse: 62   Resp: 15   Temp: 97.3 °F (36.3 °C)   SpO2: 98%       Coordination of Care Questionaire:   1. \"Have you been to the ER, urgent care clinic since your last visit? Hospitalized since your last visit? \" No    2. \"Have you seen or consulted any other health care providers outside of the [address] [place] [address] since your last visit? \"  Sports medicine      3. For patients aged 39-70: Has the patient had a colonoscopy / FIT/ Cologuard? Yes - no Care Gap present      If the patient is female:    4. For patients aged 41-77: Has the patient had a mammogram within the past 2 years? Yes - no Care Gap present      5. For patients aged 21-65: Has the patient had a pap smear? Yes - no Care Gap present    Advanced Directive:   1. Do you have an Advanced Directive? No    2. Would you like information on Advanced Directives?  No
4. Cervical radiculopathy  M54.12    She is taking tramadol as needed following up with   Nohemi Boyle Md who is interventional pain management  Current Outpatient Medications   Medication Sig Dispense Refill    gabapentin (NEURONTIN) 300 MG capsule Take 300 mg by mouth as needed. Tirzepatide (MOUNJARO) 2.5 MG/0.5ML SOPN SC injection Inject 0.5 mLs into the skin once a week 2 mL 0    Continuous Blood Gluc  (FREESTYLE ELISE 2 READER) ALYCIA Use to check blood sugar 4-6 times a day 1 each 0    Continuous Blood Gluc Sensor (FREESTYLE ELISE 2 SENSOR) MISC 1 each by Does not apply route every 14 days 7 each 3    atorvastatin (LIPITOR) 40 MG tablet Take 40 mg by mouth daily      estradiol (ESTRACE) 0.5 MG tablet Take 1 tablet by mouth once daily      losartan (COZAAR) 100 MG tablet Take 1 tablet by mouth once daily      metFORMIN (GLUCOPHAGE) 500 MG tablet TAKE 1 TABLET BY MOUTH TWICE DAILY WITH MEALS      traMADol (ULTRAM) 50 MG tablet Take 50 mg by mouth 4 times daily as needed. No current facility-administered medications for this visit. Patient Active Problem List    Diagnosis Date Noted    RIKKI (generalized anxiety disorder) 12/16/2019    Controlled type 2 diabetes mellitus with complication, without long-term current use of insulin ([facility] [address] 07/26/2019    Mixed hyperlipidemia 12/14/2018    Severe obesity ([facility] [address] 12/14/2018    Cervical radiculopathy due to degenerative joint disease of spine 09/06/2017    Cervical radiculopathy 09/06/2017    HTN (hypertension) 06/01/2016     No results found for this visit on 03/23/23. No visits with results within 3 Month(s) from this visit.    Latest known visit with results is:   Orders Only on 05/17/2022   Component Date Value Ref Range Status    Hemoglobin A1C 05/17/2022 6.4 (A)  4.2 - 5.6 % Final    Comment: (NOTE)  HbA1C Interpretive Ranges  <5.7              Normal  5.7 - 6.4         Consider Prediabetes  >6.5              Consider Diabetes      Sodium

## 2023-03-23 NOTE — TELEPHONE ENCOUNTER
Patient called stating that the pharmacy never received 14 day lindsey for 90 day supply.     She also wanted to let the provider know that Her monjuaro was accepted by insurance

## 2023-03-27 ENCOUNTER — PATIENT MESSAGE (OUTPATIENT)
Dept: FAMILY MEDICINE CLINIC | Facility: CLINIC | Age: 58
End: 2023-03-27

## 2023-03-27 NOTE — TELEPHONE ENCOUNTER
From: Celestine Arzate  To: Dr. Agnieszka Contreras: 3/27/2023 2:52 PM EDT  Subject: Medicine question/ 14 Day Antony system    Good afternoon,  Concerning my Glucose range, what is it? I am updating my glucose reader, but not sure if I have the correct range set for myself. Also, since I am taking Mounjaro for my diabetes, I want to confirm that I am no longer taking Metformin?     Thank you!!  Lilly

## 2023-03-29 NOTE — TELEPHONE ENCOUNTER
I do not recommend stopping the metformin right away you see how your blood sugars going and if your blood sugar fasting is getting below 70 then I would consider reducing your metformin to once a day , and then if your blood sugar continues to be lower than normal then you stop the second tablet of metformin    And yourMounjarao dose will be gradually increased so may be in the future metformin will be less or may stay the same it depends on your blood sugar and your blood monitor results

## 2023-04-10 ENCOUNTER — PATIENT MESSAGE (OUTPATIENT)
Dept: FAMILY MEDICINE CLINIC | Facility: CLINIC | Age: 58
End: 2023-04-10

## 2023-04-26 RX ORDER — LOSARTAN POTASSIUM 100 MG/1
TABLET ORAL
Qty: 30 TABLET | Refills: 0 | Status: SHIPPED | OUTPATIENT
Start: 2023-04-26 | End: 2023-05-23

## 2023-05-18 DIAGNOSIS — E11.8 TYPE 2 DIABETES MELLITUS WITH UNSPECIFIED COMPLICATIONS (HCC): ICD-10-CM

## 2023-05-19 DIAGNOSIS — E11.8 TYPE 2 DIABETES MELLITUS WITH UNSPECIFIED COMPLICATIONS (HCC): ICD-10-CM

## 2023-05-20 DIAGNOSIS — I10 ESSENTIAL (PRIMARY) HYPERTENSION: Primary | ICD-10-CM

## 2023-05-21 RX ORDER — TIRZEPATIDE 2.5 MG/.5ML
2.5 INJECTION, SOLUTION SUBCUTANEOUS WEEKLY
Qty: 2 ML | Refills: 0 | OUTPATIENT
Start: 2023-05-21 | End: 2023-06-20

## 2023-05-21 RX ORDER — TIRZEPATIDE 2.5 MG/.5ML
INJECTION, SOLUTION SUBCUTANEOUS
Qty: 4 ML | Refills: 0 | Status: SHIPPED | OUTPATIENT
Start: 2023-05-21

## 2023-05-22 ENCOUNTER — TELEPHONE (OUTPATIENT)
Dept: FAMILY MEDICINE CLINIC | Facility: CLINIC | Age: 58
End: 2023-05-22

## 2023-05-22 DIAGNOSIS — E11.8 TYPE 2 DIABETES MELLITUS WITH UNSPECIFIED COMPLICATIONS (HCC): Primary | ICD-10-CM

## 2023-05-22 RX ORDER — TIRZEPATIDE 5 MG/.5ML
5 INJECTION, SOLUTION SUBCUTANEOUS WEEKLY
Qty: 2 ML | Refills: 0 | Status: SHIPPED | OUTPATIENT
Start: 2023-05-22 | End: 2023-05-22 | Stop reason: SDUPTHER

## 2023-05-22 RX ORDER — TIRZEPATIDE 5 MG/.5ML
5 INJECTION, SOLUTION SUBCUTANEOUS WEEKLY
Qty: 2 ML | Refills: 1 | Status: SHIPPED | OUTPATIENT
Start: 2023-05-22 | End: 2023-06-21

## 2023-05-23 RX ORDER — LOSARTAN POTASSIUM 100 MG/1
TABLET ORAL
Qty: 90 TABLET | Refills: 3 | Status: SHIPPED | OUTPATIENT
Start: 2023-05-23

## 2023-07-10 NOTE — TELEPHONE ENCOUNTER
Last visit: 3/23/23  Next visit:   Future Appointments   Date Time Provider [address]  [address]   7/24/2023  7:15 AM Rony Barbosa MD BSMA BS AMB     Last filled: 12/14/22

## 2023-07-24 ENCOUNTER — OFFICE VISIT (OUTPATIENT)
Dept: FAMILY MEDICINE CLINIC | Facility: CLINIC | Age: 58
End: 2023-07-24
Payer: COMMERCIAL

## 2023-07-24 VITALS
HEIGHT: 64 IN | RESPIRATION RATE: 15 BRPM | OXYGEN SATURATION: 98 % | DIASTOLIC BLOOD PRESSURE: 82 MMHG | WEIGHT: 210.6 LBS | HEART RATE: 91 BPM | TEMPERATURE: 97.5 F | SYSTOLIC BLOOD PRESSURE: 132 MMHG | BODY MASS INDEX: 35.96 KG/M2

## 2023-07-24 DIAGNOSIS — I10 ESSENTIAL (PRIMARY) HYPERTENSION: ICD-10-CM

## 2023-07-24 DIAGNOSIS — E11.8 TYPE 2 DIABETES MELLITUS WITH UNSPECIFIED COMPLICATIONS (HCC): Primary | ICD-10-CM

## 2023-07-24 LAB — HBA1C MFR BLD: 5.9 %

## 2023-07-24 PROCEDURE — 3079F DIAST BP 80-89 MM HG: CPT | Performed by: LEGAL MEDICINE

## 2023-07-24 PROCEDURE — 3075F SYST BP GE 130 - 139MM HG: CPT | Performed by: LEGAL MEDICINE

## 2023-07-24 PROCEDURE — 3044F HG A1C LEVEL LT 7.0%: CPT | Performed by: LEGAL MEDICINE

## 2023-07-24 PROCEDURE — 83036 HEMOGLOBIN GLYCOSYLATED A1C: CPT | Performed by: LEGAL MEDICINE

## 2023-07-24 PROCEDURE — 99214 OFFICE O/P EST MOD 30 MIN: CPT | Performed by: LEGAL MEDICINE

## 2023-07-24 RX ORDER — TIRZEPATIDE 10 MG/.5ML
10 INJECTION, SOLUTION SUBCUTANEOUS WEEKLY
Qty: 12 ADJUSTABLE DOSE PRE-FILLED PEN SYRINGE | Refills: 1 | Status: SHIPPED | OUTPATIENT
Start: 2023-08-22 | End: 2023-11-20

## 2023-07-24 RX ORDER — TIRZEPATIDE 7.5 MG/.5ML
7.5 INJECTION, SOLUTION SUBCUTANEOUS WEEKLY
Qty: 4 ADJUSTABLE DOSE PRE-FILLED PEN SYRINGE | Refills: 0 | Status: SHIPPED | OUTPATIENT
Start: 2023-07-24 | End: 2023-07-28

## 2023-07-24 ASSESSMENT — ENCOUNTER SYMPTOMS
CHEST TIGHTNESS: 0
APNEA: 0
COUGH: 0
ABDOMINAL PAIN: 0
VOMITING: 0
CONSTIPATION: 0
EYE PAIN: 0
DIARRHEA: 0
SORE THROAT: 0
TROUBLE SWALLOWING: 0
RECTAL PAIN: 0
SHORTNESS OF BREATH: 0
BACK PAIN: 1
CHOKING: 0
EYE REDNESS: 0
ANAL BLEEDING: 0
VOICE CHANGE: 0
WHEEZING: 0
NAUSEA: 0
BLOOD IN STOOL: 0
EYE DISCHARGE: 0
FACIAL SWELLING: 0
EYE ITCHING: 0

## 2023-08-17 RX ORDER — ATORVASTATIN CALCIUM 40 MG/1
40 TABLET, FILM COATED ORAL DAILY
Qty: 90 TABLET | Refills: 0 | Status: SHIPPED | OUTPATIENT
Start: 2023-08-17 | End: 2023-11-15

## 2023-08-17 NOTE — TELEPHONE ENCOUNTER
Spoke w/pt and pt has refills on file at pharmacy for [facility] and Losartan; sending refill request for Lipitor 40, last filled: 10/25/22.

## 2023-08-17 NOTE — TELEPHONE ENCOUNTER
----- Message from Karishma Shah sent at 8/17/2023 10:24 AM EDT -----  Subject: Refill Request    QUESTIONS  Name of Medication? Tirzepatide (MOUNJARO) 7.5 MG/0.5ML SOPN SC injection  Patient-reported dosage and instructions? one per week   How many days do you have left? 2  Preferred Pharmacy? [address]   Pharmacy phone number (if available)? [phone]  ---------------------------------------------------------------------------  --------------,  Name of Medication? atorvastatin (LIPITOR) 40 MG tablet  Patient-reported dosage and instructions? one per day   How many days do you have left? 10  Preferred Pharmacy? [address] [facility]   Pharmacy phone number (if available)? [phone]  ---------------------------------------------------------------------------  --------------,  Name of Medication? losartan (COZAAR) 100 MG tablet  Patient-reported dosage and instructions? one per day   How many days do you have left? 5  [address] Pharmacy? [address] [facility]   Pharmacy phone number (if available)? [phone]  Additional Information for Provider? pt has est care on 10/17/2023  She   needs one more refill from past office to get her to her next appt   ---------------------------------------------------------------------------  --------------  [address] [place]  What is the best way for the office to contact you? OK to leave message on   voicemail  Preferred Call Back Phone Number? [phone]  ---------------------------------------------------------------------------  --------------  SCRIPT ANSWERS  Relationship to Patient?  Self

## 2023-08-18 ENCOUNTER — TRANSCRIBE ORDERS (OUTPATIENT)
Facility: HOSPITAL | Age: 58
End: 2023-08-18

## 2023-08-18 DIAGNOSIS — Z12.31 SCREENING MAMMOGRAM, ENCOUNTER FOR: Primary | ICD-10-CM

## 2023-10-17 ENCOUNTER — OFFICE VISIT (OUTPATIENT)
Age: 58
End: 2023-10-17
Payer: COMMERCIAL

## 2023-10-17 VITALS
SYSTOLIC BLOOD PRESSURE: 146 MMHG | DIASTOLIC BLOOD PRESSURE: 82 MMHG | RESPIRATION RATE: 20 BRPM | BODY MASS INDEX: 34.59 KG/M2 | OXYGEN SATURATION: 97 % | HEART RATE: 86 BPM | TEMPERATURE: 96.8 F | HEIGHT: 64 IN | WEIGHT: 202.6 LBS

## 2023-10-17 DIAGNOSIS — Z13.21 ENCOUNTER FOR VITAMIN DEFICIENCY SCREENING: ICD-10-CM

## 2023-10-17 DIAGNOSIS — I10 ESSENTIAL (PRIMARY) HYPERTENSION: Primary | ICD-10-CM

## 2023-10-17 DIAGNOSIS — R01.1 CARDIAC MURMUR: ICD-10-CM

## 2023-10-17 DIAGNOSIS — E66.9 OBESITY (BMI 30-39.9): ICD-10-CM

## 2023-10-17 DIAGNOSIS — N95.1 VASOMOTOR SYMPTOMS DUE TO MENOPAUSE: ICD-10-CM

## 2023-10-17 DIAGNOSIS — E11.8 TYPE 2 DIABETES MELLITUS WITH UNSPECIFIED COMPLICATIONS (HCC): ICD-10-CM

## 2023-10-17 DIAGNOSIS — E78.2 MIXED HYPERLIPIDEMIA: ICD-10-CM

## 2023-10-17 DIAGNOSIS — Z13.29 SCREENING FOR THYROID DISORDER: ICD-10-CM

## 2023-10-17 PROCEDURE — 3044F HG A1C LEVEL LT 7.0%: CPT | Performed by: NURSE PRACTITIONER

## 2023-10-17 PROCEDURE — 3078F DIAST BP <80 MM HG: CPT | Performed by: NURSE PRACTITIONER

## 2023-10-17 PROCEDURE — 99215 OFFICE O/P EST HI 40 MIN: CPT | Performed by: NURSE PRACTITIONER

## 2023-10-17 PROCEDURE — 93000 ELECTROCARDIOGRAM COMPLETE: CPT | Performed by: NURSE PRACTITIONER

## 2023-10-17 PROCEDURE — 3074F SYST BP LT 130 MM HG: CPT | Performed by: NURSE PRACTITIONER

## 2023-10-17 RX ORDER — LOSARTAN POTASSIUM AND HYDROCHLOROTHIAZIDE 25; 100 MG/1; MG/1
1 TABLET ORAL DAILY
Qty: 90 TABLET | Refills: 1 | Status: SHIPPED | OUTPATIENT
Start: 2023-10-17

## 2023-10-17 RX ORDER — ESTRADIOL 0.5 MG/1
0.5 TABLET ORAL DAILY
Qty: 90 TABLET | Refills: 1 | Status: SHIPPED | OUTPATIENT
Start: 2023-10-17

## 2023-10-17 RX ORDER — ATORVASTATIN CALCIUM 40 MG/1
40 TABLET, FILM COATED ORAL DAILY
Qty: 90 TABLET | Refills: 1 | Status: SHIPPED | OUTPATIENT
Start: 2023-10-17 | End: 2024-01-15

## 2023-10-17 NOTE — PROGRESS NOTES
Office Note    Assessment & Plan:   diabetes needs further observation, needs improvement, and needs to follow diet more regularly, hypertension needs further observation, needs improvement, and needs to follow diet more regularly, hyperlipidemia needs further observation, needs improvement, and needs to follow diet more regularly. Diabetic issues reviewed with her: diabetic diet discussed in detail, written exchange diet given, low cholesterol diet, weight control and daily exercise discussed, home glucose monitoring emphasized, all medications, side effects and compliance discussed carefully, diabetic Sick Day rules reviewed, handout given, foot care discussed and Podiatry visits discussed, annual eye examinations at Ophthalmology discussed, glycohemoglobin and other lab monitoring discussed, long term diabetic complications discussed, and labs immediately prior to next visit. Hypertension issues reviewed with her: Continue current therapy       Lilly was seen today for diabetes and hypertension. Diagnoses and all orders for this visit:    Essential (primary) hypertension  -     Comprehensive Metabolic Panel; Future  -     losartan-hydroCHLOROthiazide (HYZAAR) 100-25 MG per tablet; Take 1 tablet by mouth daily    Type 2 diabetes mellitus with unspecified complications (HCC)  -     Hemoglobin A1C; Future  -     Continuous Blood Gluc Sensor (FREESTYLE ELISE 2 SENSOR) MISC; Check blood sugars 2-3 times per week. 1 time fasting (before meals), and 1 time post prandial (1-2 hours after meals). Check blood sugars also when feeling ill. Mixed hyperlipidemia  -     Lipid Panel; Future  -     atorvastatin (LIPITOR) 40 MG tablet; Take 1 tablet by mouth daily    Cardiac murmur  -     [facility], [facility], [facility])  -     EKG 12 Lead    Obesity (BMI 30-39.9)  -     Vitamin D 25 Hydroxy; Future    Vasomotor symptoms due to menopause  -     estradiol (ESTRACE) 0.5 MG tablet;  Take

## 2023-10-17 NOTE — PROGRESS NOTES
1. \"Have you been to the ER, urgent care clinic since your last visit? Hospitalized since your last visit? \" No    2. \"Have you seen or consulted any other health care providers outside of the [address] [place] [address] since your last visit? \" No     3. For patients aged 43-73: Has the patient had a colonoscopy / FIT/ Cologuard? Yes - no Care Gap present      If the patient is female:    4. For patients aged 43-66: Has the patient had a mammogram within the past 2 years? No- appointment scheduled      5. For patients aged 21-65: Has the patient had a pap smear?  Yes - no Care Gap present

## 2023-10-18 ENCOUNTER — HOSPITAL ENCOUNTER (OUTPATIENT)
Facility: HOSPITAL | Age: 58
Discharge: HOME | End: 2023-10-21
Payer: COMMERCIAL

## 2023-10-18 DIAGNOSIS — Z12.31 SCREENING MAMMOGRAM, ENCOUNTER FOR: ICD-10-CM

## 2023-10-18 PROCEDURE — 77067 SCR MAMMO BI INCL CAD: CPT

## 2023-10-19 PROBLEM — N95.1 VASOMOTOR SYMPTOMS DUE TO MENOPAUSE: Status: ACTIVE | Noted: 2023-10-19

## 2023-10-19 PROBLEM — E11.8 TYPE 2 DIABETES MELLITUS WITH UNSPECIFIED COMPLICATIONS (HCC): Status: ACTIVE | Noted: 2019-07-26

## 2023-10-19 PROBLEM — E66.9 OBESITY (BMI 30-39.9): Status: ACTIVE | Noted: 2023-10-19

## 2023-10-19 PROBLEM — R01.1 CARDIAC MURMUR: Status: ACTIVE | Noted: 2023-10-19

## 2023-10-31 ENCOUNTER — TELEPHONE (OUTPATIENT)
Age: 58
End: 2023-10-31

## 2023-10-31 NOTE — TELEPHONE ENCOUNTER
Patient went into her my chart and noticed the nurse did respond back to her request. Does not need a call back anymore

## 2023-11-09 ENCOUNTER — OFFICE VISIT (OUTPATIENT)
Age: 58
End: 2023-11-09
Payer: COMMERCIAL

## 2023-11-09 VITALS
BODY MASS INDEX: 34.49 KG/M2 | DIASTOLIC BLOOD PRESSURE: 80 MMHG | OXYGEN SATURATION: 97 % | HEART RATE: 88 BPM | SYSTOLIC BLOOD PRESSURE: 128 MMHG | WEIGHT: 202 LBS | HEIGHT: 64 IN

## 2023-11-09 DIAGNOSIS — R01.1 CARDIAC MURMUR: Primary | ICD-10-CM

## 2023-11-09 DIAGNOSIS — E78.2 MIXED HYPERLIPIDEMIA: ICD-10-CM

## 2023-11-09 DIAGNOSIS — I10 ESSENTIAL (PRIMARY) HYPERTENSION: ICD-10-CM

## 2023-11-09 DIAGNOSIS — I49.3 PVC (PREMATURE VENTRICULAR CONTRACTION): ICD-10-CM

## 2023-11-09 PROBLEM — M54.50 LOW BACK PAIN: Status: ACTIVE | Noted: 2017-01-05

## 2023-11-09 PROBLEM — M54.16 LUMBAR RADICULOPATHY: Status: ACTIVE | Noted: 2022-09-26

## 2023-11-09 PROBLEM — G89.4 CHRONIC PAIN DISORDER: Status: ACTIVE | Noted: 2022-05-02

## 2023-11-09 PROBLEM — M50.30 DEGENERATION OF CERVICAL INTERVERTEBRAL DISC: Status: ACTIVE | Noted: 2022-05-02

## 2023-11-09 PROBLEM — M47.812 ARTHROPATHY OF CERVICAL FACET JOINT: Status: ACTIVE | Noted: 2022-05-02

## 2023-11-09 PROBLEM — S33.5XXA: Status: ACTIVE | Noted: 2017-01-05

## 2023-11-09 PROBLEM — M54.17 LUMBOSACRAL RADICULOPATHY: Status: ACTIVE | Noted: 2022-09-26

## 2023-11-09 PROBLEM — M51.36 DEGENERATION OF LUMBAR INTERVERTEBRAL DISC: Status: ACTIVE | Noted: 2022-09-26

## 2023-11-09 PROBLEM — M51.369 DEGENERATION OF LUMBAR INTERVERTEBRAL DISC: Status: ACTIVE | Noted: 2022-09-26

## 2023-11-09 PROCEDURE — 93000 ELECTROCARDIOGRAM COMPLETE: CPT | Performed by: INTERNAL MEDICINE

## 2023-11-09 PROCEDURE — 99204 OFFICE O/P NEW MOD 45 MIN: CPT | Performed by: INTERNAL MEDICINE

## 2023-11-09 PROCEDURE — 3074F SYST BP LT 130 MM HG: CPT | Performed by: INTERNAL MEDICINE

## 2023-11-09 PROCEDURE — 3079F DIAST BP 80-89 MM HG: CPT | Performed by: INTERNAL MEDICINE

## 2023-11-09 ASSESSMENT — ANXIETY QUESTIONNAIRES
4. TROUBLE RELAXING: 0
2. NOT BEING ABLE TO STOP OR CONTROL WORRYING: 0
GAD7 TOTAL SCORE: 0
7. FEELING AFRAID AS IF SOMETHING AWFUL MIGHT HAPPEN: 0
6. BECOMING EASILY ANNOYED OR IRRITABLE: 0
1. FEELING NERVOUS, ANXIOUS, OR ON EDGE: 0
5. BEING SO RESTLESS THAT IT IS HARD TO SIT STILL: 0
3. WORRYING TOO MUCH ABOUT DIFFERENT THINGS: 0

## 2023-11-09 ASSESSMENT — PATIENT HEALTH QUESTIONNAIRE - PHQ9
SUM OF ALL RESPONSES TO PHQ QUESTIONS 1-9: 0
SUM OF ALL RESPONSES TO PHQ9 QUESTIONS 1 & 2: 0
SUM OF ALL RESPONSES TO PHQ QUESTIONS 1-9: 0
SUM OF ALL RESPONSES TO PHQ QUESTIONS 1-9: 0
2. FEELING DOWN, DEPRESSED OR HOPELESS: 0
SUM OF ALL RESPONSES TO PHQ QUESTIONS 1-9: 0
1. LITTLE INTEREST OR PLEASURE IN DOING THINGS: 0

## 2023-11-09 ASSESSMENT — ENCOUNTER SYMPTOMS
VOMITING: 0
SORE THROAT: 0
ABDOMINAL DISTENTION: 0
NAUSEA: 0
SHORTNESS OF BREATH: 0
ABDOMINAL PAIN: 0
COUGH: 0

## 2023-11-09 NOTE — PROGRESS NOTES
Lilly Donald presents today for   Chief Complaint   Patient presents with    New Patient     Pcp referred for cardiac murmur       Lilly Donald preferred language for health care discussion is english/other. Is someone accompanying this pt? no    Is the patient using any DME equipment during OV? no    Depression Screening:  Depression: Not at risk (11/9/2023)    PHQ-2     PHQ-2 Score: 0        Learning Assessment:  Who is the primary learner? Patient    What is the preferred language for health care of the primary learner? ENGLISH    How does the primary learner prefer to learn new concepts? DEMONSTRATION    Answered By patient    Relationship to Learner SELF           Pt currently taking Anticoagulant therapy? no    Pt currently taking Antiplatelet therapy ? no      Coordination of Care:  1. Have you been to the ER, urgent care clinic since your last visit? Hospitalized since your last visit? no    2. Have you seen or consulted any other health care providers outside of the [address] [place] [address] since your last visit? Include any pap smears or colon screening.  no

## 2023-11-09 NOTE — TELEPHONE ENCOUNTER
Pt is scheduled for tomorrow at 2:45pm to come in and she is aware Labs/EKG/Imaging Studies/Medications

## 2023-11-09 NOTE — PROGRESS NOTES
11/09/23     Lilly Taylor  is a 59 y.o. female     Chief Complaint   Patient presents with    New Patient     Pcp referred for cardiac murmur       HPI    Patient presents for a new office visit. She was referred by her PCP for evaluation of a heart murmur. She does not have a prior cardiac history. She does have a long history of hypertension, dyslipidemia, and type 2 diabetes. She states she has lost 30 pounds of weight over the past 3 months using a combination of dietary changes, increased physical activity and Mounjaro. She states she has been feeling better. She denies any chest pain, shortness of breath, leg swelling, heart palpitations, dizzy spells or syncope. She tries to exercise at least 3 times a week for 30 minutes on either an exercise bike or elliptical machine. Past Medical History:   Diagnosis Date    Gynecological disorder     fibroids and low test.     High cholesterol     Hypertension     Neck pain      Current Outpatient Medications   Medication Sig Dispense Refill    estradiol (ESTRACE) 0.5 MG tablet Take 1 tablet by mouth daily 90 tablet 1    atorvastatin (LIPITOR) 40 MG tablet Take 1 tablet by mouth daily 90 tablet 1    losartan-hydroCHLOROthiazide (HYZAAR) 100-25 MG per tablet Take 1 tablet by mouth daily 90 tablet 1    Continuous Blood Gluc Sensor (FREESTYLE ELISE 2 SENSOR) MISC Check blood sugars 2-3 times per week. 1 time fasting (before meals), and 1 time post prandial (1-2 hours after meals). Check blood sugars also when feeling ill. 2 each 5    Tirzepatide (MOUNJARO) 10 MG/0.5ML SOPN SC injection Inject 0.5 mLs into the skin once a week 12 Adjustable Dose Pre-filled Pen Syringe 1    gabapentin (NEURONTIN) 300 MG capsule Take 1 capsule by mouth as needed. Continuous Blood Gluc  (FREESTYLE ELISE 2 READER) ALYCIA Use to check blood sugar 4-6 times a day 1 each 0    traMADol (ULTRAM) 50 MG tablet Take 1 tablet by mouth 4 times daily as needed.        No

## 2024-01-17 ENCOUNTER — HOSPITAL ENCOUNTER (OUTPATIENT)
Facility: HOSPITAL | Age: 59
Setting detail: SPECIMEN
Discharge: HOME | End: 2024-01-20

## 2024-01-17 ENCOUNTER — OFFICE VISIT (OUTPATIENT)
Age: 59
End: 2024-01-17
Payer: COMMERCIAL

## 2024-01-17 VITALS
OXYGEN SATURATION: 96 % | SYSTOLIC BLOOD PRESSURE: 131 MMHG | HEIGHT: 64 IN | DIASTOLIC BLOOD PRESSURE: 80 MMHG | BODY MASS INDEX: 34.18 KG/M2 | HEART RATE: 84 BPM | WEIGHT: 200.2 LBS | RESPIRATION RATE: 18 BRPM | TEMPERATURE: 97 F

## 2024-01-17 DIAGNOSIS — E11.8 TYPE 2 DIABETES MELLITUS WITH UNSPECIFIED COMPLICATIONS (HCC): ICD-10-CM

## 2024-01-17 DIAGNOSIS — M47.22 OTHER SPONDYLOSIS WITH RADICULOPATHY, CERVICAL REGION: ICD-10-CM

## 2024-01-17 DIAGNOSIS — R01.1 CARDIAC MURMUR: ICD-10-CM

## 2024-01-17 DIAGNOSIS — I10 ESSENTIAL (PRIMARY) HYPERTENSION: Primary | ICD-10-CM

## 2024-01-17 DIAGNOSIS — E78.2 MIXED HYPERLIPIDEMIA: ICD-10-CM

## 2024-01-17 DIAGNOSIS — Z13.29 SCREENING FOR THYROID DISORDER: ICD-10-CM

## 2024-01-17 LAB
HBA1C MFR BLD: 5.7 %
LABCORP SPECIMEN COLLECTION: NORMAL

## 2024-01-17 PROCEDURE — 3044F HG A1C LEVEL LT 7.0%: CPT | Performed by: NURSE PRACTITIONER

## 2024-01-17 PROCEDURE — 83036 HEMOGLOBIN GLYCOSYLATED A1C: CPT | Performed by: NURSE PRACTITIONER

## 2024-01-17 PROCEDURE — 3079F DIAST BP 80-89 MM HG: CPT | Performed by: NURSE PRACTITIONER

## 2024-01-17 PROCEDURE — 3075F SYST BP GE 130 - 139MM HG: CPT | Performed by: NURSE PRACTITIONER

## 2024-01-17 PROCEDURE — 99214 OFFICE O/P EST MOD 30 MIN: CPT | Performed by: NURSE PRACTITIONER

## 2024-01-17 RX ORDER — GABAPENTIN 300 MG/1
300 CAPSULE ORAL DAILY PRN
Qty: 90 CAPSULE | Refills: 0 | Status: SHIPPED | OUTPATIENT
Start: 2024-01-17 | End: 2024-04-16

## 2024-01-17 RX ORDER — HYDROCHLOROTHIAZIDE 25 MG/1
25 TABLET ORAL EVERY MORNING
Qty: 90 TABLET | Refills: 1 | Status: SHIPPED | OUTPATIENT
Start: 2024-01-17

## 2024-01-17 RX ORDER — TRAMADOL HYDROCHLORIDE 50 MG/1
50 TABLET ORAL EVERY 6 HOURS PRN
Qty: 120 TABLET | Refills: 0 | Status: SHIPPED | OUTPATIENT
Start: 2024-01-17 | End: 2024-02-16

## 2024-01-17 RX ORDER — LOSARTAN POTASSIUM 100 MG/1
100 TABLET ORAL DAILY
Qty: 90 TABLET | Refills: 1 | Status: SHIPPED | OUTPATIENT
Start: 2024-01-17

## 2024-01-17 RX ORDER — TIRZEPATIDE 10 MG/.5ML
10 INJECTION, SOLUTION SUBCUTANEOUS WEEKLY
Qty: 12 ADJUSTABLE DOSE PRE-FILLED PEN SYRINGE | Refills: 3 | Status: SHIPPED | OUTPATIENT
Start: 2024-01-17 | End: 2024-04-16

## 2024-01-17 RX ORDER — ATORVASTATIN CALCIUM 40 MG/1
40 TABLET, FILM COATED ORAL DAILY
Qty: 90 TABLET | Refills: 1 | Status: SHIPPED | OUTPATIENT
Start: 2024-01-17

## 2024-01-17 ASSESSMENT — PATIENT HEALTH QUESTIONNAIRE - PHQ9
4. FEELING TIRED OR HAVING LITTLE ENERGY: 0
SUM OF ALL RESPONSES TO PHQ QUESTIONS 1-9: 2
1. LITTLE INTEREST OR PLEASURE IN DOING THINGS: 0
SUM OF ALL RESPONSES TO PHQ9 QUESTIONS 1 & 2: 1
9. THOUGHTS THAT YOU WOULD BE BETTER OFF DEAD, OR OF HURTING YOURSELF: 0
10. IF YOU CHECKED OFF ANY PROBLEMS, HOW DIFFICULT HAVE THESE PROBLEMS MADE IT FOR YOU TO DO YOUR WORK, TAKE CARE OF THINGS AT HOME, OR GET ALONG WITH OTHER PEOPLE: 0
SUM OF ALL RESPONSES TO PHQ QUESTIONS 1-9: 2
8. MOVING OR SPEAKING SO SLOWLY THAT OTHER PEOPLE COULD HAVE NOTICED. OR THE OPPOSITE, BEING SO FIGETY OR RESTLESS THAT YOU HAVE BEEN MOVING AROUND A LOT MORE THAN USUAL: 0
7. TROUBLE CONCENTRATING ON THINGS, SUCH AS READING THE NEWSPAPER OR WATCHING TELEVISION: 0
2. FEELING DOWN, DEPRESSED OR HOPELESS: 1
3. TROUBLE FALLING OR STAYING ASLEEP: 0
6. FEELING BAD ABOUT YOURSELF - OR THAT YOU ARE A FAILURE OR HAVE LET YOURSELF OR YOUR FAMILY DOWN: 0
SUM OF ALL RESPONSES TO PHQ QUESTIONS 1-9: 2
5. POOR APPETITE OR OVEREATING: 1
SUM OF ALL RESPONSES TO PHQ QUESTIONS 1-9: 2

## 2024-01-17 NOTE — PROGRESS NOTES
Office Note    Assessment & Plan:   diabetes needs further observation, needs improvement, and needs to follow diet more regularly, hypertension needs further observation, needs improvement, and needs to follow diet more regularly, hyperlipidemia needs further observation, needs improvement, and needs to follow diet more regularly.    Diabetic issues reviewed with her: diabetic diet discussed in detail, written exchange diet given, low cholesterol diet, weight control and daily exercise discussed, home glucose monitoring emphasized, all medications, side effects and compliance discussed carefully, diabetic Sick Day rules reviewed, handout given, foot care discussed and Podiatry visits discussed, annual eye examinations at Ophthalmology discussed, glycohemoglobin and other lab monitoring discussed, long term diabetic complications discussed, and labs immediately prior to next visit.   Hypertension issues reviewed with her: Continue current therapy     Lilly was seen today for hypertension, cholesterol problem and diabetes.    Diagnoses and all orders for this visit:    Essential (primary) hypertension  -     Cancel: Comprehensive Metabolic Panel; Future  -     losartan (COZAAR) 100 MG tablet; Take 1 tablet by mouth daily  -     hydroCHLOROthiazide (HYDRODIURIL) 25 MG tablet; Take 1 tablet by mouth every morning  -     Microalbumin / Creatinine Urine Ratio; Future    Type 2 diabetes mellitus with unspecified complications (HCC)  -     Cancel: Hemoglobin A1C; Future  -     Tirzepatide (MOUNJARO) 10 MG/0.5ML SOPN SC injection; Inject 0.5 mLs into the skin once a week  -     AMB POC HEMOGLOBIN A1C  -     Microalbumin / Creatinine Urine Ratio; Future    Mixed hyperlipidemia  -     Cancel: Lipid Panel; Future  -     atorvastatin (LIPITOR) 40 MG tablet; Take 1 tablet by mouth daily    Cardiac murmur    Screening for thyroid disorder  -     Cancel: TSH; Future    Other spondylosis with radiculopathy, cervical region  -

## 2024-01-17 NOTE — PATIENT INSTRUCTIONS
professional. [employer] disclaims any warranty or liability for your use of this information.       Patient Education        Home Blood Pressure Test: About This Test  What is it?     A home blood pressure test allows you to keep track of your blood pressure at home. Blood pressure is a measure of the force of blood against the walls of your arteries. Blood pressure readings include two numbers, such as 130/80 (say \"130 over 80\"). The first number is the systolic pressure. The second number is the diastolic pressure.  Why is this test done?  You may do this test at home to:  Find out if you have high blood pressure.  Track your blood pressure if you have high blood pressure.  Track how well medicine is working to reduce high blood pressure.  Check how lifestyle changes, such as weight loss and exercise, are affecting blood pressure.  How do you prepare for the test?  For at least 30 minutes before you take your blood pressure, don't exercise, drink caffeine, or smoke. Empty your bladder before the test. Sit quietly with your back straight and both feet on the floor for at least 5 minutes. This helps you take your blood pressure while you feel comfortable and relaxed.  How is the test done?  If your doctor recommends it, take your blood pressure twice a day. Take it in the morning and evening.  Sit with your arm slightly bent and resting on a table so that your upper arm is at the same level as your heart.  Use the same arm each time you take your blood pressure.  Place the blood pressure cuff on the bare skin of your upper arm. You may have to roll up your sleeve, remove your arm from the sleeve, or take your shirt off.  Wrap the blood pressure cuff around your upper arm so that the lower edge of the cuff is about 1 inch above the bend of your elbow.  Do not move, talk, or text while you take your blood pressure.  Follow the instructions that came with your blood pressure monitor. They might be

## 2024-01-17 NOTE — PROGRESS NOTES
1. \"Have you been to the ER, urgent care clinic since your last visit?  Hospitalized since your last visit?\" No    2. \"Have you seen or consulted any other health care providers outside of the [facility] since your last visit?\" No     3. For patients aged 45-75: Has the patient had a colonoscopy / FIT/ Cologuard? Yes - no Care Gap present      If the patient is female:    4. For patients aged 40-74: Has the patient had a mammogram within the past 2 years? Yes - no Care Gap present      5. For patients aged 21-65: Has the patient had a pap smear? Yes - no Care Gap present

## 2024-01-18 LAB
25(OH)D3+25(OH)D2 SERPL-MCNC: 69.5 NG/ML (ref 30–100)
ALBUMIN SERPL-MCNC: 4.4 G/DL (ref 3.8–4.9)
ALBUMIN/CREAT UR: 9 MG/G CREAT (ref 0–29)
ALBUMIN/GLOB SERPL: 1.4 {RATIO} (ref 1.2–2.2)
ALP SERPL-CCNC: 59 IU/L (ref 44–121)
ALT SERPL-CCNC: 19 IU/L (ref 0–32)
AST SERPL-CCNC: 20 IU/L (ref 0–40)
BILIRUB SERPL-MCNC: 0.4 MG/DL (ref 0–1.2)
BUN SERPL-MCNC: 13 MG/DL (ref 8–27)
BUN/CREAT SERPL: 13 (ref 12–28)
CALCIUM SERPL-MCNC: 9.8 MG/DL (ref 8.7–10.3)
CHLORIDE SERPL-SCNC: 102 MMOL/L (ref 96–106)
CHOLEST SERPL-MCNC: 197 MG/DL (ref 100–199)
CO2 SERPL-SCNC: 21 MMOL/L (ref 20–29)
CREAT SERPL-MCNC: 0.99 MG/DL (ref 0.57–1)
CREAT UR-MCNC: 251.6 MG/DL
EGFRCR SERPLBLD CKD-EPI 2021: 65 ML/MIN/1.73
GLOBULIN SER CALC-MCNC: 3.1 G/DL (ref 1.5–4.5)
GLUCOSE SERPL-MCNC: 101 MG/DL (ref 70–99)
HBA1C MFR BLD: 5.8 % (ref 4.8–5.6)
HDLC SERPL-MCNC: 64 MG/DL
LDLC SERPL CALC-MCNC: 116 MG/DL (ref 0–99)
MICROALBUMIN UR-MCNC: 21.8 UG/ML
POTASSIUM SERPL-SCNC: 4.3 MMOL/L (ref 3.5–5.2)
PROT SERPL-MCNC: 7.5 G/DL (ref 6–8.5)
SODIUM SERPL-SCNC: 142 MMOL/L (ref 134–144)
TRIGL SERPL-MCNC: 93 MG/DL (ref 0–149)
TSH SERPL DL<=0.005 MIU/L-ACNC: 1.18 UIU/ML (ref 0.45–4.5)
VLDLC SERPL CALC-MCNC: 17 MG/DL (ref 5–40)

## 2024-03-08 ENCOUNTER — OFFICE VISIT (OUTPATIENT)
Age: 59
End: 2024-03-08
Payer: COMMERCIAL

## 2024-03-08 VITALS
HEIGHT: 64 IN | BODY MASS INDEX: 35.03 KG/M2 | OXYGEN SATURATION: 94 % | DIASTOLIC BLOOD PRESSURE: 78 MMHG | RESPIRATION RATE: 18 BRPM | HEART RATE: 89 BPM | SYSTOLIC BLOOD PRESSURE: 122 MMHG | WEIGHT: 205.2 LBS | TEMPERATURE: 97.2 F

## 2024-03-08 DIAGNOSIS — M54.50 CHRONIC BILATERAL LOW BACK PAIN, UNSPECIFIED WHETHER SCIATICA PRESENT: ICD-10-CM

## 2024-03-08 DIAGNOSIS — M54.16 LUMBAR RADICULOPATHY: ICD-10-CM

## 2024-03-08 DIAGNOSIS — M25.552 BILATERAL HIP PAIN: Primary | ICD-10-CM

## 2024-03-08 DIAGNOSIS — M54.17 LUMBOSACRAL RADICULOPATHY: ICD-10-CM

## 2024-03-08 DIAGNOSIS — M51.36 DEGENERATION OF LUMBAR INTERVERTEBRAL DISC: ICD-10-CM

## 2024-03-08 DIAGNOSIS — G89.29 CHRONIC BILATERAL LOW BACK PAIN, UNSPECIFIED WHETHER SCIATICA PRESENT: ICD-10-CM

## 2024-03-08 DIAGNOSIS — E11.8 TYPE 2 DIABETES MELLITUS WITH UNSPECIFIED COMPLICATIONS (HCC): ICD-10-CM

## 2024-03-08 DIAGNOSIS — M25.551 BILATERAL HIP PAIN: Primary | ICD-10-CM

## 2024-03-08 PROCEDURE — 3078F DIAST BP <80 MM HG: CPT | Performed by: NURSE PRACTITIONER

## 2024-03-08 PROCEDURE — 99215 OFFICE O/P EST HI 40 MIN: CPT | Performed by: NURSE PRACTITIONER

## 2024-03-08 PROCEDURE — 3044F HG A1C LEVEL LT 7.0%: CPT | Performed by: NURSE PRACTITIONER

## 2024-03-08 PROCEDURE — 3074F SYST BP LT 130 MM HG: CPT | Performed by: NURSE PRACTITIONER

## 2024-03-08 RX ORDER — SITAGLIPTIN AND METFORMIN HYDROCHLORIDE 1000; 100 MG/1; MG/1
1 TABLET, FILM COATED, EXTENDED RELEASE ORAL DAILY
Qty: 90 TABLET | Refills: 1 | Status: SHIPPED | OUTPATIENT
Start: 2024-03-08

## 2024-03-08 ASSESSMENT — PATIENT HEALTH QUESTIONNAIRE - PHQ9
1. LITTLE INTEREST OR PLEASURE IN DOING THINGS: 0
SUM OF ALL RESPONSES TO PHQ QUESTIONS 1-9: 0
SUM OF ALL RESPONSES TO PHQ9 QUESTIONS 1 & 2: 0
2. FEELING DOWN, DEPRESSED OR HOPELESS: 0
SUM OF ALL RESPONSES TO PHQ QUESTIONS 1-9: 0

## 2024-03-08 NOTE — PROGRESS NOTES
1. \"Have you been to the ER, urgent care clinic since your last visit?  Hospitalized since your last visit?\" No    2. \"Have you seen or consulted any other health care providers outside of the [facility] since your last visit?\" No     3. For patients aged 45-75: Has the patient had a colonoscopy / FIT/ Cologuard? Yes - no Care Gap present      If the patient is female:    4. For patients aged 40-74: Has the patient had a mammogram within the past 2 years? Yes - no Care Gap present      5. For patients aged 21-65: Has the patient had a pap smear? Yes - no Care Gap present  
Known Problems Maternal Grandfather     No Known Problems Paternal Grandmother     No Known Problems Paternal Grandfather     No Known Problems Other         REVIEW OF SYSTEMS  Review of Systems  Objective:     /78 (Site: Left Upper Arm, Position: Sitting, Cuff Size: Large Adult)   Pulse 89   Temp 97.2 °F (36.2 °C) (Temporal)   Resp 18   Ht 1.626 m (5' 4\")   Wt 93.1 kg (205 lb 3.2 oz)   SpO2 94%   BMI 35.22 kg/m²    Current Outpatient Medications   Medication Instructions    atorvastatin (LIPITOR) 40 mg, Oral, DAILY    Continuous Blood Gluc  (FREESTYLE ELISE 2 READER) ALYCIA Use to check blood sugar 4-6 times a day    Continuous Blood Gluc Sensor (FREESTYLE ELISE 2 SENSOR) MISC Check blood sugars 2-3 times per week. 1 time fasting (before meals), and 1 time post prandial (1-2 hours after meals). Check blood sugars also when feeling ill.    diclofenac sodium (VOLTAREN) 4 g, Topical, 4 TIMES DAILY PRN    estradiol (ESTRACE) 0.5 mg, Oral, DAILY    gabapentin (NEURONTIN) 300 mg, Oral, DAILY PRN    hydroCHLOROthiazide (HYDRODIURIL) 25 mg, Oral, EVERY MORNING    losartan (COZAAR) 100 mg, Oral, DAILY    losartan-hydroCHLOROthiazide (HYZAAR) 100-25 MG per tablet 1 tablet, Oral, DAILY    metFORMIN (GLUCOPHAGE-XR) 1,000 mg, Oral, DAILY WITH BREAKFAST    Mounjaro 10 mg, SubCUTAneous, WEEKLY    SITagliptin-metFORMIN HCl ER (JANUMET XR) 100-1000 MG TB24 1 tablet, Oral, DAILY        PHYSICAL EXAM  Physical Exam      Disclaimer:    The patient understands our medical plan.  Alternatives have been explained and offered.  The risks, benefits and significant side effects of all medications have been reviewed. Anticipated time course and progression of condition reviewed. All questions have been addressed.  She is encouraged to employ the information provided in the after visit summary, which was reviewed.      Where applicable, she is instructed to call the clinic if she has not been notified either by phone or

## 2024-03-10 ENCOUNTER — PATIENT MESSAGE (OUTPATIENT)
Age: 59
End: 2024-03-10

## 2024-03-10 DIAGNOSIS — M25.552 BILATERAL HIP PAIN: ICD-10-CM

## 2024-03-10 DIAGNOSIS — M25.551 BILATERAL HIP PAIN: ICD-10-CM

## 2024-03-11 RX ORDER — METFORMIN HYDROCHLORIDE 500 MG/1
1000 TABLET, EXTENDED RELEASE ORAL
Qty: 180 TABLET | Refills: 1 | Status: SHIPPED | OUTPATIENT
Start: 2024-03-11

## 2024-03-11 NOTE — TELEPHONE ENCOUNTER
From: Lilly Jeffrey  To: Isael Burgos  Sent: 3/10/2024 7:49 PM EDT  Subject: New prescriptions    Good morning,     I attempted to  my new meds, janumet xr and diclofenance 1% from the pharmacy. however, the Janumet will cost me $1,560.00, which I cannot afford. I haven’t met my deductible. Is it possible to prescribe just Metformin by itself? Before Dr. Hobbs left the practice, she gave me a refil count for 180 tabs, they  2024. I still have these. Should I just take these? Not sure what to do.     Also, I was not able to  the diclofenance because the pharmacist said the percentage was not given, they said they are waiting for your response to fill.    I called around to 10 other pharmacists over the weekend, no one had Mounjaro 10 ml, only 5 ml was found. All ml up to 15 are in shortage.     Please advise.    Thank you

## 2024-03-14 ENCOUNTER — PATIENT MESSAGE (OUTPATIENT)
Age: 59
End: 2024-03-14

## 2024-03-14 DIAGNOSIS — E11.8 TYPE 2 DIABETES MELLITUS WITH UNSPECIFIED COMPLICATIONS (HCC): ICD-10-CM

## 2024-03-14 NOTE — TELEPHONE ENCOUNTER
From: Lilly Jeffrey  To: Isael MENDOZA Loretta  Sent: 3/14/2024 8:22 AM EDT  Subject: Send prescription to [facility]    Good morning,     [employer] has partnered with [facility] for people to have access to Mounjaro meds, Since there is a shortage.     I would like for you to send my prescription to them ASAP so I can get my meds. The longer I wait the longer it will take. See the info below.    Thank you!!    [facility]  [address], [address], [place] [address]  Phone: [phone]  Fax: [phone]

## 2024-03-15 RX ORDER — TIRZEPATIDE 10 MG/.5ML
10 INJECTION, SOLUTION SUBCUTANEOUS WEEKLY
Qty: 12 ADJUSTABLE DOSE PRE-FILLED PEN SYRINGE | Refills: 3 | Status: SHIPPED | OUTPATIENT
Start: 2024-03-15 | End: 2024-06-13

## 2024-04-17 DIAGNOSIS — E66.9 OBESITY (BMI 30-39.9): ICD-10-CM

## 2024-04-17 DIAGNOSIS — I10 ESSENTIAL (PRIMARY) HYPERTENSION: ICD-10-CM

## 2024-04-17 DIAGNOSIS — Z13.29 SCREENING FOR THYROID DISORDER: ICD-10-CM

## 2024-04-17 DIAGNOSIS — E11.8 TYPE 2 DIABETES MELLITUS WITH UNSPECIFIED COMPLICATIONS (HCC): ICD-10-CM

## 2024-06-21 DIAGNOSIS — I10 ESSENTIAL (PRIMARY) HYPERTENSION: ICD-10-CM

## 2024-06-21 RX ORDER — LOSARTAN POTASSIUM AND HYDROCHLOROTHIAZIDE 25; 100 MG/1; MG/1
1 TABLET ORAL DAILY
Qty: 90 TABLET | Refills: 1 | Status: SHIPPED | OUTPATIENT
Start: 2024-06-21

## 2024-07-12 ENCOUNTER — HOSPITAL ENCOUNTER (OUTPATIENT)
Facility: HOSPITAL | Age: 59
Setting detail: SPECIMEN
End: 2024-07-12
Payer: COMMERCIAL

## 2024-07-12 LAB
25(OH)D3 SERPL-MCNC: 55.9 NG/ML (ref 30–100)
ALBUMIN SERPL-MCNC: 3.9 G/DL (ref 3.4–5)
ALBUMIN/GLOB SERPL: 1.1 (ref 0.8–1.7)
ALP SERPL-CCNC: 52 U/L (ref 45–117)
ALT SERPL-CCNC: 31 U/L (ref 13–56)
ANION GAP SERPL CALC-SCNC: 7 MMOL/L (ref 3–18)
AST SERPL-CCNC: 24 U/L (ref 10–38)
BILIRUB SERPL-MCNC: 0.6 MG/DL (ref 0.2–1)
BUN SERPL-MCNC: 19 MG/DL (ref 7–18)
BUN/CREAT SERPL: 17 (ref 12–20)
CALCIUM SERPL-MCNC: 9.8 MG/DL (ref 8.5–10.1)
CHLORIDE SERPL-SCNC: 105 MMOL/L (ref 100–111)
CO2 SERPL-SCNC: 28 MMOL/L (ref 21–32)
CREAT SERPL-MCNC: 1.09 MG/DL (ref 0.6–1.3)
EST. AVERAGE GLUCOSE BLD GHB EST-MCNC: 114 MG/DL
GLOBULIN SER CALC-MCNC: 3.5 G/DL (ref 2–4)
GLUCOSE SERPL-MCNC: 91 MG/DL (ref 74–99)
HBA1C MFR BLD: 5.6 % (ref 4.2–5.6)
POTASSIUM SERPL-SCNC: 4.1 MMOL/L (ref 3.5–5.5)
PROT SERPL-MCNC: 7.4 G/DL (ref 6.4–8.2)
SODIUM SERPL-SCNC: 140 MMOL/L (ref 136–145)
TSH SERPL DL<=0.05 MIU/L-ACNC: 0.86 UIU/ML (ref 0.36–3.74)

## 2024-07-12 PROCEDURE — 80053 COMPREHEN METABOLIC PANEL: CPT

## 2024-07-12 PROCEDURE — 82306 VITAMIN D 25 HYDROXY: CPT

## 2024-07-12 PROCEDURE — 83036 HEMOGLOBIN GLYCOSYLATED A1C: CPT

## 2024-07-12 PROCEDURE — 84443 ASSAY THYROID STIM HORMONE: CPT

## 2024-07-12 PROCEDURE — 36415 COLL VENOUS BLD VENIPUNCTURE: CPT

## 2024-07-17 ENCOUNTER — OFFICE VISIT (OUTPATIENT)
Facility: CLINIC | Age: 59
End: 2024-07-17
Payer: COMMERCIAL

## 2024-07-17 VITALS
TEMPERATURE: 97.8 F | DIASTOLIC BLOOD PRESSURE: 80 MMHG | HEART RATE: 83 BPM | BODY MASS INDEX: 35.13 KG/M2 | WEIGHT: 205.8 LBS | RESPIRATION RATE: 18 BRPM | SYSTOLIC BLOOD PRESSURE: 133 MMHG | OXYGEN SATURATION: 95 % | HEIGHT: 64 IN

## 2024-07-17 DIAGNOSIS — R31.9 HEMATURIA, UNSPECIFIED TYPE: ICD-10-CM

## 2024-07-17 DIAGNOSIS — E78.2 MIXED HYPERLIPIDEMIA: ICD-10-CM

## 2024-07-17 DIAGNOSIS — I10 ESSENTIAL (PRIMARY) HYPERTENSION: Primary | ICD-10-CM

## 2024-07-17 DIAGNOSIS — R01.1 CARDIAC MURMUR: ICD-10-CM

## 2024-07-17 DIAGNOSIS — R35.0 URINARY FREQUENCY: ICD-10-CM

## 2024-07-17 DIAGNOSIS — N32.81 OAB (OVERACTIVE BLADDER): ICD-10-CM

## 2024-07-17 DIAGNOSIS — N95.1 VASOMOTOR SYMPTOMS DUE TO MENOPAUSE: ICD-10-CM

## 2024-07-17 DIAGNOSIS — E11.8 TYPE 2 DIABETES MELLITUS WITH UNSPECIFIED COMPLICATIONS (HCC): ICD-10-CM

## 2024-07-17 LAB
BILIRUBIN, URINE, POC: NEGATIVE
BLOOD URINE, POC: ABNORMAL
GLUCOSE URINE, POC: NEGATIVE
KETONES, URINE, POC: NEGATIVE
LEUKOCYTE ESTERASE, URINE, POC: NEGATIVE
NITRITE, URINE, POC: NEGATIVE
PH, URINE, POC: 6.5 (ref 4.6–8)
PROTEIN,URINE, POC: NEGATIVE
SPECIFIC GRAVITY, URINE, POC: 1.01 (ref 1–1.03)
URINALYSIS CLARITY, POC: ABNORMAL
URINALYSIS COLOR, POC: ABNORMAL
UROBILINOGEN, POC: NORMAL

## 2024-07-17 PROCEDURE — 99215 OFFICE O/P EST HI 40 MIN: CPT | Performed by: NURSE PRACTITIONER

## 2024-07-17 PROCEDURE — 3079F DIAST BP 80-89 MM HG: CPT | Performed by: NURSE PRACTITIONER

## 2024-07-17 PROCEDURE — 81003 URINALYSIS AUTO W/O SCOPE: CPT | Performed by: NURSE PRACTITIONER

## 2024-07-17 PROCEDURE — 3044F HG A1C LEVEL LT 7.0%: CPT | Performed by: NURSE PRACTITIONER

## 2024-07-17 PROCEDURE — 3075F SYST BP GE 130 - 139MM HG: CPT | Performed by: NURSE PRACTITIONER

## 2024-07-17 RX ORDER — METFORMIN HYDROCHLORIDE 500 MG/1
1000 TABLET, EXTENDED RELEASE ORAL
Qty: 180 TABLET | Refills: 1 | Status: SHIPPED | OUTPATIENT
Start: 2024-07-17

## 2024-07-17 RX ORDER — ESTRADIOL 0.5 MG/1
0.5 TABLET ORAL DAILY
Qty: 90 TABLET | Refills: 1 | Status: SHIPPED | OUTPATIENT
Start: 2024-07-17

## 2024-07-17 RX ORDER — ATORVASTATIN CALCIUM 40 MG/1
40 TABLET, FILM COATED ORAL DAILY
Qty: 90 TABLET | Refills: 1 | Status: SHIPPED | OUTPATIENT
Start: 2024-07-17

## 2024-07-17 RX ORDER — TRAMADOL HYDROCHLORIDE 50 MG/1
TABLET ORAL
COMMUNITY

## 2024-07-17 RX ORDER — DIAZEPAM 5 MG/1
TABLET ORAL
COMMUNITY
Start: 2024-07-03

## 2024-07-17 NOTE — PROGRESS NOTES
1. \"Have you been to the ER, urgent care clinic since your last visit?  Hospitalized since your last visit?\" No    2. \"Have you seen or consulted any other health care providers outside of the [facility] since your last visit?\" No     3. For patients aged 45-75: Has the patient had a colonoscopy / FIT/ Cologuard? Yes - no Care Gap present      If the patient is female:    4. For patients aged 40-74: Has the patient had a mammogram within the past 2 years? Yes - no Care Gap present      5. For patients aged 21-65: Has the patient had a pap smear? Yes - no Care Gap present

## 2024-07-17 NOTE — PATIENT INSTRUCTIONS
Diabetes Blood Sugar Emergencies: Your Action Plan  How can you prevent a blood sugar emergency?  An important part of living with diabetes is keeping your blood sugar in your target range. You'll need to know what to do if it's too high or too low. Managing your blood sugar levels helps you avoid emergencies. This care sheet will teach you about the signs of high and low blood sugar. It will help you make an action plan with your doctor for when these signs occur.  Low blood sugar is more likely to happen if you take certain medicines for diabetes. It can also happen if you skip a meal, drink alcohol, or exercise more than usual.  You may get high blood sugar if you eat differently than you normally do. One example is eating more carbohydrate than usual. Having a cold, the flu, or other sudden illness can also cause high blood sugar levels. Levels can also rise if you miss a dose of medicine.  Any change in how you take your medicine may affect your blood sugar level. So it's important to work with your doctor before you make any changes.  Track your blood sugar  Work with your doctor to fill in the blank spaces below that apply to you.   Track your levels, know your target range, and write down ways you can get your blood sugar back in your target range. A log book can help you track your levels. Take the book to all of your medical appointments.  Check your blood sugar _____ times a day, at these times:________________________________________________.  (For example: Before meals, at bedtime, before exercise, during exercise, other.)  Your blood sugar target range before a meal is ___________________. Your blood sugar target range after a meal is _______________________.  Do this--___________________________________________________--to get your blood sugar back within your safe range if your blood sugar results are _________________________________________. (For example: Less than 70 or above 250 mg/dL.)  Call

## 2024-07-17 NOTE — PROGRESS NOTES
Office Note    Assessment & Plan:   diabetes needs further observation and needs to follow diet more regularly, hypertension needs further observation and needs to follow diet more regularly, hyperlipidemia needs further observation and needs to follow diet more regularly.    Diabetic issues reviewed with her: diabetic diet discussed in detail, written exchange diet given, low cholesterol diet, weight control and daily exercise discussed, home glucose monitoring emphasized, all medications, side effects and compliance discussed carefully, diabetic Sick Day rules reviewed, handout given, foot care discussed and Podiatry visits discussed, annual eye examinations at Ophthalmology discussed, glycohemoglobin and other lab monitoring discussed, long term diabetic complications discussed, and labs immediately prior to next visit.   Hypertension issues reviewed with her: Continue current therapy    1. Essential (primary) hypertension  2. Type 2 diabetes mellitus with unspecified complications (HCC)  -     metFORMIN (GLUCOPHAGE-XR) 500 MG extended release tablet; Take 2 tablets by mouth daily (with breakfast), Disp-180 tablet, R-1Normal  -     Tirzepatide (MOUNJARO) 12.5 MG/0.5ML SOPN SC injection; Inject 0.5 mLs into the skin once a week, Disp-4 Adjustable Dose Pre-filled Pen Syringe, R-1Normal  -     [facility] DIABETES EDUCATION  -      DIABETES FOOT EXAM  3. Mixed hyperlipidemia  -     atorvastatin (LIPITOR) 40 MG tablet; Take 1 tablet by mouth daily, Disp-90 tablet, R-1Normal  4. Cardiac murmur  5. Vasomotor symptoms due to menopause  -     estradiol (ESTRACE) 0.5 MG tablet; Take 1 tablet by mouth daily, Disp-90 tablet, R-1Normal  6. Urinary frequency  -     AMB POC URINALYSIS DIP STICK AUTO W/O MICRO  -     [facility]  7. OAB (overactive bladder)  -     AMB POC URINALYSIS DIP STICK AUTO W/O MICRO  -     [facility]  8. Hematuria, unspecified type  -     [facility]

## 2024-07-24 PROBLEM — N32.81 OAB (OVERACTIVE BLADDER): Status: ACTIVE | Noted: 2024-07-24

## 2024-07-24 PROBLEM — R31.9 HEMATURIA: Status: ACTIVE | Noted: 2024-07-24

## 2024-07-24 PROBLEM — R35.0 URINARY FREQUENCY: Status: ACTIVE | Noted: 2024-07-24

## 2024-08-02 ENCOUNTER — TRANSCRIBE ORDERS (OUTPATIENT)
Facility: HOSPITAL | Age: 59
End: 2024-08-02

## 2024-08-02 DIAGNOSIS — Z12.31 VISIT FOR SCREENING MAMMOGRAM: Primary | ICD-10-CM

## 2024-08-12 DIAGNOSIS — E11.8 TYPE 2 DIABETES MELLITUS WITH UNSPECIFIED COMPLICATIONS (HCC): ICD-10-CM

## 2024-08-12 NOTE — TELEPHONE ENCOUNTER
Pt requesting 3 month supply.    Requested Prescriptions     Pending Prescriptions Disp Refills    Tirzepatide (MOUNJARO) 12.5 MG/0.5ML SOPN SC injection 4 Adjustable Dose Pre-filled Pen Syringe 1     Sig: Inject 0.5 mLs into the skin once a week

## 2024-08-19 DIAGNOSIS — E11.8 TYPE 2 DIABETES MELLITUS WITH UNSPECIFIED COMPLICATIONS (HCC): ICD-10-CM

## 2024-08-19 NOTE — TELEPHONE ENCOUNTER
Pt requesting 3 month supply of Mounjaro, stating her insurance will cover 3 month fill.     Last Visit: 7/17/24   Next Appointment: 1/17/25    Requested Prescriptions     Pending Prescriptions Disp Refills    Tirzepatide (MOUNJARO) 12.5 MG/0.5ML SOPN SC injection 4 Adjustable Dose Pre-filled Pen Syringe 1     Sig: Inject 0.5 mLs into the skin once a week

## 2024-10-25 ENCOUNTER — HOSPITAL ENCOUNTER (OUTPATIENT)
Facility: HOSPITAL | Age: 59
Discharge: HOME | End: 2024-10-28

## 2024-10-25 DIAGNOSIS — Z12.31 VISIT FOR SCREENING MAMMOGRAM: ICD-10-CM

## 2024-11-08 ENCOUNTER — HOSPITAL ENCOUNTER (OUTPATIENT)
Facility: HOSPITAL | Age: 59
Discharge: HOME | End: 2024-11-11
Payer: COMMERCIAL

## 2024-11-08 VITALS — BODY MASS INDEX: 34.83 KG/M2 | WEIGHT: 204 LBS | HEIGHT: 64 IN

## 2024-11-08 DIAGNOSIS — N64.52 NIPPLE DISCHARGE: ICD-10-CM

## 2024-11-08 PROCEDURE — G0279 TOMOSYNTHESIS, MAMMO: HCPCS

## 2024-11-08 PROCEDURE — 76642 ULTRASOUND BREAST LIMITED: CPT

## 2024-11-26 DIAGNOSIS — I10 ESSENTIAL (PRIMARY) HYPERTENSION: ICD-10-CM

## 2024-11-26 DIAGNOSIS — E11.8 TYPE 2 DIABETES MELLITUS WITH UNSPECIFIED COMPLICATIONS (HCC): ICD-10-CM

## 2024-11-27 RX ORDER — TIRZEPATIDE 12.5 MG/.5ML
INJECTION, SOLUTION SUBCUTANEOUS
Qty: 12 ML | Refills: 5 | Status: SHIPPED | OUTPATIENT
Start: 2024-11-27

## 2024-11-27 RX ORDER — LOSARTAN POTASSIUM AND HYDROCHLOROTHIAZIDE 25; 100 MG/1; MG/1
1 TABLET ORAL DAILY
Qty: 90 TABLET | Refills: 1 | Status: SHIPPED | OUTPATIENT
Start: 2024-11-27

## 2024-12-17 ENCOUNTER — HOSPITAL ENCOUNTER (OUTPATIENT)
Facility: HOSPITAL | Age: 59
Discharge: HOME | End: 2024-12-20
Payer: COMMERCIAL

## 2024-12-17 DIAGNOSIS — N63.0 BREAST MASS IN FEMALE: ICD-10-CM

## 2024-12-17 DIAGNOSIS — R92.8 ABNORMAL MAMMOGRAM: ICD-10-CM

## 2024-12-17 PROCEDURE — A4648 IMPLANTABLE TISSUE MARKER: HCPCS

## 2024-12-17 PROCEDURE — 77065 DX MAMMO INCL CAD UNI: CPT

## 2024-12-17 PROCEDURE — 6360000002 HC RX W HCPCS: Performed by: NURSE PRACTITIONER

## 2024-12-17 PROCEDURE — 88305 TISSUE EXAM BY PATHOLOGIST: CPT

## 2024-12-17 RX ORDER — LIDOCAINE HYDROCHLORIDE 10 MG/ML
10 INJECTION, SOLUTION EPIDURAL; INFILTRATION; INTRACAUDAL; PERINEURAL ONCE
Status: COMPLETED | OUTPATIENT
Start: 2024-12-17 | End: 2024-12-17

## 2024-12-17 RX ORDER — LIDOCAINE HYDROCHLORIDE AND EPINEPHRINE 10; 10 MG/ML; UG/ML
20 INJECTION, SOLUTION INFILTRATION; PERINEURAL ONCE
Status: COMPLETED | OUTPATIENT
Start: 2024-12-17 | End: 2024-12-17

## 2024-12-17 RX ADMIN — LIDOCAINE HYDROCHLORIDE,EPINEPHRINE BITARTRATE 20 ML: 10; .01 INJECTION, SOLUTION INFILTRATION; PERINEURAL at 10:44

## 2024-12-17 RX ADMIN — LIDOCAINE HYDROCHLORIDE 10 ML: 10 INJECTION, SOLUTION EPIDURAL; INFILTRATION; INTRACAUDAL; PERINEURAL at 10:44

## 2024-12-20 ENCOUNTER — TELEPHONE (OUTPATIENT)
Facility: HOSPITAL | Age: 59
End: 2024-12-20

## 2024-12-20 NOTE — TELEPHONE ENCOUNTER
Called MsDennise  to inform her of benign breast biopsy results. Explained pathology findings and recommendation for follow up surgical consult for atypical finding. She expressed understanding and has been referred to Dr. Myers.

## 2025-01-08 ENCOUNTER — HOSPITAL ENCOUNTER (OUTPATIENT)
Facility: HOSPITAL | Age: 60
Setting detail: SPECIMEN
Discharge: HOME | End: 2025-01-11
Payer: COMMERCIAL

## 2025-01-08 DIAGNOSIS — I10 ESSENTIAL (PRIMARY) HYPERTENSION: ICD-10-CM

## 2025-01-08 DIAGNOSIS — E11.8 TYPE 2 DIABETES MELLITUS WITH UNSPECIFIED COMPLICATIONS (HCC): ICD-10-CM

## 2025-01-08 DIAGNOSIS — E78.2 MIXED HYPERLIPIDEMIA: ICD-10-CM

## 2025-01-08 DIAGNOSIS — Z13.0 SCREENING FOR DEFICIENCY ANEMIA: ICD-10-CM

## 2025-01-08 DIAGNOSIS — I10 ESSENTIAL (PRIMARY) HYPERTENSION: Primary | ICD-10-CM

## 2025-01-08 LAB
ALBUMIN SERPL-MCNC: 4 G/DL (ref 3.4–5)
ALBUMIN/GLOB SERPL: 1.1 (ref 0.8–1.7)
ALP SERPL-CCNC: 54 U/L (ref 45–117)
ALT SERPL-CCNC: 26 U/L (ref 13–56)
ANION GAP SERPL CALC-SCNC: 6 MMOL/L (ref 3–18)
AST SERPL-CCNC: 19 U/L (ref 10–38)
BILIRUB SERPL-MCNC: 0.6 MG/DL (ref 0.2–1)
BUN SERPL-MCNC: 18 MG/DL (ref 7–18)
BUN/CREAT SERPL: 13 (ref 12–20)
CALCIUM SERPL-MCNC: 9.9 MG/DL (ref 8.5–10.1)
CHLORIDE SERPL-SCNC: 102 MMOL/L (ref 100–111)
CHOLEST SERPL-MCNC: 175 MG/DL
CO2 SERPL-SCNC: 30 MMOL/L (ref 21–32)
CREAT SERPL-MCNC: 1.37 MG/DL (ref 0.6–1.3)
CREAT UR-MCNC: 372 MG/DL (ref 30–125)
ERYTHROCYTE [DISTWIDTH] IN BLOOD BY AUTOMATED COUNT: 12.7 % (ref 11.6–14.5)
EST. AVERAGE GLUCOSE BLD GHB EST-MCNC: 111 MG/DL
GLOBULIN SER CALC-MCNC: 3.7 G/DL (ref 2–4)
GLUCOSE SERPL-MCNC: 105 MG/DL (ref 74–99)
HBA1C MFR BLD: 5.5 % (ref 4.2–5.6)
HCT VFR BLD AUTO: 40.5 % (ref 35–45)
HDLC SERPL-MCNC: 54 MG/DL (ref 40–60)
HDLC SERPL: 3.2 (ref 0–5)
HGB BLD-MCNC: 12.5 G/DL (ref 12–16)
LDLC SERPL CALC-MCNC: 82.2 MG/DL (ref 0–100)
LIPID PANEL: ABNORMAL
MCH RBC QN AUTO: 28.4 PG (ref 24–34)
MCHC RBC AUTO-ENTMCNC: 30.9 G/DL (ref 31–37)
MCV RBC AUTO: 92 FL (ref 78–100)
MICROALBUMIN UR-MCNC: 2.3 MG/DL (ref 0–3)
MICROALBUMIN/CREAT UR-RTO: 6 MG/G (ref 0–30)
NRBC # BLD: 0 K/UL (ref 0–0.01)
NRBC BLD-RTO: 0 PER 100 WBC
PLATELET # BLD AUTO: 323 K/UL (ref 135–420)
PMV BLD AUTO: 9.7 FL (ref 9.2–11.8)
POTASSIUM SERPL-SCNC: 4 MMOL/L (ref 3.5–5.5)
PROT SERPL-MCNC: 7.7 G/DL (ref 6.4–8.2)
RBC # BLD AUTO: 4.4 M/UL (ref 4.2–5.3)
SODIUM SERPL-SCNC: 138 MMOL/L (ref 136–145)
TRIGL SERPL-MCNC: 194 MG/DL
VLDLC SERPL CALC-MCNC: 38.8 MG/DL
WBC # BLD AUTO: 6.4 K/UL (ref 4.6–13.2)

## 2025-01-08 PROCEDURE — 85027 COMPLETE CBC AUTOMATED: CPT

## 2025-01-08 PROCEDURE — 82570 ASSAY OF URINE CREATININE: CPT

## 2025-01-08 PROCEDURE — 82043 UR ALBUMIN QUANTITATIVE: CPT

## 2025-01-08 PROCEDURE — 36415 COLL VENOUS BLD VENIPUNCTURE: CPT

## 2025-01-08 PROCEDURE — 80053 COMPREHEN METABOLIC PANEL: CPT

## 2025-01-08 PROCEDURE — 80061 LIPID PANEL: CPT

## 2025-01-08 PROCEDURE — 83036 HEMOGLOBIN GLYCOSYLATED A1C: CPT

## 2025-01-17 ENCOUNTER — OFFICE VISIT (OUTPATIENT)
Facility: CLINIC | Age: 60
End: 2025-01-17

## 2025-01-17 VITALS
HEIGHT: 64 IN | OXYGEN SATURATION: 97 % | SYSTOLIC BLOOD PRESSURE: 128 MMHG | WEIGHT: 206.6 LBS | HEART RATE: 84 BPM | DIASTOLIC BLOOD PRESSURE: 79 MMHG | RESPIRATION RATE: 18 BRPM | BODY MASS INDEX: 35.27 KG/M2

## 2025-01-17 DIAGNOSIS — R09.89 LEFT CAROTID BRUIT: ICD-10-CM

## 2025-01-17 DIAGNOSIS — I10 ESSENTIAL (PRIMARY) HYPERTENSION: Primary | ICD-10-CM

## 2025-01-17 DIAGNOSIS — N95.1 VASOMOTOR SYMPTOMS DUE TO MENOPAUSE: ICD-10-CM

## 2025-01-17 DIAGNOSIS — E11.8 TYPE 2 DIABETES MELLITUS WITH UNSPECIFIED COMPLICATIONS (HCC): ICD-10-CM

## 2025-01-17 DIAGNOSIS — Z23 ENCOUNTER FOR IMMUNIZATION: ICD-10-CM

## 2025-01-17 DIAGNOSIS — N18.32 TYPE 2 DIABETES MELLITUS WITH STAGE 3B CHRONIC KIDNEY DISEASE, WITHOUT LONG-TERM CURRENT USE OF INSULIN (HCC): ICD-10-CM

## 2025-01-17 DIAGNOSIS — R01.1 CARDIAC MURMUR: ICD-10-CM

## 2025-01-17 DIAGNOSIS — E11.22 TYPE 2 DIABETES MELLITUS WITH STAGE 3B CHRONIC KIDNEY DISEASE, WITHOUT LONG-TERM CURRENT USE OF INSULIN (HCC): ICD-10-CM

## 2025-01-17 DIAGNOSIS — E78.2 MIXED HYPERLIPIDEMIA: ICD-10-CM

## 2025-01-17 RX ORDER — ESTRADIOL 0.5 MG/1
0.5 TABLET ORAL DAILY
Qty: 90 TABLET | Refills: 1 | Status: SHIPPED | OUTPATIENT
Start: 2025-01-17

## 2025-01-17 RX ORDER — ONDANSETRON 8 MG/1
8 TABLET, ORALLY DISINTEGRATING ORAL EVERY 8 HOURS PRN
Qty: 30 TABLET | Refills: 0 | Status: SHIPPED | OUTPATIENT
Start: 2025-01-17

## 2025-01-17 RX ORDER — ATORVASTATIN CALCIUM 40 MG/1
40 TABLET, FILM COATED ORAL DAILY
Qty: 90 TABLET | Refills: 1 | Status: SHIPPED | OUTPATIENT
Start: 2025-01-17

## 2025-01-17 RX ORDER — LOSARTAN POTASSIUM AND HYDROCHLOROTHIAZIDE 25; 100 MG/1; MG/1
1 TABLET ORAL DAILY
Qty: 90 TABLET | Refills: 1 | Status: SHIPPED | OUTPATIENT
Start: 2025-01-17

## 2025-01-17 ASSESSMENT — PATIENT HEALTH QUESTIONNAIRE - PHQ9
SUM OF ALL RESPONSES TO PHQ QUESTIONS 1-9: 0
SUM OF ALL RESPONSES TO PHQ9 QUESTIONS 1 & 2: 0
2. FEELING DOWN, DEPRESSED OR HOPELESS: NOT AT ALL
1. LITTLE INTEREST OR PLEASURE IN DOING THINGS: NOT AT ALL
SUM OF ALL RESPONSES TO PHQ QUESTIONS 1-9: 0

## 2025-01-17 NOTE — PROGRESS NOTES
\"Have you been to the ER, urgent care clinic since your last visit?  Hospitalized since your last visit?\"    YES - When: approximately 5 months ago.  Where and Why: [facility] for [facility].    “Have you seen or consulted any other health care providers outside our system since your last visit?”    NO      “Have you had a diabetic eye exam?”    NO     Date of last diabetic eye exam: 11/11/2022

## 2025-01-17 NOTE — PROGRESS NOTES
Office Note    Assessment & Plan:   diabetes improved, hypertension needs further observation and needs to follow diet more regularly, hyperlipidemia needs further observation and needs to follow diet more regularly.    Diabetic issues reviewed with her: diabetic diet discussed in detail, written exchange diet given, low cholesterol diet, weight control and daily exercise discussed, home glucose monitoring emphasized, all medications, side effects and compliance discussed carefully, diabetic Sick Day rules reviewed, handout given, foot care discussed and Podiatry visits discussed, annual eye examinations at Ophthalmology discussed, glycohemoglobin and other lab monitoring discussed, patient urged in the strongest terms to quit smoking, and labs immediately prior to next visit.   Hypertension issues reviewed with her: Continue current therapy    1. Essential (primary) hypertension  -     losartan-hydroCHLOROthiazide (HYZAAR) 100-25 MG per tablet; Take 1 tablet by mouth daily, Disp-90 tablet, R-1Normal  2. Type 2 diabetes mellitus with stage 3b chronic kidney disease, without long-term current use of insulin (HCC)  -     External Referral To Nephrology  -     semaglutide, 2 MG/DOSE, (OZEMPIC) 8 MG/3ML SOPN sc injection; Inject 2 mg into the skin every 7 days, Disp-12 mL, R-5Normal  3. Mixed hyperlipidemia  -     atorvastatin (LIPITOR) 40 MG tablet; Take 1 tablet by mouth daily, Disp-90 tablet, R-1Normal  4. Cardiac murmur  5. Vasomotor symptoms due to menopause  -     estradiol (ESTRACE) 0.5 MG tablet; Take 1 tablet by mouth daily, Disp-90 tablet, R-1Normal  6. Type 2 diabetes mellitus with unspecified complications (HCC)  -     Continuous Glucose  (FREESTYLE ELISE 2 READER) ALYCIA; Use to check blood sugar 4-6 times a day, Disp-1 each, R-0Normal  -     Continuous Glucose Sensor (FREESTYLE ELISE 2 SENSOR) MISC; Check blood sugars 2-3 times per week. 1 time fasting (before meals), and 1 time post prandial (1-2

## 2025-01-20 ENCOUNTER — OFFICE VISIT (OUTPATIENT)
Age: 60
End: 2025-01-20
Payer: COMMERCIAL

## 2025-01-20 ENCOUNTER — PATIENT MESSAGE (OUTPATIENT)
Facility: CLINIC | Age: 60
End: 2025-01-20

## 2025-01-20 VITALS
OXYGEN SATURATION: 97 % | HEART RATE: 84 BPM | BODY MASS INDEX: 35.17 KG/M2 | DIASTOLIC BLOOD PRESSURE: 80 MMHG | TEMPERATURE: 97.1 F | WEIGHT: 206 LBS | HEIGHT: 64 IN | SYSTOLIC BLOOD PRESSURE: 130 MMHG | RESPIRATION RATE: 14 BRPM

## 2025-01-20 DIAGNOSIS — Z80.3 FAMILY HISTORY OF BREAST CANCER IN MOTHER: ICD-10-CM

## 2025-01-20 DIAGNOSIS — N60.91 ATYPICAL DUCTAL HYPERPLASIA OF RIGHT BREAST: Primary | ICD-10-CM

## 2025-01-20 DIAGNOSIS — R92.8 ABNORMAL MAMMOGRAM OF LEFT BREAST: ICD-10-CM

## 2025-01-20 DIAGNOSIS — D24.1 INTRADUCTAL PAPILLOMA OF BREAST, RIGHT: ICD-10-CM

## 2025-01-20 DIAGNOSIS — N64.52 DISCHARGE FROM RIGHT NIPPLE: ICD-10-CM

## 2025-01-20 PROCEDURE — 99204 OFFICE O/P NEW MOD 45 MIN: CPT | Performed by: SURGERY

## 2025-01-20 PROCEDURE — 3079F DIAST BP 80-89 MM HG: CPT | Performed by: SURGERY

## 2025-01-20 PROCEDURE — 3075F SYST BP GE 130 - 139MM HG: CPT | Performed by: SURGERY

## 2025-01-20 ASSESSMENT — ENCOUNTER SYMPTOMS: SHORTNESS OF BREATH: 0

## 2025-01-20 NOTE — PROGRESS NOTES
Lilly Jeffrey is a 59 y.o. female (: 1965)     Chief Complaint   Patient presents with    New Patient     Right breast        Medication list and allergies have been reviewed with Lilly Jeffrey and updated as of today's date.     I have gone over all Medical, Surgical and Social History with Lilly Jeffrey and updated/added the information accordingly.

## 2025-01-20 NOTE — PROGRESS NOTES
CC:   Chief Complaint   Patient presents with    New Patient     Right breast         Assessment:    ICD-10-CM    1. Atypical ductal hyperplasia of right breast  N60.91 SCHEDULE SURGERY     External Referral To Oncology      2. Intraductal papilloma of breast, right  D24.1       3. Discharge from right nipple  N64.52       4. Family history of breast cancer in mother  Z80.3       5. Abnormal mammogram of left breast  R92.8 TRUDI RIVKA DIGITAL DIAGNOSTIC UNILATERAL LEFT     US BREAST LIMITED LEFT          Plan: We discussed the results of atypical ductal hyperplasia (ADH) and papilloma in detail. We discussed that generally ADH is not considered cancer or pre-cancer, but rather a high risk marker. A woman with ADH may have a higher than average risk of developing breast cancer. We discussed that treatment can range from observation alone to medication.  Removal of the breasts (mastectomy) is not needed or recommended for ADH alone. When diagnosed on a core biopsy, excision is generally recommended to ensure removal of abnormal cells as well as to avoid undersampling of ADH associated with cancer. She agreed to left breast localized excisional biopsy. The risks and benefits of the procedure were reviewed with the patient including infection, bleeding, need for repeat procedure, injury to surrounding structures. Post operative expectations were reviewed. We discussed high risk screening going forward and referral to medical oncology which she is agreeable to. I have recommended she stop estrace which she is agreeable to and discuss with her GYN going forward alternatives as she is high risk for breast cancer. Repeat mammogram left side in 6 months is also recommended- this has been ordered, possible simple cyst noted on the left.        HPI:  Lilly Jeffrey is a 59 y.o. female who is here to discuss recent biopsy results. She did notice clear nipple discharge right nipple in November one time which triggered

## 2025-01-23 ENCOUNTER — TELEPHONE (OUTPATIENT)
Facility: CLINIC | Age: 60
End: 2025-01-23

## 2025-01-23 NOTE — TELEPHONE ENCOUNTER
Dolly from [facility] called to get clarification on medication   Tirzepatide 12.5 MG/0.5ML SOA [[id number]] .

## 2025-01-23 NOTE — TELEPHONE ENCOUNTER
Patient called stating she will need prior-auth on her Tirzepatide 12.5 MG/0.5ML SOAJ [[id number]]

## 2025-01-27 ENCOUNTER — CLINICAL DOCUMENTATION (OUTPATIENT)
Facility: CLINIC | Age: 60
End: 2025-01-27

## 2025-01-27 NOTE — PROGRESS NOTES
Approved  PA Detail   Prior authorization approved  Payer: [employer] [facility] Case ID: [id number]  Note from payer: CaseId:[id number];Status:Approved;Review Type:Prior Auth;Coverage Start Date:2025;Coverage End Date:2026;  Approval Details    Authorized from 2025 to 2026  Electronic appeal: Not supported  Prior auth initiated by: [place] [id number] [facility] [phone] -  [phone]    [phone]  View History  Medication Being Authorized    semaglutide, 2 MG/DOSE, (OZEMPIC) 8 MG/3ML SOPN sc injection  Inject 2 mg into the skin every 7 days  Dispense: 12 mL Refills: 5   Start: 2025   Class: Normal Diagnoses: Type 2 diabetes mellitus with stage 3b chronic kidney disease, without long-term current use of insulin (HCC)   This order has been released to its destination.  To be filled at: [address] [facility] [phone] -  [phone]  Prior Authorization History for semaglutide, 2 MG/DOSE, (OZEMPIC) 8 MG/3ML SOPN sc injection    1 week ago Closed - Prior Authorization not required for patient/medication  Pharmacy Benefits   Open Encounter CAMILO HEATH  -  Adriana Payer [facility] [id number] ([employer])    Covered: Retail, Mail Order    Unknown: Specialty, Long-Term Care  Member ID: [id number] BIN: [id number] : 1965   Group ID: [id number] PCN: [id number] Legal sex: F   [facility]   Address: [address] [facility]

## 2025-04-11 ENCOUNTER — PREP FOR PROCEDURE (OUTPATIENT)
Age: 60
End: 2025-04-11

## 2025-04-11 ENCOUNTER — HOSPITAL ENCOUNTER (OUTPATIENT)
Facility: HOSPITAL | Age: 60
Discharge: HOME | End: 2025-04-14
Attending: SURGERY
Payer: COMMERCIAL

## 2025-04-11 DIAGNOSIS — R92.8 ABNORMAL MAMMOGRAM: ICD-10-CM

## 2025-04-11 DIAGNOSIS — D36.9 PAPILLOMA: ICD-10-CM

## 2025-04-11 DIAGNOSIS — N60.91 ATYPICAL DUCTAL HYPERPLASIA OF RIGHT BREAST: ICD-10-CM

## 2025-04-11 PROCEDURE — 19281 PERQ DEVICE BREAST 1ST IMAG: CPT

## 2025-04-11 PROCEDURE — 6360000002 HC RX W HCPCS: Performed by: SURGERY

## 2025-04-11 RX ADMIN — LIDOCAINE HYDROCHLORIDE 8 ML: 10 INJECTION, SOLUTION EPIDURAL; INFILTRATION; INTRACAUDAL; PERINEURAL at 09:15

## 2025-04-11 NOTE — PERIOP NOTE
surgery.   17. TWO VISITORS will be allowed in the waiting area during your surgery.  Exceptions may be made for surgical admissions, per nursing unit guidelines      Special Instructions:      Bring a list of CURRENT medications.  Follow instructions from the office regarding Blood Thinners and/or Insulin  Follow instructions from the office regarding medications to take the morning of surgery.       On day of surgery if you are running late, unable to make procedure time, or sick, please call the Pre-op department at [phone]    These surgical instructions were reviewed with Lilly Jeffrey during the [facility] phone call.

## 2025-04-23 ENCOUNTER — ANESTHESIA EVENT (OUTPATIENT)
Facility: HOSPITAL | Age: 60
End: 2025-04-23
Payer: COMMERCIAL

## 2025-04-24 ENCOUNTER — HOSPITAL ENCOUNTER (OUTPATIENT)
Facility: HOSPITAL | Age: 60
Setting detail: OUTPATIENT SURGERY
Discharge: HOME | End: 2025-04-24
Attending: SURGERY | Admitting: SURGERY
Payer: COMMERCIAL

## 2025-04-24 ENCOUNTER — APPOINTMENT (OUTPATIENT)
Facility: HOSPITAL | Age: 60
End: 2025-04-24
Attending: SURGERY
Payer: COMMERCIAL

## 2025-04-24 ENCOUNTER — ANESTHESIA (OUTPATIENT)
Facility: HOSPITAL | Age: 60
End: 2025-04-24
Payer: COMMERCIAL

## 2025-04-24 VITALS
BODY MASS INDEX: 36.47 KG/M2 | HEART RATE: 77 BPM | TEMPERATURE: 98.2 F | RESPIRATION RATE: 17 BRPM | HEIGHT: 64 IN | OXYGEN SATURATION: 98 % | SYSTOLIC BLOOD PRESSURE: 127 MMHG | DIASTOLIC BLOOD PRESSURE: 78 MMHG | WEIGHT: 213.6 LBS

## 2025-04-24 DIAGNOSIS — R92.8 ABNORMAL FINDINGS ON DIAGNOSTIC IMAGING OF BREAST: ICD-10-CM

## 2025-04-24 DIAGNOSIS — N60.91 ATYPICAL DUCTAL HYPERPLASIA OF RIGHT BREAST: Primary | ICD-10-CM

## 2025-04-24 LAB
EST. AVERAGE GLUCOSE BLD GHB EST-MCNC: 126 MG/DL
GLUCOSE BLD STRIP.AUTO-MCNC: 130 MG/DL (ref 70–110)
GLUCOSE BLD STRIP.AUTO-MCNC: 154 MG/DL (ref 70–110)
HBA1C MFR BLD: 6 % (ref 4.2–5.6)

## 2025-04-24 PROCEDURE — 19125 EXCISION BREAST LESION: CPT | Performed by: SURGERY

## 2025-04-24 PROCEDURE — 2500000003 HC RX 250 WO HCPCS: Performed by: SURGERY

## 2025-04-24 PROCEDURE — 2580000003 HC RX 258: Performed by: NURSE ANESTHETIST, CERTIFIED REGISTERED

## 2025-04-24 PROCEDURE — 3700000000 HC ANESTHESIA ATTENDED CARE: Performed by: SURGERY

## 2025-04-24 PROCEDURE — 6370000000 HC RX 637 (ALT 250 FOR IP): Performed by: NURSE ANESTHETIST, CERTIFIED REGISTERED

## 2025-04-24 PROCEDURE — 76098 X-RAY EXAM SURGICAL SPECIMEN: CPT

## 2025-04-24 PROCEDURE — 6360000002 HC RX W HCPCS: Performed by: SURGERY

## 2025-04-24 PROCEDURE — 88307 TISSUE EXAM BY PATHOLOGIST: CPT

## 2025-04-24 PROCEDURE — 83036 HEMOGLOBIN GLYCOSYLATED A1C: CPT

## 2025-04-24 PROCEDURE — 2720000010 HC SURG SUPPLY STERILE: Performed by: SURGERY

## 2025-04-24 PROCEDURE — 2709999900 HC NON-CHARGEABLE SUPPLY: Performed by: SURGERY

## 2025-04-24 PROCEDURE — 6360000002 HC RX W HCPCS: Performed by: NURSE ANESTHETIST, CERTIFIED REGISTERED

## 2025-04-24 PROCEDURE — 88305 TISSUE EXAM BY PATHOLOGIST: CPT

## 2025-04-24 PROCEDURE — 7100000011 HC PHASE II RECOVERY - ADDTL 15 MIN: Performed by: SURGERY

## 2025-04-24 PROCEDURE — 82962 GLUCOSE BLOOD TEST: CPT

## 2025-04-24 PROCEDURE — 3600000012 HC SURGERY LEVEL 2 ADDTL 15MIN: Performed by: SURGERY

## 2025-04-24 PROCEDURE — 3700000001 HC ADD 15 MINUTES (ANESTHESIA): Performed by: SURGERY

## 2025-04-24 PROCEDURE — 7100000001 HC PACU RECOVERY - ADDTL 15 MIN: Performed by: SURGERY

## 2025-04-24 PROCEDURE — 3600000002 HC SURGERY LEVEL 2 BASE: Performed by: SURGERY

## 2025-04-24 PROCEDURE — 7100000000 HC PACU RECOVERY - FIRST 15 MIN: Performed by: SURGERY

## 2025-04-24 PROCEDURE — 7100000010 HC PHASE II RECOVERY - FIRST 15 MIN: Performed by: SURGERY

## 2025-04-24 RX ORDER — SODIUM CHLORIDE 0.9 % (FLUSH) 0.9 %
5-40 SYRINGE (ML) INJECTION EVERY 12 HOURS SCHEDULED
Status: DISCONTINUED | OUTPATIENT
Start: 2025-04-24 | End: 2025-04-24 | Stop reason: HOSPADM

## 2025-04-24 RX ORDER — LIDOCAINE HYDROCHLORIDE 10 MG/ML
1 INJECTION, SOLUTION EPIDURAL; INFILTRATION; INTRACAUDAL; PERINEURAL
Status: DISCONTINUED | OUTPATIENT
Start: 2025-04-24 | End: 2025-04-24 | Stop reason: HOSPADM

## 2025-04-24 RX ORDER — FENTANYL CITRATE 50 UG/ML
INJECTION, SOLUTION INTRAMUSCULAR; INTRAVENOUS
Status: DISCONTINUED | OUTPATIENT
Start: 2025-04-24 | End: 2025-04-24 | Stop reason: SDUPTHER

## 2025-04-24 RX ORDER — DEXTROSE MONOHYDRATE 100 MG/ML
INJECTION, SOLUTION INTRAVENOUS CONTINUOUS PRN
Status: DISCONTINUED | OUTPATIENT
Start: 2025-04-24 | End: 2025-04-24 | Stop reason: HOSPADM

## 2025-04-24 RX ORDER — FENTANYL CITRATE 50 UG/ML
50 INJECTION, SOLUTION INTRAMUSCULAR; INTRAVENOUS EVERY 5 MIN PRN
Status: DISCONTINUED | OUTPATIENT
Start: 2025-04-24 | End: 2025-04-24 | Stop reason: HOSPADM

## 2025-04-24 RX ORDER — SODIUM CHLORIDE, SODIUM LACTATE, POTASSIUM CHLORIDE, CALCIUM CHLORIDE 600; 310; 30; 20 MG/100ML; MG/100ML; MG/100ML; MG/100ML
INJECTION, SOLUTION INTRAVENOUS CONTINUOUS
Status: DISCONTINUED | OUTPATIENT
Start: 2025-04-24 | End: 2025-04-24 | Stop reason: HOSPADM

## 2025-04-24 RX ORDER — ONDANSETRON 2 MG/ML
INJECTION INTRAMUSCULAR; INTRAVENOUS
Status: DISCONTINUED | OUTPATIENT
Start: 2025-04-24 | End: 2025-04-24 | Stop reason: SDUPTHER

## 2025-04-24 RX ORDER — INSULIN LISPRO 100 [IU]/ML
0-15 INJECTION, SOLUTION INTRAVENOUS; SUBCUTANEOUS ONCE
Status: COMPLETED | OUTPATIENT
Start: 2025-04-24 | End: 2025-04-24

## 2025-04-24 RX ORDER — SODIUM CHLORIDE 9 MG/ML
INJECTION, SOLUTION INTRAVENOUS PRN
Status: DISCONTINUED | OUTPATIENT
Start: 2025-04-24 | End: 2025-04-24 | Stop reason: HOSPADM

## 2025-04-24 RX ORDER — MIDAZOLAM HYDROCHLORIDE 1 MG/ML
INJECTION, SOLUTION INTRAMUSCULAR; INTRAVENOUS
Status: DISCONTINUED | OUTPATIENT
Start: 2025-04-24 | End: 2025-04-24 | Stop reason: SDUPTHER

## 2025-04-24 RX ORDER — SODIUM CHLORIDE 0.9 % (FLUSH) 0.9 %
5-40 SYRINGE (ML) INJECTION PRN
Status: DISCONTINUED | OUTPATIENT
Start: 2025-04-24 | End: 2025-04-24 | Stop reason: HOSPADM

## 2025-04-24 RX ORDER — DIPHENHYDRAMINE HYDROCHLORIDE 50 MG/ML
12.5 INJECTION, SOLUTION INTRAMUSCULAR; INTRAVENOUS
Status: DISCONTINUED | OUTPATIENT
Start: 2025-04-24 | End: 2025-04-24 | Stop reason: HOSPADM

## 2025-04-24 RX ORDER — DEXAMETHASONE SODIUM PHOSPHATE 4 MG/ML
INJECTION, SOLUTION INTRA-ARTICULAR; INTRALESIONAL; INTRAMUSCULAR; INTRAVENOUS; SOFT TISSUE
Status: DISCONTINUED | OUTPATIENT
Start: 2025-04-24 | End: 2025-04-24 | Stop reason: SDUPTHER

## 2025-04-24 RX ORDER — PROCHLORPERAZINE EDISYLATE 5 MG/ML
5 INJECTION INTRAMUSCULAR; INTRAVENOUS
Status: DISCONTINUED | OUTPATIENT
Start: 2025-04-24 | End: 2025-04-24 | Stop reason: HOSPADM

## 2025-04-24 RX ORDER — HYDROCODONE BITARTRATE AND ACETAMINOPHEN 5; 325 MG/1; MG/1
1 TABLET ORAL EVERY 4 HOURS PRN
Qty: 18 TABLET | Refills: 0 | Status: SHIPPED | OUTPATIENT
Start: 2025-04-24 | End: 2025-04-27

## 2025-04-24 RX ORDER — LIDOCAINE HYDROCHLORIDE 20 MG/ML
INJECTION, SOLUTION EPIDURAL; INFILTRATION; INTRACAUDAL; PERINEURAL
Status: DISCONTINUED | OUTPATIENT
Start: 2025-04-24 | End: 2025-04-24 | Stop reason: SDUPTHER

## 2025-04-24 RX ORDER — ONDANSETRON 2 MG/ML
4 INJECTION INTRAMUSCULAR; INTRAVENOUS
Status: DISCONTINUED | OUTPATIENT
Start: 2025-04-24 | End: 2025-04-24 | Stop reason: HOSPADM

## 2025-04-24 RX ORDER — GLUCAGON 1 MG
1 KIT INJECTION PRN
Status: DISCONTINUED | OUTPATIENT
Start: 2025-04-24 | End: 2025-04-24 | Stop reason: HOSPADM

## 2025-04-24 RX ORDER — PROPOFOL 10 MG/ML
INJECTION, EMULSION INTRAVENOUS
Status: DISCONTINUED | OUTPATIENT
Start: 2025-04-24 | End: 2025-04-24 | Stop reason: SDUPTHER

## 2025-04-24 RX ORDER — PROMETHAZINE HYDROCHLORIDE 12.5 MG/1
12.5 TABLET ORAL ONCE
Status: COMPLETED | OUTPATIENT
Start: 2025-04-24 | End: 2025-04-24

## 2025-04-24 RX ADMIN — WATER 2000 MG: 1 INJECTION INTRAMUSCULAR; INTRAVENOUS; SUBCUTANEOUS at 08:02

## 2025-04-24 RX ADMIN — PROPOFOL 150 MG: 10 INJECTION, EMULSION INTRAVENOUS at 07:59

## 2025-04-24 RX ADMIN — FENTANYL CITRATE 50 MCG: 50 INJECTION INTRAMUSCULAR; INTRAVENOUS at 07:59

## 2025-04-24 RX ADMIN — ONDANSETRON 4 MG: 2 INJECTION INTRAMUSCULAR; INTRAVENOUS at 07:55

## 2025-04-24 RX ADMIN — MIDAZOLAM 2 MG: 1 INJECTION, SOLUTION INTRAMUSCULAR; INTRAVENOUS at 07:55

## 2025-04-24 RX ADMIN — INSULIN LISPRO 3 UNITS: 100 INJECTION, SOLUTION INTRAVENOUS; SUBCUTANEOUS at 06:59

## 2025-04-24 RX ADMIN — LIDOCAINE HYDROCHLORIDE 100 MG: 20 INJECTION, SOLUTION EPIDURAL; INFILTRATION; INTRACAUDAL; PERINEURAL at 07:59

## 2025-04-24 RX ADMIN — SODIUM CHLORIDE, SODIUM LACTATE, POTASSIUM CHLORIDE, AND CALCIUM CHLORIDE: .6; .31; .03; .02 INJECTION, SOLUTION INTRAVENOUS at 06:55

## 2025-04-24 RX ADMIN — PROMETHAZINE HYDROCHLORIDE 12.5 MG: 12.5 TABLET ORAL at 06:15

## 2025-04-24 RX ADMIN — DEXAMETHASONE SODIUM PHOSPHATE 4 MG: 4 INJECTION INTRA-ARTICULAR; INTRALESIONAL; INTRAMUSCULAR; INTRAVENOUS; SOFT TISSUE at 08:03

## 2025-04-24 ASSESSMENT — PAIN - FUNCTIONAL ASSESSMENT
PAIN_FUNCTIONAL_ASSESSMENT: ACTIVITIES ARE NOT PREVENTED
PAIN_FUNCTIONAL_ASSESSMENT: ACTIVITIES ARE NOT PREVENTED
PAIN_FUNCTIONAL_ASSESSMENT: 0-10
PAIN_FUNCTIONAL_ASSESSMENT: ACTIVITIES ARE NOT PREVENTED

## 2025-04-24 ASSESSMENT — PAIN SCALES - GENERAL
PAINLEVEL_OUTOF10: 0

## 2025-04-24 ASSESSMENT — PAIN DESCRIPTION - PAIN TYPE
TYPE: SURGICAL PAIN;OTHER (COMMENT)
TYPE: SURGICAL PAIN;OTHER (COMMENT)
TYPE: SURGICAL PAIN

## 2025-04-24 ASSESSMENT — PAIN DESCRIPTION - LOCATION
LOCATION: BREAST

## 2025-04-24 ASSESSMENT — PAIN DESCRIPTION - ORIENTATION
ORIENTATION: RIGHT

## 2025-04-24 NOTE — OP NOTE
Operative Note      Patient: Lilly Jeffrey  YOB: 1965  MRN: [id number]    Date of Procedure: 4/24/2025    Pre-Op Diagnosis Codes:      * Atypical ductal hyperplasia of right breast [N60.91]    Post-Op Diagnosis: Same       Procedure(s):  RIGHT BREAST LOCALIZED EXCISIONAL BIOPSY    Surgeon(s):  Ember Myers DO    Assistant:   Surgical Assistant: Lisette Ram    Anesthesia: General    Estimated Blood Loss (mL): Minimal    Complications: None    Specimens:   ID Type Source Tests Collected by Time Destination   A : Right Breast Mass Tissue Breast SURGICAL PATHOLOGY Ember Myers DO 4/24/2025 0815        Implants:  * No implants in log *      Drains: * No LDAs found *    Findings:  Infection Present At Time Of Surgery (PATOS) (choose all levels that have infection present):  No infection present  Other Findings: see dictation     Detailed Description of Procedure:     After informed consent was obtained the patient was taken to the operating room and placed in the supine position.  General anesthesia was administered by the anesthetist to titrate to effect.  The right breast was prepped and draped in the usual sterile fashion and a time out procedure was performed. Next the localizer was used to identify the CARISSA in the breast. Local anesthesia was used to anesthetize the skin. Using a 15 blade scalpel an incision was made along the areola.  Using the localizer probe to guide dissection bovie was used to dissect through the breast tissue  to the shortest distance identified by the probe. An tasneem clamped was then used to grasp the breast tissue and probe confirmed the CARISSA within the specimen. Circumferential dissection was performed. As the tissue was grasped with the Allis the carissa popped from the tract of the specimen. The tract was visible. The specimen was then marked single short superior, single long lateral, double long anterior and sent of the field. CARISSA was placed on top  of this. Faxitron was used to confirm the SANGITA was removed. The clip was not in the specimen. Additional margins were not taken as I visualized the area of excision with the tract. Clips were placed in the bed of the wound. The wound was irrigated and suctioned dry and found to be hemostatic. The deep breast tissue was closed with 2-0 vicryl suture. The deep dermis was then re -approximated with 3-0 vicryl in a simple interrupted fashion and skin was closed with 4-0 monocryl in a subcuticular fashion. Sterile dressing was applied. The patient was subsequently extubated, tolerated the procedure well and sent to recovery in stable condition.     Electronically signed by Ember Myers DO on 4/24/2025 at 8:36 AM

## 2025-04-24 NOTE — ANESTHESIA POSTPROCEDURE EVALUATION
Department of Anesthesiology  Postprocedure Note    Patient: Lilly Jeffrey  MRN: [id number]  YOB: 1965  Date of evaluation: 4/24/2025    Procedure Summary       Date: 04/24/25 Room / Location: [facility] MAIN 01 / [facility] MAIN OR    Anesthesia Start: 0755 Anesthesia Stop: 0830    Procedure: RIGHT BREAST LOCALIZED EXCISIONAL BIOPSY (Right: Breast) Diagnosis:       Atypical ductal hyperplasia of right breast      (Atypical ductal hyperplasia of right breast [N60.91])    Surgeons: Ember Myers DO Responsible Provider: Eder Acosta MD    Anesthesia Type: General ASA Status: 3            Anesthesia Type: General    Lizbeth Phase I: Lizbeth Score: 10    Lizbeth Phase II: Lizbeth Score: 10    Anesthesia Post Evaluation    Patient location during evaluation: PACU  Patient participation: complete - patient participated  Level of consciousness: awake  Airway patency: patent  Nausea & Vomiting: no nausea  Cardiovascular status: blood pressure returned to baseline  Respiratory status: acceptable  Hydration status: euvolemic  Pain management: adequate    No notable events documented.

## 2025-04-24 NOTE — ANESTHESIA PRE PROCEDURE
Department of Anesthesiology  Preprocedure Note       Name:  Lilly Jeffrey   Age:  59 y.o.  :  1965                                          MRN:  [id number]         Date:  2025      Surgeon: Surgeon(s):  Ember Myers DO    Procedure: Procedure(s):  RIGHT BREAST LOCALIZED EXCISIONAL BIOPSY    Medications prior to admission:   Prior to Admission medications    Medication Sig Start Date End Date Taking? Authorizing Provider   HYDROcodone-acetaminophen (NORCO) 5-325 MG per tablet Take 1 tablet by mouth every 4 hours as needed for Pain for up to 3 days. Intended supply: 3 days. Take lowest dose possible to manage pain Max Daily Amount: 6 tablets 25 Yes Ember Myers DO   atorvastatin (LIPITOR) 40 MG tablet Take 1 tablet by mouth daily 25  Yes Isael Burgos APRN - NP   estradiol (ESTRACE) 0.5 MG tablet Take 1 tablet by mouth daily 25  Yes Isael Burgos APRN - NP   losartan-hydroCHLOROthiazide (HYZAAR) 100-25 MG per tablet Take 1 tablet by mouth daily 25  Yes Isael Burgos APRN - NP   Tirzepatide 12.5 MG/0.5ML SOAJ Inject 12.5 mg into the skin every 7 days 25   Isael Burgos APRN - NP   Continuous Glucose  (FREESTYLE ELISE 2 READER) ALYCIA Use to check blood sugar 4-6 times a day 25   Isael Burgos APRN - NP   Continuous Glucose Sensor (FREESTYLE ELISE 2 SENSOR) MISC Check blood sugars 2-3 times per week. 1 time fasting (before meals), and 1 time post prandial (1-2 hours after meals). Check blood sugars also when feeling ill. 25   Isael Burgos APRN - NP   ondansetron (ZOFRAN-ODT) 8 MG TBDP disintegrating tablet Take 1 tablet by mouth every 8 hours as needed for Nausea or Vomiting  Patient not taking: Reported on 2025   Isael Burgos APRN - NP   gabapentin (NEURONTIN) 300 MG capsule Take 1 capsule by mouth daily as needed (Neck nerve pain) for up to 90 days. Max Daily Amount: 300 mg 24

## 2025-04-24 NOTE — DISCHARGE INSTRUCTIONS
Post Operative Discharge Instructions    No driving for 24 hours after surgery and off of prescription pain medication.    Avoid activities that bump or cause jarring movements at the surgical site for 10 days.    No lifting more than 10-15 pounds for 2 weeks after surgery or until cleared for activity at your follow up.    Walking is encouraged after surgery.    Stairs are ok to climb.      DIET:    Diet as tolerated. Start with liquids then advance your diet based on how you feel.    No alcoholic beverages for 24 hours after surgery or while on antibiotics or pain mdications.    Drink plenty of water.        MEDICATIONS:    Use daily stool softners (over the counter such as Colace or Senekot) while on pain medications.     Resume pre-operative medications. If you are on any blood thinners see special instructions below.    Use prescriptions given or Tylenol, Ibuprofen as needed for pain.    Do not use more than 4000mg of Tylenol (acetaminophen) per day. Be aware this may be  in your prescription medication as well.    Be aware narcotic prescriptions are tightly controlled in the [facility]. If requiring more than one refill, a follow up appointment will be required.      WOUND CARE:      You have steri strips on your incisions, you may shower in 24 hours and pat dry. Leave steri strips on until they fall off on their own. Wear your surgical bra or own supportive bra at all times until cleared at your follow up    Do not tub bathe, swim, or soak incisions until cleared to do so at your follow up.    Ice bag to the affected area; 20 minutes on and 20 minutes off if desired.      FOLLOW UP CARE:    You should have an appointment scheduled within 14 days after surgery. If this is not yet scheduled, call the office.  Any forms that you need filled out regarding your medical care can be brought to the office at follow up appointment of faxed to: [phone]        CALL DOCTOR IF:  Temperature is over 101 degrees, a

## 2025-04-24 NOTE — H&P
Chief Complaint   Patient presents with    New Patient       Right breast          Assessment:      ICD-10-CM     1. Atypical ductal hyperplasia of right breast  N60.91 SCHEDULE SURGERY       External Referral To Oncology       2. Intraductal papilloma of breast, right  D24.1         3. Discharge from right nipple  N64.52         4. Family history of breast cancer in mother  Z80.3         5. Abnormal mammogram of left breast  R92.8 TRUDI RIVKA DIGITAL DIAGNOSTIC UNILATERAL LEFT       US BREAST LIMITED LEFT             Plan: We discussed the results of atypical ductal hyperplasia (ADH) and papilloma in detail. We discussed that generally ADH is not considered cancer or pre-cancer, but rather a high risk marker. A woman with ADH may have a higher than average risk of developing breast cancer. We discussed that treatment can range from observation alone to medication.  Removal of the breasts (mastectomy) is not needed or recommended for ADH alone. When diagnosed on a core biopsy, excision is generally recommended to ensure removal of abnormal cells as well as to avoid undersampling of ADH associated with cancer. She agreed to left breast localized excisional biopsy. The risks and benefits of the procedure were reviewed with the patient including infection, bleeding, need for repeat procedure, injury to surrounding structures. Post operative expectations were reviewed. We discussed high risk screening going forward and referral to medical oncology which she is agreeable to. I have recommended she stop estrace which she is agreeable to and discuss with her GYN going forward alternatives as she is high risk for breast cancer. Repeat mammogram left side in 6 months is also recommended- this has been ordered, possible simple cyst noted on the left.           HPI:  Lilly Jeffrey is a 59 y.o. female who is here to discuss recent biopsy results. She did notice clear nipple discharge right nipple in November one time which

## 2025-05-05 ENCOUNTER — PATIENT MESSAGE (OUTPATIENT)
Facility: CLINIC | Age: 60
End: 2025-05-05

## 2025-05-05 DIAGNOSIS — N18.32 TYPE 2 DIABETES MELLITUS WITH STAGE 3B CHRONIC KIDNEY DISEASE, WITHOUT LONG-TERM CURRENT USE OF INSULIN (HCC): Primary | ICD-10-CM

## 2025-05-05 DIAGNOSIS — E11.22 TYPE 2 DIABETES MELLITUS WITH STAGE 3B CHRONIC KIDNEY DISEASE, WITHOUT LONG-TERM CURRENT USE OF INSULIN (HCC): Primary | ICD-10-CM

## 2025-05-07 ENCOUNTER — OFFICE VISIT (OUTPATIENT)
Age: 60
End: 2025-05-07

## 2025-05-07 VITALS
HEIGHT: 64 IN | BODY MASS INDEX: 36.02 KG/M2 | SYSTOLIC BLOOD PRESSURE: 136 MMHG | WEIGHT: 211 LBS | TEMPERATURE: 98 F | DIASTOLIC BLOOD PRESSURE: 86 MMHG | OXYGEN SATURATION: 100 % | HEART RATE: 96 BPM | RESPIRATION RATE: 18 BRPM

## 2025-05-07 DIAGNOSIS — N60.91 ATYPICAL DUCTAL HYPERPLASIA OF RIGHT BREAST: Primary | ICD-10-CM

## 2025-05-07 DIAGNOSIS — D24.1 INTRADUCTAL PAPILLOMA OF BREAST, RIGHT: ICD-10-CM

## 2025-05-07 PROCEDURE — 99024 POSTOP FOLLOW-UP VISIT: CPT

## 2025-05-07 ASSESSMENT — ENCOUNTER SYMPTOMS
SHORTNESS OF BREATH: 0
VOMITING: 0
COLOR CHANGE: 0
NAUSEA: 0

## 2025-05-07 NOTE — PROGRESS NOTES
CC:   Chief Complaint   Patient presents with    Follow-up     4/2024 right breast excisional biopsy        Assessment:    ICD-10-CM    1. Atypical ductal hyperplasia of right breast  N60.91       2. Intraductal papilloma of breast, right  D24.1           Plan: I have personally reviewed surgical pathology from 4/24/2025 demonstrating biopsy site changes with no evidence of residual papilloma or atypical hyperplasia.  She should continue to adhere to postoperative lifting restrictions until she is 6 weeks postoperative.  I have encouraged her to perform monthly self breast exams and notify our office of any changes to her breast including lumps, skin changes, nipple discharge, or new pain. I would like her to follow up with me or Dr. Myers in 3 months or sooner with questions or concerns.    HPI:  Lilly Jeffrey is a 59 y.o. female who is status post right breast localized excisional biopsy performed by Dr. Myers on 4/24/2025.  She states that her recovery has gone well.  She will occasionally experience some soreness over the right breast with movement and activity.  She denies fevers or chills.    Allergies:  Allergies   Allergen Reactions    Honey Bee Venom [Bee Venom] Nausea Only     Nausea and cotton mouth    Adhesive Tape Rash     Patient has a sensitivity to the adhesive used in the Holter Monitor.    Flavoring Agent (Non-Screening) Other (See Comments)     DRY MOUTH, N&V    Apple Juice Nausea Only     Pt gets  cotton mouth and nausea    Rosuvastatin Nausea Only       Medication Review:  Current Outpatient Medications on File Prior to Visit   Medication Sig Dispense Refill    atorvastatin (LIPITOR) 40 MG tablet Take 1 tablet by mouth daily 90 tablet 1    estradiol (ESTRACE) 0.5 MG tablet Take 1 tablet by mouth daily 90 tablet 1    Continuous Glucose  (FREESTYLE ELISE 2 READER) ALYCIA Use to check blood sugar 4-6 times a day 1 each 0    Continuous Glucose Sensor (FREESTYLE ELISE 2 SENSOR)

## 2025-05-27 NOTE — TELEPHONE ENCOUNTER
Last Visit: 1/17/25   Next Appointment: 7/17/25    Requested Prescriptions     Pending Prescriptions Disp Refills    MOUNJARO 12.5 MG/0.5ML SOAJ injection [Pharmacy Med Name: Mounjaro 12.5 MG/0.5ML Subcutaneous Solution Pen-injector] 4 mL 0     Sig: INJECT 12.5 MG INTO THE SKIN EVERY 7 DAYS

## 2025-05-28 RX ORDER — TIRZEPATIDE 12.5 MG/.5ML
INJECTION, SOLUTION SUBCUTANEOUS
Qty: 4 ML | Refills: 4 | Status: SHIPPED | OUTPATIENT
Start: 2025-05-28

## 2025-07-09 DIAGNOSIS — N18.32 TYPE 2 DIABETES MELLITUS WITH STAGE 3B CHRONIC KIDNEY DISEASE, WITHOUT LONG-TERM CURRENT USE OF INSULIN (HCC): ICD-10-CM

## 2025-07-09 DIAGNOSIS — I10 ESSENTIAL (PRIMARY) HYPERTENSION: Primary | ICD-10-CM

## 2025-07-09 DIAGNOSIS — E78.2 MIXED HYPERLIPIDEMIA: ICD-10-CM

## 2025-07-09 DIAGNOSIS — Z13.29 SCREENING FOR THYROID DISORDER: ICD-10-CM

## 2025-07-09 DIAGNOSIS — E11.22 TYPE 2 DIABETES MELLITUS WITH STAGE 3B CHRONIC KIDNEY DISEASE, WITHOUT LONG-TERM CURRENT USE OF INSULIN (HCC): ICD-10-CM

## 2025-07-11 ENCOUNTER — HOSPITAL ENCOUNTER (OUTPATIENT)
Facility: HOSPITAL | Age: 60
Setting detail: SPECIMEN
Discharge: HOME | End: 2025-07-14
Payer: COMMERCIAL

## 2025-07-11 DIAGNOSIS — I10 ESSENTIAL (PRIMARY) HYPERTENSION: ICD-10-CM

## 2025-07-11 DIAGNOSIS — N18.32 TYPE 2 DIABETES MELLITUS WITH STAGE 3B CHRONIC KIDNEY DISEASE, WITHOUT LONG-TERM CURRENT USE OF INSULIN (HCC): ICD-10-CM

## 2025-07-11 DIAGNOSIS — E11.22 TYPE 2 DIABETES MELLITUS WITH STAGE 3B CHRONIC KIDNEY DISEASE, WITHOUT LONG-TERM CURRENT USE OF INSULIN (HCC): ICD-10-CM

## 2025-07-11 DIAGNOSIS — E78.2 MIXED HYPERLIPIDEMIA: ICD-10-CM

## 2025-07-11 DIAGNOSIS — Z13.29 SCREENING FOR THYROID DISORDER: ICD-10-CM

## 2025-07-11 LAB
ALBUMIN SERPL-MCNC: 3.6 G/DL (ref 3.4–5)
ALBUMIN/GLOB SERPL: 1.2 (ref 0.8–1.7)
ALP SERPL-CCNC: 54 U/L (ref 45–117)
ALT SERPL-CCNC: 25 U/L (ref 10–35)
ANION GAP SERPL CALC-SCNC: 10 MMOL/L (ref 3–18)
AST SERPL-CCNC: 23 U/L (ref 10–38)
BILIRUB SERPL-MCNC: 0.4 MG/DL (ref 0.2–1)
BUN SERPL-MCNC: 13 MG/DL (ref 6–23)
BUN/CREAT SERPL: 11 (ref 12–20)
CALCIUM SERPL-MCNC: 9.7 MG/DL (ref 8.5–10.1)
CHLORIDE SERPL-SCNC: 103 MMOL/L (ref 98–107)
CHOLEST SERPL-MCNC: 156 MG/DL
CO2 SERPL-SCNC: 28 MMOL/L (ref 21–32)
CREAT SERPL-MCNC: 1.13 MG/DL (ref 0.6–1.3)
CREAT UR-MCNC: 302 MG/DL (ref 30–125)
EST. AVERAGE GLUCOSE BLD GHB EST-MCNC: 136 MG/DL
GLOBULIN SER CALC-MCNC: 3 G/DL (ref 2–4)
GLUCOSE SERPL-MCNC: 106 MG/DL (ref 74–108)
HBA1C MFR BLD: 6.4 % (ref 4.2–5.6)
HDLC SERPL-MCNC: 52 MG/DL (ref 40–60)
HDLC SERPL: 3 (ref 0–5)
LDLC SERPL CALC-MCNC: 85 MG/DL (ref 0–100)
MICROALBUMIN UR-MCNC: <1.2 MG/DL (ref 0–3)
MICROALBUMIN/CREAT UR-RTO: ABNORMAL MG/G (ref 0–30)
POTASSIUM SERPL-SCNC: 4.4 MMOL/L (ref 3.5–5.5)
PROT SERPL-MCNC: 6.6 G/DL (ref 6.4–8.2)
SODIUM SERPL-SCNC: 141 MMOL/L (ref 136–145)
TRIGL SERPL-MCNC: 95 MG/DL (ref 0–150)
TSH, 3RD GENERATION: 1.54 UIU/ML (ref 0.27–4.2)
VLDLC SERPL CALC-MCNC: 19 MG/DL

## 2025-07-11 PROCEDURE — 83036 HEMOGLOBIN GLYCOSYLATED A1C: CPT

## 2025-07-11 PROCEDURE — 84443 ASSAY THYROID STIM HORMONE: CPT

## 2025-07-11 PROCEDURE — 36415 COLL VENOUS BLD VENIPUNCTURE: CPT

## 2025-07-11 PROCEDURE — 80061 LIPID PANEL: CPT

## 2025-07-11 PROCEDURE — 82570 ASSAY OF URINE CREATININE: CPT

## 2025-07-11 PROCEDURE — 80053 COMPREHEN METABOLIC PANEL: CPT

## 2025-07-11 PROCEDURE — 82043 UR ALBUMIN QUANTITATIVE: CPT

## 2025-07-16 SDOH — ECONOMIC STABILITY: INCOME INSECURITY: IN THE LAST 12 MONTHS, WAS THERE A TIME WHEN YOU WERE NOT ABLE TO PAY THE MORTGAGE OR RENT ON TIME?: NO

## 2025-07-16 SDOH — ECONOMIC STABILITY: FOOD INSECURITY: WITHIN THE PAST 12 MONTHS, THE FOOD YOU BOUGHT JUST DIDN'T LAST AND YOU DIDN'T HAVE MONEY TO GET MORE.: NEVER TRUE

## 2025-07-16 SDOH — ECONOMIC STABILITY: FOOD INSECURITY: WITHIN THE PAST 12 MONTHS, YOU WORRIED THAT YOUR FOOD WOULD RUN OUT BEFORE YOU GOT MONEY TO BUY MORE.: NEVER TRUE

## 2025-07-16 SDOH — ECONOMIC STABILITY: TRANSPORTATION INSECURITY
IN THE PAST 12 MONTHS, HAS THE LACK OF TRANSPORTATION KEPT YOU FROM MEDICAL APPOINTMENTS OR FROM GETTING MEDICATIONS?: NO

## 2025-07-16 SDOH — ECONOMIC STABILITY: TRANSPORTATION INSECURITY
IN THE PAST 12 MONTHS, HAS LACK OF TRANSPORTATION KEPT YOU FROM MEETINGS, WORK, OR FROM GETTING THINGS NEEDED FOR DAILY LIVING?: NO

## 2025-07-17 ENCOUNTER — OFFICE VISIT (OUTPATIENT)
Facility: CLINIC | Age: 60
End: 2025-07-17
Payer: COMMERCIAL

## 2025-07-17 VITALS
HEART RATE: 91 BPM | DIASTOLIC BLOOD PRESSURE: 77 MMHG | SYSTOLIC BLOOD PRESSURE: 136 MMHG | WEIGHT: 215 LBS | RESPIRATION RATE: 18 BRPM | TEMPERATURE: 97.7 F | BODY MASS INDEX: 36.7 KG/M2 | OXYGEN SATURATION: 96 % | HEIGHT: 64 IN

## 2025-07-17 DIAGNOSIS — R01.1 CARDIAC MURMUR: ICD-10-CM

## 2025-07-17 DIAGNOSIS — Z13.0 SCREENING FOR DEFICIENCY ANEMIA: ICD-10-CM

## 2025-07-17 DIAGNOSIS — E78.2 MIXED HYPERLIPIDEMIA: ICD-10-CM

## 2025-07-17 DIAGNOSIS — E11.8 TYPE 2 DIABETES MELLITUS WITH UNSPECIFIED COMPLICATIONS (HCC): ICD-10-CM

## 2025-07-17 DIAGNOSIS — N95.1 VASOMOTOR SYMPTOMS DUE TO MENOPAUSE: ICD-10-CM

## 2025-07-17 DIAGNOSIS — Z13.29 SCREENING FOR THYROID DISORDER: ICD-10-CM

## 2025-07-17 DIAGNOSIS — Z13.21 ENCOUNTER FOR VITAMIN DEFICIENCY SCREENING: ICD-10-CM

## 2025-07-17 DIAGNOSIS — R09.89 LEFT CAROTID BRUIT: ICD-10-CM

## 2025-07-17 DIAGNOSIS — I10 ESSENTIAL (PRIMARY) HYPERTENSION: Primary | ICD-10-CM

## 2025-07-17 PROCEDURE — 99214 OFFICE O/P EST MOD 30 MIN: CPT | Performed by: NURSE PRACTITIONER

## 2025-07-17 PROCEDURE — 3078F DIAST BP <80 MM HG: CPT | Performed by: NURSE PRACTITIONER

## 2025-07-17 PROCEDURE — 3044F HG A1C LEVEL LT 7.0%: CPT | Performed by: NURSE PRACTITIONER

## 2025-07-17 PROCEDURE — 3075F SYST BP GE 130 - 139MM HG: CPT | Performed by: NURSE PRACTITIONER

## 2025-07-17 RX ORDER — ATORVASTATIN CALCIUM 40 MG/1
40 TABLET, FILM COATED ORAL DAILY
Qty: 90 TABLET | Refills: 1 | Status: SHIPPED | OUTPATIENT
Start: 2025-07-17

## 2025-07-17 RX ORDER — HYDROCHLOROTHIAZIDE 12.5 MG/1
CAPSULE ORAL
Qty: 2 EACH | Refills: 5 | Status: SHIPPED | OUTPATIENT
Start: 2025-07-17

## 2025-07-17 RX ORDER — ESTRADIOL 0.5 MG/1
0.5 TABLET ORAL DAILY
Qty: 90 TABLET | Refills: 1 | Status: SHIPPED | OUTPATIENT
Start: 2025-07-17

## 2025-07-17 RX ORDER — KETOROLAC TROMETHAMINE 30 MG/ML
INJECTION, SOLUTION INTRAMUSCULAR; INTRAVENOUS
Qty: 1 EACH | Refills: 0 | Status: SHIPPED | OUTPATIENT
Start: 2025-07-17 | End: 2025-07-17 | Stop reason: ALTCHOICE

## 2025-07-17 RX ORDER — KETOROLAC TROMETHAMINE 30 MG/ML
INJECTION, SOLUTION INTRAMUSCULAR; INTRAVENOUS
Qty: 1 EACH | Refills: 0 | Status: SHIPPED | OUTPATIENT
Start: 2025-07-17

## 2025-07-17 RX ORDER — LOSARTAN POTASSIUM AND HYDROCHLOROTHIAZIDE 25; 100 MG/1; MG/1
1 TABLET ORAL DAILY
Qty: 90 TABLET | Refills: 1 | Status: SHIPPED | OUTPATIENT
Start: 2025-07-17

## 2025-07-17 NOTE — PROGRESS NOTES
Office Note    Assessment & Plan:   diabetes needs further observation, needs improvement, and needs to follow diet more regularly, hypertension needs further observation and needs to follow diet more regularly, hyperlipidemia needs further observation and needs to follow diet more regularly.    Diabetic issues reviewed with her: diabetic diet discussed in detail, written exchange diet given, low cholesterol diet, weight control and daily exercise discussed, home glucose monitoring emphasized, all medications, side effects and compliance discussed carefully, diabetic Sick Day rules reviewed, handout given, foot care discussed and Podiatry visits discussed, annual eye examinations at Ophthalmology discussed, glycohemoglobin and other lab monitoring discussed, long term diabetic complications discussed, and labs immediately prior to next visit.   Hypertension issues reviewed with her: Continue current therapy    1. Essential (primary) hypertension  -     losartan-hydroCHLOROthiazide (HYZAAR) 100-25 MG per tablet; Take 1 tablet by mouth daily, Disp-90 tablet, R-1Normal  -     Comprehensive Metabolic Panel; Future  -     Albumin/Creatinine Ratio, Urine; Future  2. Type 2 diabetes mellitus with unspecified complications (HCC)  -     Tirzepatide (MOUNJARO) 15 MG/0.5ML SOAJ pen; Inject 15 mg into the skin once a week, Disp-2 mL, R-3Normal  -     Continuous Glucose Sensor (FREESTYLE ELISE 3 PLUS SENSOR) MISC; CHECK BLOOD SUGAR 3 TIMES PER DAY. 1 TIME (S) \"FASTING\" BEFORE BREAKFAST, AND 1 TIME 2 HOURS AFTER 2 MEALS, AND AS NEEDED WHEN ILL OR NOT FEELING WELL., Disp-2 each, R-5Normal  -     Continuous Glucose  (FREESTYLE ELISE 3 READER) ALYCIA; CHECK BLOOD SUGAR 3 TIMES PER DAY. 1 TIME (S) \"FASTING\" BEFORE BREAKFAST, AND 1 TIME 2 HOURS AFTER 2 MEALS, AND AS NEEDED WHEN ILL OR NOT FEELING WELL., Disp-1 each, R-0Normal  -     Hemoglobin A1C; Future  3. Cardiac murmur  4. Mixed hyperlipidemia  -     atorvastatin (LIPITOR)

## 2025-07-17 NOTE — PROGRESS NOTES
Have you been to the ER, urgent care clinic since your last visit?  Hospitalized since your last visit?   NO    Have you seen or consulted any other health care providers outside our system since your last visit?   NO      “Have you had a diabetic eye exam?”    NO     Date of last diabetic eye exam: 11/11/2022            Pt states doesn't feel like herself when she takes Ozempic and would like to go back to [employer]. Also stating she would like the newer version of [facility]

## 2025-07-25 ENCOUNTER — TELEPHONE (OUTPATIENT)
Age: 60
End: 2025-07-25

## 2025-07-31 ENCOUNTER — PATIENT MESSAGE (OUTPATIENT)
Facility: CLINIC | Age: 60
End: 2025-07-31

## 2025-07-31 DIAGNOSIS — E11.8 TYPE 2 DIABETES MELLITUS WITH UNSPECIFIED COMPLICATIONS (HCC): ICD-10-CM

## 2025-08-01 RX ORDER — KETOROLAC TROMETHAMINE 30 MG/ML
INJECTION, SOLUTION INTRAMUSCULAR; INTRAVENOUS
Qty: 1 EACH | Refills: 0 | Status: SHIPPED | OUTPATIENT
Start: 2025-08-01

## 2025-08-08 ENCOUNTER — OFFICE VISIT (OUTPATIENT)
Age: 60
End: 2025-08-08
Payer: COMMERCIAL

## 2025-08-08 VITALS
TEMPERATURE: 96.9 F | BODY MASS INDEX: 36.5 KG/M2 | OXYGEN SATURATION: 96 % | HEIGHT: 64 IN | DIASTOLIC BLOOD PRESSURE: 70 MMHG | SYSTOLIC BLOOD PRESSURE: 126 MMHG | HEART RATE: 93 BPM | WEIGHT: 213.8 LBS | RESPIRATION RATE: 20 BRPM

## 2025-08-08 DIAGNOSIS — D24.1 INTRADUCTAL PAPILLOMA OF BREAST, RIGHT: ICD-10-CM

## 2025-08-08 DIAGNOSIS — N60.91 ATYPICAL DUCTAL HYPERPLASIA OF RIGHT BREAST: Primary | ICD-10-CM

## 2025-08-08 DIAGNOSIS — Z80.3 FAMILY HISTORY OF BREAST CANCER IN MOTHER: ICD-10-CM

## 2025-08-08 PROCEDURE — 3074F SYST BP LT 130 MM HG: CPT

## 2025-08-08 PROCEDURE — 3078F DIAST BP <80 MM HG: CPT

## 2025-08-08 PROCEDURE — 99213 OFFICE O/P EST LOW 20 MIN: CPT

## 2025-08-08 ASSESSMENT — ENCOUNTER SYMPTOMS
VOMITING: 0
NAUSEA: 0
SHORTNESS OF BREATH: 0
COLOR CHANGE: 0
ABDOMINAL PAIN: 0

## (undated) DEVICE — MAJOR PLASTIC-LF: Brand: MEDLINE INDUSTRIES, INC.

## (undated) DEVICE — COVER,LIGHT HANDLE,FLX,1/PK: Brand: MEDLINE INDUSTRIES, INC.

## (undated) DEVICE — SUTURE MONOCRYL SZ 4-0 L27IN ABSRB UD L24MM PS-1 3/8 CIR PRIM Y935H

## (undated) DEVICE — APPLIER CLP L9.38IN M LIG TI DISP STR RNG HNDL LIGACLP

## (undated) DEVICE — SHEARS ENDOSCP L9CM CRV HARM FOCS +

## (undated) DEVICE — 48" PROBE COVER W/GEL, ULTRASOUND, STERILE: Brand: SITE-RITE

## (undated) DEVICE — BLADE ES ELASTOMERIC COAT INSUL DURABLE BEND UPTO 90DEG

## (undated) DEVICE — SUTURE VICRYL + SZ 3-0 L27IN ABSRB UD L26MM SH 1/2 CIR VCP416H

## (undated) DEVICE — SUTURE PERMA-HAND SZ 2-0 L30IN NONABSORBABLE BLK L26MM SH K833H

## (undated) DEVICE — APPLICATOR MEDICATED 26 CC SOLUTION HI LT ORNG CHLORAPREP

## (undated) DEVICE — GLOVE SURG SZ 6 CRM LTX FREE POLYISOPRENE POLYMER BEAD ANTI

## (undated) DEVICE — MASTISOL ADHESIVE LIQ 2/3ML

## (undated) DEVICE — STRIP SKIN CLSR W1XL5IN NYLON REINF CURAD STERIL

## (undated) DEVICE — STRAP,POSITIONING,KNEE/BODY,FOAM,4X60": Brand: MEDLINE

## (undated) DEVICE — CLEAN UP KIT: Brand: MEDLINE INDUSTRIES, INC.

## (undated) DEVICE — NEEDLE HYPO 25GA L1.5IN BVL ORIENTED ECLIPSE

## (undated) DEVICE — GOWN,AURORA,FABRIC-REINFORCED,LARGE: Brand: MEDLINE

## (undated) DEVICE — ELECTRODE PT RET AD L9FT HI MOIST COND ADH HYDRGEL CORDED

## (undated) DEVICE — SHEATH WAND COVER MOLLI

## (undated) DEVICE — GAMMEX® NON-LATEX PI UNDERGLOVE SIZE 6.5, STERILE POLYISOPRENE POWDER-FREE SURGICAL GLOVE: Brand: GAMMEX

## (undated) DEVICE — NEPTUNE E-SEP SMOKE EVACUATION PENCIL, COATED, 70MM BLADE, PUSH BUTTON SWITCH: Brand: NEPTUNE E-SEP

## (undated) DEVICE — BLADE,STAINLESS-STEEL,15,STRL,DISPOSABLE: Brand: MEDLINE

## (undated) DEVICE — PROBE GAMMA TRUNODE